# Patient Record
Sex: MALE | Race: WHITE | URBAN - METROPOLITAN AREA
[De-identification: names, ages, dates, MRNs, and addresses within clinical notes are randomized per-mention and may not be internally consistent; named-entity substitution may affect disease eponyms.]

---

## 2018-06-28 ENCOUNTER — OUTPATIENT (OUTPATIENT)
Dept: OUTPATIENT SERVICES | Facility: HOSPITAL | Age: 68
LOS: 1 days | Discharge: HOME | End: 2018-06-28

## 2018-06-28 DIAGNOSIS — M79.609 PAIN IN UNSPECIFIED LIMB: ICD-10-CM

## 2018-06-28 DIAGNOSIS — M79.672 PAIN IN LEFT FOOT: ICD-10-CM

## 2019-02-22 ENCOUNTER — INPATIENT (INPATIENT)
Facility: HOSPITAL | Age: 69
LOS: 2 days | Discharge: HOME | End: 2019-02-25
Attending: SURGERY | Admitting: SURGERY
Payer: MEDICARE

## 2019-02-22 VITALS
OXYGEN SATURATION: 96 % | HEIGHT: 67 IN | SYSTOLIC BLOOD PRESSURE: 171 MMHG | DIASTOLIC BLOOD PRESSURE: 102 MMHG | HEART RATE: 99 BPM | TEMPERATURE: 98 F | WEIGHT: 184.97 LBS | RESPIRATION RATE: 19 BRPM

## 2019-02-22 DIAGNOSIS — Z96.659 PRESENCE OF UNSPECIFIED ARTIFICIAL KNEE JOINT: Chronic | ICD-10-CM

## 2019-02-22 DIAGNOSIS — S69.92XA UNSPECIFIED INJURY OF LEFT WRIST, HAND AND FINGER(S), INITIAL ENCOUNTER: Chronic | ICD-10-CM

## 2019-02-22 LAB
ALBUMIN SERPL ELPH-MCNC: 4.6 G/DL — SIGNIFICANT CHANGE UP (ref 3.5–5.2)
ALP SERPL-CCNC: 77 U/L — SIGNIFICANT CHANGE UP (ref 30–115)
ALT FLD-CCNC: 52 U/L — HIGH (ref 0–41)
ANION GAP SERPL CALC-SCNC: 12 MMOL/L — SIGNIFICANT CHANGE UP (ref 7–14)
APTT BLD: 41.4 SEC — HIGH (ref 27–39.2)
AST SERPL-CCNC: 34 U/L — SIGNIFICANT CHANGE UP (ref 0–41)
BASOPHILS # BLD AUTO: 0.11 K/UL — SIGNIFICANT CHANGE UP (ref 0–0.2)
BASOPHILS NFR BLD AUTO: 1.6 % — HIGH (ref 0–1)
BILIRUB SERPL-MCNC: 0.6 MG/DL — SIGNIFICANT CHANGE UP (ref 0.2–1.2)
BUN SERPL-MCNC: 14 MG/DL — SIGNIFICANT CHANGE UP (ref 10–20)
CALCIUM SERPL-MCNC: 9.5 MG/DL — SIGNIFICANT CHANGE UP (ref 8.5–10.1)
CHLORIDE SERPL-SCNC: 99 MMOL/L — SIGNIFICANT CHANGE UP (ref 98–110)
CO2 SERPL-SCNC: 27 MMOL/L — SIGNIFICANT CHANGE UP (ref 17–32)
CREAT SERPL-MCNC: 1.2 MG/DL — SIGNIFICANT CHANGE UP (ref 0.7–1.5)
EOSINOPHIL # BLD AUTO: 0.17 K/UL — SIGNIFICANT CHANGE UP (ref 0–0.7)
EOSINOPHIL NFR BLD AUTO: 2.4 % — SIGNIFICANT CHANGE UP (ref 0–8)
GLUCOSE SERPL-MCNC: 173 MG/DL — HIGH (ref 70–99)
HCT VFR BLD CALC: 52.5 % — HIGH (ref 42–52)
HGB BLD-MCNC: 18 G/DL — SIGNIFICANT CHANGE UP (ref 14–18)
IMM GRANULOCYTES NFR BLD AUTO: 0.3 % — SIGNIFICANT CHANGE UP (ref 0.1–0.3)
INR BLD: 1.16 RATIO — SIGNIFICANT CHANGE UP (ref 0.65–1.3)
LYMPHOCYTES # BLD AUTO: 2.74 K/UL — SIGNIFICANT CHANGE UP (ref 1.2–3.4)
LYMPHOCYTES # BLD AUTO: 38.8 % — SIGNIFICANT CHANGE UP (ref 20.5–51.1)
MCHC RBC-ENTMCNC: 28.8 PG — SIGNIFICANT CHANGE UP (ref 27–31)
MCHC RBC-ENTMCNC: 34.3 G/DL — SIGNIFICANT CHANGE UP (ref 32–37)
MCV RBC AUTO: 84 FL — SIGNIFICANT CHANGE UP (ref 80–94)
MONOCYTES # BLD AUTO: 0.75 K/UL — HIGH (ref 0.1–0.6)
MONOCYTES NFR BLD AUTO: 10.6 % — HIGH (ref 1.7–9.3)
NEUTROPHILS # BLD AUTO: 3.28 K/UL — SIGNIFICANT CHANGE UP (ref 1.4–6.5)
NEUTROPHILS NFR BLD AUTO: 46.3 % — SIGNIFICANT CHANGE UP (ref 42.2–75.2)
NRBC # BLD: 0 /100 WBCS — SIGNIFICANT CHANGE UP (ref 0–0)
PLATELET # BLD AUTO: 204 K/UL — SIGNIFICANT CHANGE UP (ref 130–400)
POTASSIUM SERPL-MCNC: 4.3 MMOL/L — SIGNIFICANT CHANGE UP (ref 3.5–5)
POTASSIUM SERPL-SCNC: 4.3 MMOL/L — SIGNIFICANT CHANGE UP (ref 3.5–5)
PROT SERPL-MCNC: 7 G/DL — SIGNIFICANT CHANGE UP (ref 6–8)
PROTHROM AB SERPL-ACNC: 13.3 SEC — HIGH (ref 9.95–12.87)
RBC # BLD: 6.25 M/UL — HIGH (ref 4.7–6.1)
RBC # FLD: 14 % — SIGNIFICANT CHANGE UP (ref 11.5–14.5)
SODIUM SERPL-SCNC: 138 MMOL/L — SIGNIFICANT CHANGE UP (ref 135–146)
WBC # BLD: 7.07 K/UL — SIGNIFICANT CHANGE UP (ref 4.8–10.8)
WBC # FLD AUTO: 7.07 K/UL — SIGNIFICANT CHANGE UP (ref 4.8–10.8)

## 2019-02-22 PROCEDURE — 99223 1ST HOSP IP/OBS HIGH 75: CPT | Mod: 57

## 2019-02-22 RX ORDER — CEFAZOLIN SODIUM 1 G
1000 VIAL (EA) INJECTION EVERY 8 HOURS
Qty: 0 | Refills: 0 | Status: DISCONTINUED | OUTPATIENT
Start: 2019-02-23 | End: 2019-02-23

## 2019-02-22 RX ORDER — HYDROMORPHONE HYDROCHLORIDE 2 MG/ML
1 INJECTION INTRAMUSCULAR; INTRAVENOUS; SUBCUTANEOUS ONCE
Qty: 0 | Refills: 0 | Status: DISCONTINUED | OUTPATIENT
Start: 2019-02-22 | End: 2019-02-22

## 2019-02-22 RX ORDER — SODIUM CHLORIDE 9 MG/ML
1000 INJECTION INTRAMUSCULAR; INTRAVENOUS; SUBCUTANEOUS
Qty: 0 | Refills: 0 | Status: DISCONTINUED | OUTPATIENT
Start: 2019-02-22 | End: 2019-02-23

## 2019-02-22 RX ORDER — CEFAZOLIN SODIUM 1 G
VIAL (EA) INJECTION
Qty: 0 | Refills: 0 | Status: DISCONTINUED | OUTPATIENT
Start: 2019-02-22 | End: 2019-02-23

## 2019-02-22 RX ORDER — MORPHINE SULFATE 50 MG/1
8 CAPSULE, EXTENDED RELEASE ORAL ONCE
Qty: 0 | Refills: 0 | Status: DISCONTINUED | OUTPATIENT
Start: 2019-02-22 | End: 2019-02-22

## 2019-02-22 RX ORDER — VANCOMYCIN HCL 1 G
1000 VIAL (EA) INTRAVENOUS ONCE
Qty: 0 | Refills: 0 | Status: COMPLETED | OUTPATIENT
Start: 2019-02-22 | End: 2019-02-22

## 2019-02-22 RX ORDER — TETANUS TOXOID, REDUCED DIPHTHERIA TOXOID AND ACELLULAR PERTUSSIS VACCINE, ADSORBED 5; 2.5; 8; 8; 2.5 [IU]/.5ML; [IU]/.5ML; UG/.5ML; UG/.5ML; UG/.5ML
0.5 SUSPENSION INTRAMUSCULAR ONCE
Qty: 0 | Refills: 0 | Status: COMPLETED | OUTPATIENT
Start: 2019-02-22 | End: 2019-02-22

## 2019-02-22 RX ORDER — ACETAMINOPHEN 500 MG
650 TABLET ORAL EVERY 6 HOURS
Qty: 0 | Refills: 0 | Status: DISCONTINUED | OUTPATIENT
Start: 2019-02-22 | End: 2019-02-23

## 2019-02-22 RX ORDER — AMPICILLIN SODIUM AND SULBACTAM SODIUM 250; 125 MG/ML; MG/ML
INJECTION, POWDER, FOR SUSPENSION INTRAMUSCULAR; INTRAVENOUS
Qty: 0 | Refills: 0 | Status: DISCONTINUED | OUTPATIENT
Start: 2019-02-22 | End: 2019-02-22

## 2019-02-22 RX ORDER — CEFAZOLIN SODIUM 1 G
1000 VIAL (EA) INJECTION ONCE
Qty: 0 | Refills: 0 | Status: COMPLETED | OUTPATIENT
Start: 2019-02-22 | End: 2019-02-22

## 2019-02-22 RX ORDER — HYDROMORPHONE HYDROCHLORIDE 2 MG/ML
1 INJECTION INTRAMUSCULAR; INTRAVENOUS; SUBCUTANEOUS EVERY 4 HOURS
Qty: 0 | Refills: 0 | Status: DISCONTINUED | OUTPATIENT
Start: 2019-02-22 | End: 2019-02-23

## 2019-02-22 RX ORDER — CHLORHEXIDINE GLUCONATE 213 G/1000ML
1 SOLUTION TOPICAL
Qty: 0 | Refills: 0 | Status: DISCONTINUED | OUTPATIENT
Start: 2019-02-22 | End: 2019-02-23

## 2019-02-22 RX ORDER — HEPARIN SODIUM 5000 [USP'U]/ML
5000 INJECTION INTRAVENOUS; SUBCUTANEOUS EVERY 8 HOURS
Qty: 0 | Refills: 0 | Status: DISCONTINUED | OUTPATIENT
Start: 2019-02-22 | End: 2019-02-23

## 2019-02-22 RX ORDER — MORPHINE SULFATE 50 MG/1
4 CAPSULE, EXTENDED RELEASE ORAL ONCE
Qty: 0 | Refills: 0 | Status: DISCONTINUED | OUTPATIENT
Start: 2019-02-22 | End: 2019-02-22

## 2019-02-22 RX ORDER — SODIUM CHLORIDE 9 MG/ML
1000 INJECTION, SOLUTION INTRAVENOUS ONCE
Qty: 0 | Refills: 0 | Status: COMPLETED | OUTPATIENT
Start: 2019-02-22 | End: 2019-02-22

## 2019-02-22 RX ORDER — PANTOPRAZOLE SODIUM 20 MG/1
40 TABLET, DELAYED RELEASE ORAL DAILY
Qty: 0 | Refills: 0 | Status: DISCONTINUED | OUTPATIENT
Start: 2019-02-22 | End: 2019-02-23

## 2019-02-22 RX ADMIN — Medication 100 MILLIGRAM(S): at 10:46

## 2019-02-22 RX ADMIN — MORPHINE SULFATE 8 MILLIGRAM(S): 50 CAPSULE, EXTENDED RELEASE ORAL at 10:46

## 2019-02-22 RX ADMIN — HYDROMORPHONE HYDROCHLORIDE 1 MILLIGRAM(S): 2 INJECTION INTRAMUSCULAR; INTRAVENOUS; SUBCUTANEOUS at 22:04

## 2019-02-22 RX ADMIN — MORPHINE SULFATE 4 MILLIGRAM(S): 50 CAPSULE, EXTENDED RELEASE ORAL at 15:32

## 2019-02-22 RX ADMIN — Medication 250 MILLIGRAM(S): at 22:54

## 2019-02-22 RX ADMIN — Medication 100 MILLIGRAM(S): at 22:54

## 2019-02-22 RX ADMIN — HYDROMORPHONE HYDROCHLORIDE 1 MILLIGRAM(S): 2 INJECTION INTRAMUSCULAR; INTRAVENOUS; SUBCUTANEOUS at 22:20

## 2019-02-22 RX ADMIN — HYDROMORPHONE HYDROCHLORIDE 1 MILLIGRAM(S): 2 INJECTION INTRAMUSCULAR; INTRAVENOUS; SUBCUTANEOUS at 12:51

## 2019-02-22 RX ADMIN — SODIUM CHLORIDE 1000 MILLILITER(S): 9 INJECTION, SOLUTION INTRAVENOUS at 10:46

## 2019-02-22 RX ADMIN — TETANUS TOXOID, REDUCED DIPHTHERIA TOXOID AND ACELLULAR PERTUSSIS VACCINE, ADSORBED 0.5 MILLILITER(S): 5; 2.5; 8; 8; 2.5 SUSPENSION INTRAMUSCULAR at 10:45

## 2019-02-22 RX ADMIN — HEPARIN SODIUM 5000 UNIT(S): 5000 INJECTION INTRAVENOUS; SUBCUTANEOUS at 22:05
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
## 2019-02-22 NOTE — ED PROVIDER NOTE - SHIFT CHANGE DETAILS
Accepted transfer from [facility] for hand surgery eval.  Arrived in stable condition.  Dressing replaced.  Surgery called.  IV analgesics given.  Will require admission for operative repair in the AM.
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
## 2019-02-22 NOTE — H&P ADULT - NSHPPHYSICALEXAM_GEN_ALL_CORE
PHYSICAL EXAM:  General: NAD, AAOx3, calm and cooperative  Cardiac: RRR S1, S2, no Murmurs, rubs or gallops  Respiratory: CTAB, normal respiratory effort  MSK: Limited ROM of phalanges of left hand secondary to lacerations and pain. unable to extend DIP of left hand 3rd & 4th digit. extension intact in left 1st, 2nd, 5th.   Skin:  2 cm lacerations to dorsal region of PIP of 1st, 3th, 4th and 5th digit. + partial amputation to 3rd left digit due to deep laceration, approximately 2.5 cm in length. No active bleeding. No FB. Good cap refill < 2 sec 1st, 2nd, 4th, 5th, decreased cap refill in 3rd.   Incision/wound: dressings in place, clean, dry and intact
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
## 2019-02-22 NOTE — H&P ADULT - NSHPLABSRESULTS_GEN_ALL_CORE
LAB/STUDIES:                        18.0   7.07  )-----------( 204      ( 22 Feb 2019 10:53 )             52.5     02-22    138  |  99  |  14  ----------------------------<  173<H>  4.3   |  27  |  1.2    Ca    9.5      22 Feb 2019 10:53    TPro  7.0  /  Alb  4.6  /  TBili  0.6  /  DBili  x   /  AST  34  /  ALT  52<H>  /  AlkPhos  77  02-22    PT/INR - ( 22 Feb 2019 10:53 )   PT: 13.30 sec;   INR: 1.16 ratio         PTT - ( 22 Feb 2019 10:53 )  PTT:41.4 sec  LIVER FUNCTIONS - ( 22 Feb 2019 10:53 )  Alb: 4.6 g/dL / Pro: 7.0 g/dL / ALK PHOS: 77 U/L / ALT: 52 U/L / AST: 34 U/L / GGT: x    IMAGING:  Xray Hand 3 Views, Left (02.22.19 @ 11:03) >    impression: Soft tissue swelling and injury. Comminuted displaced   angulated fifth proximal phalangeal intra-articular fracture extending   into the metacarpal phalangeal joint.  Third distal phalangeal comminuted   fracture, persistent flexion.. Fourth proximal interphalangeal joint   subluxation.. Second distal phalangeal 6 mm x 0.3 mm soft tissue metallic   foreign body and second middle phalangeal 5 mm x 0.3 mm soft tissue   metallic foreign body.. Wrist, first metacarpal phalangeal joint and   radioulnar joint degenerative changes. Navicular distal pole cyst.
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
## 2019-02-22 NOTE — ED PROVIDER NOTE - NS ED ROS FT
Constitutional: (-) fever (-) malaise (-) diaphoresis (-) chills   Eyes: (-) visual changes (-) eye pain  ENMT: (-) nasal or chest congestion (-) runny nose (-) sore throat (-) neck pain (-) neck stiffness  Cardiac: (-) chest pain  (-) palpitations (-) syncope   Respiratory: (-) cough (-) SOB  GI: (-) nausea (-) vomiting (-) diarrhea   MS: (+) left hand pain (-) back pain  Neuro: (-) headache (-) dizziness (-) numbness/tingling to extremities B/L (-) weakness (-) LOC (-) head injury   Skin: (+) multiple lacerations to fingers of left hand (-) rash  Except as documented in the HPI, all other systems are negative.

## 2019-02-22 NOTE — ED PROVIDER NOTE - PHYSICAL EXAMINATION
GENERAL: Well-nourished, Well-developed. NAD.  HEAD: No visible or palpable bumps or hematomas. No ecchymosis behind ears B/L.  Eyes: PERRLA, EOMI.   Neck: Supple. No LAD. No cervical midline TTP. FROM  CVS:  Normal S1,S2. No murmurs appreciated on auscultation   RESP: No use of accessory muscles. Chest rise symmetrical with good expansion. Lungs clear to auscultation B/L. No wheezing, rales, or rhonchi auscultated.  MSK: Limited ROM of phalanges of left hand secondary to lacerations and pain. Good strength and sensation to all digits of left hand. + bony deformity to 3rd digit of left hand. FROM of right upper extremity. FROM of wrists B/L  Skin: + approximately three, 2 cm lacerations to dorsal region of PIP of 1st, 4th, and 5th digit. + deep laceration , approximately 2.5 cm, to 3rd digit that is flexed at DIP due to laceration. Good cap refill < 2 sec B/L.  EXT: Radial pulses present B/L.   Neuro: AA&O x 3. CNs II-XII grossly intact. Speaking in full sentences. No slurring of speech. No facial droop. No tremors. Sensation grossly intact. Strength 5/5 B/L. Gait within normal limits.   Psych:  Appropriate mood and affect. Cooperative. Calm GENERAL: Well-nourished, Well-developed. NAD.  HEAD: No visible or palpable bumps or hematomas. No ecchymosis behind ears B/L.  Eyes: PERRLA, EOMI.   Neck: Supple. No LAD. No cervical midline TTP. FROM  CVS:  Normal S1,S2. No murmurs appreciated on auscultation   RESP: No use of accessory muscles. Chest rise symmetrical with good expansion. Lungs clear to auscultation B/L. No wheezing, rales, or rhonchi auscultated.  MSK: Limited ROM of phalanges of left hand secondary to lacerations and pain. Good strength and sensation to all digits of left hand including 3rd digit. + bony deformity to 3rd digit of left hand. FROM of right upper extremity. FROM of wrists B/L  Skin: + approximately three, 2 cm lacerations to dorsal region of PIP of 1st, 4th, and 5th digit. + deep laceration , approximately 2.5 cm, to 3rd digit that is flexed at DIP due to laceration. Not actively bleeding. No FB. Good cap refill < 2 sec B/L.  EXT: Radial pulses present B/L.   Neuro: AA&O x 3. CNs II-XII grossly intact. Speaking in full sentences. No slurring of speech. No facial droop. No tremors. Sensation grossly intact. Strength 5/5 B/L. Gait within normal limits.   Psych:  Appropriate mood and affect. Cooperative. Calm GENERAL: Well-nourished, Well-developed. NAD.  HEAD: No visible or palpable bumps or hematomas. No ecchymosis behind ears B/L.  Eyes: PERRLA, EOMI.   Neck: Supple. No LAD. No cervical midline TTP. FROM  CVS:  Normal S1,S2. No murmurs appreciated on auscultation   RESP: No use of accessory muscles. Chest rise symmetrical with good expansion. Lungs clear to auscultation B/L. No wheezing, rales, or rhonchi auscultated.  MSK: Limited ROM of phalanges of left hand secondary to lacerations and pain. Good strength and sensation to all digits of left hand including 3rd digit. + bony deformity to 3rd digit of left hand. FROM of right upper extremity. FROM of wrists B/L  Skin: + approximately three, 2 cm lacerations to dorsal region of PIP of 1st, 4th, and 5th digit. + partial amputation to 3rd left digit due to deep laceration, approximately 2.5 cm in length, to 3rd digit flexed at DIP due to laceration. Not actively bleeding. No FB. Good cap refill < 2 sec B/L.  EXT: Radial pulses present B/L.   Neuro: AA&O x 3. CNs II-XII grossly intact. Speaking in full sentences. No slurring of speech. No facial droop. No tremors. Sensation grossly intact. Strength 5/5 B/L. Gait within normal limits.   Psych:  Appropriate mood and affect. Cooperative. Calm

## 2019-02-22 NOTE — ED ADULT NURSE REASSESSMENT NOTE - NS ED NURSE REASSESS COMMENT FT1
Pt transferred from [facility]. A&O x4. Appears comfortable. VSS. Will continue to monitor and assess.

## 2019-02-22 NOTE — ED PROVIDER NOTE - PROGRESS NOTE DETAILS
Spoke to Dr. Samson at [place]. Aware of patient's transfer. Spoke to General surgeon on call. Aware patient is being transferred to [place] and will see patient when he arrives in the ED. PT received to [facility]. Surgery called who agree to see the patient.
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
## 2019-02-23 LAB
ANION GAP SERPL CALC-SCNC: 17 MMOL/L — HIGH (ref 7–14)
APTT BLD: 34.9 SEC — SIGNIFICANT CHANGE UP (ref 27–39.2)
BASOPHILS # BLD AUTO: 0.06 K/UL — SIGNIFICANT CHANGE UP (ref 0–0.2)
BASOPHILS NFR BLD AUTO: 0.6 % — SIGNIFICANT CHANGE UP (ref 0–1)
BUN SERPL-MCNC: 15 MG/DL — SIGNIFICANT CHANGE UP (ref 10–20)
CALCIUM SERPL-MCNC: 9.2 MG/DL — SIGNIFICANT CHANGE UP (ref 8.5–10.1)
CHLORIDE SERPL-SCNC: 97 MMOL/L — LOW (ref 98–110)
CO2 SERPL-SCNC: 24 MMOL/L — SIGNIFICANT CHANGE UP (ref 17–32)
CREAT SERPL-MCNC: 1 MG/DL — SIGNIFICANT CHANGE UP (ref 0.7–1.5)
EOSINOPHIL # BLD AUTO: 0.02 K/UL — SIGNIFICANT CHANGE UP (ref 0–0.7)
EOSINOPHIL NFR BLD AUTO: 0.2 % — SIGNIFICANT CHANGE UP (ref 0–8)
GLUCOSE BLDC GLUCOMTR-MCNC: 132 MG/DL — HIGH (ref 70–99)
GLUCOSE SERPL-MCNC: 169 MG/DL — HIGH (ref 70–99)
HCT VFR BLD CALC: 46.5 % — SIGNIFICANT CHANGE UP (ref 42–52)
HGB BLD-MCNC: 16.1 G/DL — SIGNIFICANT CHANGE UP (ref 14–18)
IMM GRANULOCYTES NFR BLD AUTO: 0.2 % — SIGNIFICANT CHANGE UP (ref 0.1–0.3)
INR BLD: 1.27 RATIO — SIGNIFICANT CHANGE UP (ref 0.65–1.3)
LYMPHOCYTES # BLD AUTO: 1.96 K/UL — SIGNIFICANT CHANGE UP (ref 1.2–3.4)
LYMPHOCYTES # BLD AUTO: 18.2 % — LOW (ref 20.5–51.1)
MAGNESIUM SERPL-MCNC: 1.9 MG/DL — SIGNIFICANT CHANGE UP (ref 1.8–2.4)
MCHC RBC-ENTMCNC: 28.6 PG — SIGNIFICANT CHANGE UP (ref 27–31)
MCHC RBC-ENTMCNC: 34.6 G/DL — SIGNIFICANT CHANGE UP (ref 32–37)
MCV RBC AUTO: 82.6 FL — SIGNIFICANT CHANGE UP (ref 80–94)
MONOCYTES # BLD AUTO: 0.5 K/UL — SIGNIFICANT CHANGE UP (ref 0.1–0.6)
MONOCYTES NFR BLD AUTO: 4.6 % — SIGNIFICANT CHANGE UP (ref 1.7–9.3)
NEUTROPHILS # BLD AUTO: 8.21 K/UL — HIGH (ref 1.4–6.5)
NEUTROPHILS NFR BLD AUTO: 76.2 % — HIGH (ref 42.2–75.2)
NRBC # BLD: 0 /100 WBCS — SIGNIFICANT CHANGE UP (ref 0–0)
PHOSPHATE SERPL-MCNC: 3.5 MG/DL — SIGNIFICANT CHANGE UP (ref 2.1–4.9)
PLATELET # BLD AUTO: 168 K/UL — SIGNIFICANT CHANGE UP (ref 130–400)
POTASSIUM SERPL-MCNC: 4.1 MMOL/L — SIGNIFICANT CHANGE UP (ref 3.5–5)
POTASSIUM SERPL-SCNC: 4.1 MMOL/L — SIGNIFICANT CHANGE UP (ref 3.5–5)
PROTHROM AB SERPL-ACNC: 14.6 SEC — HIGH (ref 9.95–12.87)
RBC # BLD: 5.63 M/UL — SIGNIFICANT CHANGE UP (ref 4.7–6.1)
RBC # FLD: 13.9 % — SIGNIFICANT CHANGE UP (ref 11.5–14.5)
SODIUM SERPL-SCNC: 138 MMOL/L — SIGNIFICANT CHANGE UP (ref 135–146)
TYPE + AB SCN PNL BLD: SIGNIFICANT CHANGE UP
WBC # BLD: 10.77 K/UL — SIGNIFICANT CHANGE UP (ref 4.8–10.8)
WBC # FLD AUTO: 10.77 K/UL — SIGNIFICANT CHANGE UP (ref 4.8–10.8)

## 2019-02-23 PROCEDURE — 26725 TREAT FINGER FRACTURE EACH: CPT | Mod: 59,F4

## 2019-02-23 PROCEDURE — 26426 REPAIR FINGER/HAND TENDON: CPT | Mod: 59

## 2019-02-23 PROCEDURE — 26418 REPAIR FINGER TENDON: CPT | Mod: 59

## 2019-02-23 PROCEDURE — 26735 TREAT FINGER FRACTURE EACH: CPT | Mod: 59,F2

## 2019-02-23 PROCEDURE — 26746 TREAT FINGER FRACTURE EACH: CPT | Mod: F1

## 2019-02-23 RX ORDER — GABAPENTIN 400 MG/1
300 CAPSULE ORAL THREE TIMES A DAY
Qty: 0 | Refills: 0 | Status: DISCONTINUED | OUTPATIENT
Start: 2019-02-23 | End: 2019-02-25

## 2019-02-23 RX ORDER — OXYCODONE HYDROCHLORIDE 5 MG/1
5 TABLET ORAL
Qty: 0 | Refills: 0 | Status: DISCONTINUED | OUTPATIENT
Start: 2019-02-23 | End: 2019-02-25

## 2019-02-23 RX ORDER — AMPICILLIN SODIUM AND SULBACTAM SODIUM 250; 125 MG/ML; MG/ML
INJECTION, POWDER, FOR SUSPENSION INTRAMUSCULAR; INTRAVENOUS
Qty: 0 | Refills: 0 | Status: DISCONTINUED | OUTPATIENT
Start: 2019-02-23 | End: 2019-02-23

## 2019-02-23 RX ORDER — PANTOPRAZOLE SODIUM 20 MG/1
40 TABLET, DELAYED RELEASE ORAL DAILY
Qty: 0 | Refills: 0 | Status: DISCONTINUED | OUTPATIENT
Start: 2019-02-23 | End: 2019-02-23

## 2019-02-23 RX ORDER — SODIUM CHLORIDE 9 MG/ML
1000 INJECTION, SOLUTION INTRAVENOUS
Qty: 0 | Refills: 0 | Status: DISCONTINUED | OUTPATIENT
Start: 2019-02-23 | End: 2019-02-24

## 2019-02-23 RX ORDER — PANTOPRAZOLE SODIUM 20 MG/1
40 TABLET, DELAYED RELEASE ORAL
Qty: 0 | Refills: 0 | Status: DISCONTINUED | OUTPATIENT
Start: 2019-02-23 | End: 2019-02-25

## 2019-02-23 RX ORDER — VANCOMYCIN HCL 1 G
1000 VIAL (EA) INTRAVENOUS ONCE
Qty: 0 | Refills: 0 | Status: COMPLETED | OUTPATIENT
Start: 2019-02-23 | End: 2019-02-23

## 2019-02-23 RX ORDER — HYDROMORPHONE HYDROCHLORIDE 2 MG/ML
0.5 INJECTION INTRAMUSCULAR; INTRAVENOUS; SUBCUTANEOUS
Qty: 0 | Refills: 0 | Status: DISCONTINUED | OUTPATIENT
Start: 2019-02-23 | End: 2019-02-23

## 2019-02-23 RX ORDER — DEXAMETHASONE 0.5 MG/5ML
8 ELIXIR ORAL ONCE
Qty: 0 | Refills: 0 | Status: DISCONTINUED | OUTPATIENT
Start: 2019-02-23 | End: 2019-02-24

## 2019-02-23 RX ORDER — HEPARIN SODIUM 5000 [USP'U]/ML
5000 INJECTION INTRAVENOUS; SUBCUTANEOUS EVERY 8 HOURS
Qty: 0 | Refills: 0 | Status: DISCONTINUED | OUTPATIENT
Start: 2019-02-23 | End: 2019-02-25

## 2019-02-23 RX ORDER — SODIUM CHLORIDE 9 MG/ML
1000 INJECTION INTRAMUSCULAR; INTRAVENOUS; SUBCUTANEOUS
Qty: 0 | Refills: 0 | Status: DISCONTINUED | OUTPATIENT
Start: 2019-02-23 | End: 2019-02-25

## 2019-02-23 RX ORDER — MEPERIDINE HYDROCHLORIDE 50 MG/ML
12.5 INJECTION INTRAMUSCULAR; INTRAVENOUS; SUBCUTANEOUS
Qty: 0 | Refills: 0 | Status: DISCONTINUED | OUTPATIENT
Start: 2019-02-23 | End: 2019-02-24

## 2019-02-23 RX ORDER — CHLORHEXIDINE GLUCONATE 213 G/1000ML
1 SOLUTION TOPICAL
Qty: 0 | Refills: 0 | Status: DISCONTINUED | OUTPATIENT
Start: 2019-02-23 | End: 2019-02-25

## 2019-02-23 RX ORDER — PIPERACILLIN AND TAZOBACTAM 4; .5 G/20ML; G/20ML
3.38 INJECTION, POWDER, LYOPHILIZED, FOR SOLUTION INTRAVENOUS EVERY 8 HOURS
Qty: 0 | Refills: 0 | Status: DISCONTINUED | OUTPATIENT
Start: 2019-02-23 | End: 2019-02-25

## 2019-02-23 RX ORDER — HYDROMORPHONE HYDROCHLORIDE 2 MG/ML
1 INJECTION INTRAMUSCULAR; INTRAVENOUS; SUBCUTANEOUS
Qty: 0 | Refills: 0 | Status: DISCONTINUED | OUTPATIENT
Start: 2019-02-23 | End: 2019-02-24

## 2019-02-23 RX ORDER — ACETAMINOPHEN 500 MG
650 TABLET ORAL EVERY 6 HOURS
Qty: 0 | Refills: 0 | Status: DISCONTINUED | OUTPATIENT
Start: 2019-02-23 | End: 2019-02-25

## 2019-02-23 RX ORDER — ONDANSETRON 8 MG/1
4 TABLET, FILM COATED ORAL ONCE
Qty: 0 | Refills: 0 | Status: DISCONTINUED | OUTPATIENT
Start: 2019-02-23 | End: 2019-02-24

## 2019-02-23 RX ORDER — HYDROMORPHONE HYDROCHLORIDE 2 MG/ML
1 INJECTION INTRAMUSCULAR; INTRAVENOUS; SUBCUTANEOUS EVERY 4 HOURS
Qty: 0 | Refills: 0 | Status: DISCONTINUED | OUTPATIENT
Start: 2019-02-23 | End: 2019-02-23

## 2019-02-23 RX ADMIN — Medication 100 MILLIGRAM(S): at 05:14

## 2019-02-23 RX ADMIN — PANTOPRAZOLE SODIUM 40 MILLIGRAM(S): 20 TABLET, DELAYED RELEASE ORAL at 12:55

## 2019-02-23 RX ADMIN — SODIUM CHLORIDE 100 MILLILITER(S): 9 INJECTION INTRAMUSCULAR; INTRAVENOUS; SUBCUTANEOUS at 21:45

## 2019-02-23 RX ADMIN — HYDROMORPHONE HYDROCHLORIDE 1 MILLIGRAM(S): 2 INJECTION INTRAMUSCULAR; INTRAVENOUS; SUBCUTANEOUS at 21:16

## 2019-02-23 RX ADMIN — PIPERACILLIN AND TAZOBACTAM 25 GRAM(S): 4; .5 INJECTION, POWDER, LYOPHILIZED, FOR SOLUTION INTRAVENOUS at 22:56

## 2019-02-23 RX ADMIN — SODIUM CHLORIDE 100 MILLILITER(S): 9 INJECTION INTRAMUSCULAR; INTRAVENOUS; SUBCUTANEOUS at 00:00

## 2019-02-23 RX ADMIN — Medication 250 MILLIGRAM(S): at 21:45

## 2019-02-23 RX ADMIN — HYDROMORPHONE HYDROCHLORIDE 0.5 MILLIGRAM(S): 2 INJECTION INTRAMUSCULAR; INTRAVENOUS; SUBCUTANEOUS at 21:05

## 2019-02-23 RX ADMIN — HYDROMORPHONE HYDROCHLORIDE 0.5 MILLIGRAM(S): 2 INJECTION INTRAMUSCULAR; INTRAVENOUS; SUBCUTANEOUS at 21:58

## 2019-02-23 RX ADMIN — SODIUM CHLORIDE 100 MILLILITER(S): 9 INJECTION INTRAMUSCULAR; INTRAVENOUS; SUBCUTANEOUS at 10:56

## 2019-02-23 RX ADMIN — SODIUM CHLORIDE 125 MILLILITER(S): 9 INJECTION, SOLUTION INTRAVENOUS at 20:35

## 2019-02-23 RX ADMIN — HEPARIN SODIUM 5000 UNIT(S): 5000 INJECTION INTRAVENOUS; SUBCUTANEOUS at 21:55

## 2019-02-23 RX ADMIN — HYDROMORPHONE HYDROCHLORIDE 1 MILLIGRAM(S): 2 INJECTION INTRAMUSCULAR; INTRAVENOUS; SUBCUTANEOUS at 21:30

## 2019-02-23 RX ADMIN — GABAPENTIN 300 MILLIGRAM(S): 400 CAPSULE ORAL at 21:54

## 2019-02-23 RX ADMIN — HEPARIN SODIUM 5000 UNIT(S): 5000 INJECTION INTRAVENOUS; SUBCUTANEOUS at 05:13

## 2019-02-23 RX ADMIN — Medication 650 MILLIGRAM(S): at 05:13

## 2019-02-23 RX ADMIN — HYDROMORPHONE HYDROCHLORIDE 0.5 MILLIGRAM(S): 2 INJECTION INTRAMUSCULAR; INTRAVENOUS; SUBCUTANEOUS at 21:15

## 2019-02-23 RX ADMIN — Medication 650 MILLIGRAM(S): at 05:45
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
## 2019-02-23 NOTE — BRIEF OPERATIVE NOTE - PROCEDURE
<<-----Click on this checkbox to enter Procedure Tendon repair  02/23/2019  exploration, irrigation , debridement open fixation , k-wire fixation of 3rd , 5th digit fx ,  tendermodesis of 3 rd , 4th extensor tendons,repair of 5th extensor tendon  Active  [id number] Open reduction and internal fixation (ORIF) of finger with application of external fixator  02/23/2019  left SF P1 comminuted fracture  Active  [email or web address]  Open reduction and internal fixation (ORIF) of fracture of phalanx of finger of left hand  02/23/2019  k-wire fixation (2) of MF distal head P2 pylon fracture  k-wire fixation (2) of RF distal P1 impaction fracture  Active  [email or web address]  Tendon repair  02/23/2019  exploration, irrigation , debridement open fixation , k-wire fixation of 3rd , 5th digit fx ,  tendermodesis of 3 rd , 4th extensor tendons, repair of5th extensor tendon  Active  Tracee Heller

## 2019-02-23 NOTE — CHART NOTE - NSCHARTNOTEFT_GEN_A_CORE
PACU ANESTHESIA PACU ADMISSION NOTE      Procedure:  Post op diagnosis    ____ Intubated  TV:______       Rate: ______      FiO2: ______    __x__ Patent Airway    ___x_ Full return of protective reflexes    ____ Full recovery from anesthesia / sedation to baseline status    Viitals:  see anesthesia record          Mental Status:  ____ Awake   ___x__ Alert   _____ Drowsy   _____ Sedated    Nausea/Vomiting: ____ Yes, See Post - Op Orders      __x__ No    Pain Scale (0-10): _____    Treatment: ____ None    __x__ See Post - Op/PCA Orders    Post - Operative Fluids:   ____ Oral   _x___ See Post - Op Orders    Plan:         Discharge:   ____Home       __x___Floor         _____Critical Care    _____Other:_________________    Comments:  unbeventful perioperative course; VSS on admission to PACU; endorsed to PACU RN
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
## 2019-02-23 NOTE — PROGRESS NOTE ADULT - SUBJECTIVE AND OBJECTIVE BOX
GENERAL SURGERY PROGRESS NOTE     HARRIET RAI  [address] [place] [address] day :2d  Surgical Attending: Jayla Joyce  Overnight events: No acute issues overnight. Pain controlled with dilaudid, morphine.    T(F): 97.9 (02-22-19 @ 23:00), Max: 97.9 (02-22-19 @ 23:00)  HR: 82 (02-22-19 @ 23:00) (73 - 99)  BP: 119/60 (02-22-19 @ 23:00) (119/60 - 171/102)  ABP: --  ABP(mean): --  RR: 18 (02-22-19 @ 23:00) (16 - 19)  SpO2: 99% (02-22-19 @ 15:13) (96% - 99%)      02-22-19 @ 07:01  -  02-23-19 @ 07:00  --------------------------------------------------------  IN:    IV PiggyBack: 350 mL    sodium chloride 0.9%.: 800 mL  Total IN: 1150 mL    OUT:  Total OUT: 0 mL    Total NET: 1150 mL        DIET/FLUIDS: sodium chloride 0.9%. 1000 milliLiter(s) IV Continuous <Continuous>         GI proph:  pantoprazole  Injectable 40 milliGRAM(s) IV Push daily    AC/ proph: heparin  Injectable 5000 Unit(s) SubCutaneous every 8 hours    ABx: ceFAZolin   IVPB 1000 milliGRAM(s) IV Intermittent every 8 hours  ceFAZolin   IVPB          PHYSICAL EXAM:  GENERAL: NAD  CHEST/LUNG: Clear to auscultation bilaterally  HEART: Regular rate and rhythm  ABDOMEN: Soft, Nontender, Nondistended;   EXTREMITIES:  L hand with splint in place, sutures in place, ROM limited for 3rd, 4th digit for extension, extension intact for all other fingers, 3rd left digit with partial amputation      LABS  Labs:  CAPILLARY BLOOD GLUCOSE      POCT Blood Glucose.: 132 mg/dL (23 Feb 2019 00:16)  POCT Blood Glucose.: 123 mg/dL (22 Feb 2019 14:42)                          16.1   10.77 )-----------( 168      ( 23 Feb 2019 00:36 )             46.5       Auto Neutrophil %: 76.2 % (02-23-19 @ 00:36)  Auto Immature Granulocyte %: 0.2 % (02-23-19 @ 00:36)  Auto Neutrophil %: 46.3 % (02-22-19 @ 10:53)  Auto Immature Granulocyte %: 0.3 % (02-22-19 @ 10:53)    02-23    138  |  97<L>  |  15  ----------------------------<  169<H>  4.1   |  24  |  1.0      Calcium, Total Serum: 9.2 mg/dL (02-23-19 @ 00:36)      LFTs:             7.0  | 0.6  | 34       ------------------[77      ( 22 Feb 2019 10:53 )  4.6  | x    | 52          Lipase:x      Amylase:x             Coags:     14.60  ----< 1.27    ( 23 Feb 2019 00:36 )     34.9        RADIOLOGY & ADDITIONAL TESTS:  < from: Xray Hand 3 Views, Left (02.22.19 @ 11:03) >    Findings/  impression: Soft tissue swelling and injury. Comminuted displaced   angulated fifth proximal phalangeal intra-articular fracture extending   into the metacarpal phalangeal joint.  Third distal phalangeal comminuted   fracture, persistent flexion.. Fourth proximal interphalangeal joint   subluxation.. Second distal phalangeal 6 mm x 0.3 mm soft tissue metallic   foreign body and second middle phalangeal 5 mm x 0.3 mm soft tissue   metallic foreign body.. Wrist, first metacarpal phalangeal joint and   radioulnar joint degenerative changes. Navicular distal pole cyst.      < end of copied text > PLASTIC SURGERY PROGRESS NOTE     JOHNVICENTESANJIV HARRIET  [address] [place] [address] day :2d  Surgical Attending: Jayla Joyce  Overnight events: No acute issues overnight. Pain controlled with dilaudid, morphine.    T(F): 97.9 (02-22-19 @ 23:00), Max: 97.9 (02-22-19 @ 23:00)  HR: 82 (02-22-19 @ 23:00) (73 - 99)  BP: 119/60 (02-22-19 @ 23:00) (119/60 - 171/102)  ABP: --  ABP(mean): --  RR: 18 (02-22-19 @ 23:00) (16 - 19)  SpO2: 99% (02-22-19 @ 15:13) (96% - 99%)      02-22-19 @ 07:01  -  02-23-19 @ 07:00  --------------------------------------------------------  IN:    IV PiggyBack: 350 mL    sodium chloride 0.9%.: 800 mL  Total IN: 1150 mL    OUT:  Total OUT: 0 mL    Total NET: 1150 mL        DIET/FLUIDS: sodium chloride 0.9%. 1000 milliLiter(s) IV Continuous <Continuous>         GI proph:  pantoprazole  Injectable 40 milliGRAM(s) IV Push daily    AC/ proph: heparin  Injectable 5000 Unit(s) SubCutaneous every 8 hours    ABx: ceFAZolin   IVPB 1000 milliGRAM(s) IV Intermittent every 8 hours  ceFAZolin   IVPB          PHYSICAL EXAM:  GENERAL: NAD  CHEST/LUNG: Clear to auscultation bilaterally  HEART: Regular rate and rhythm  ABDOMEN: Soft, Nontender, Nondistended;   EXTREMITIES:  L hand with splint in place, sutures in place, ROM limited for 3rd, 4th digit for extension, extension intact for all other fingers, 3rd left digit with partial amputation      LABS  Labs:  CAPILLARY BLOOD GLUCOSE      POCT Blood Glucose.: 132 mg/dL (23 Feb 2019 00:16)  POCT Blood Glucose.: 123 mg/dL (22 Feb 2019 14:42)                          16.1   10.77 )-----------( 168      ( 23 Feb 2019 00:36 )             46.5       Auto Neutrophil %: 76.2 % (02-23-19 @ 00:36)  Auto Immature Granulocyte %: 0.2 % (02-23-19 @ 00:36)  Auto Neutrophil %: 46.3 % (02-22-19 @ 10:53)  Auto Immature Granulocyte %: 0.3 % (02-22-19 @ 10:53)    02-23    138  |  97<L>  |  15  ----------------------------<  169<H>  4.1   |  24  |  1.0      Calcium, Total Serum: 9.2 mg/dL (02-23-19 @ 00:36)      LFTs:             7.0  | 0.6  | 34       ------------------[77      ( 22 Feb 2019 10:53 )  4.6  | x    | 52          Lipase:x      Amylase:x             Coags:     14.60  ----< 1.27    ( 23 Feb 2019 00:36 )     34.9        RADIOLOGY & ADDITIONAL TESTS:  < from: Xray Hand 3 Views, Left (02.22.19 @ 11:03) >    Findings/  impression: Soft tissue swelling and injury. Comminuted displaced   angulated fifth proximal phalangeal intra-articular fracture extending   into the metacarpal phalangeal joint.  Third distal phalangeal comminuted   fracture, persistent flexion.. Fourth proximal interphalangeal joint   subluxation.. Second distal phalangeal 6 mm x 0.3 mm soft tissue metallic   foreign body and second middle phalangeal 5 mm x 0.3 mm soft tissue   metallic foreign body.. Wrist, first metacarpal phalangeal joint and   radioulnar joint degenerative changes. Navicular distal pole cyst.      < end of copied text >

## 2019-02-24 RX ADMIN — Medication 650 MILLIGRAM(S): at 12:18

## 2019-02-24 RX ADMIN — GABAPENTIN 300 MILLIGRAM(S): 400 CAPSULE ORAL at 14:38

## 2019-02-24 RX ADMIN — HEPARIN SODIUM 5000 UNIT(S): 5000 INJECTION INTRAVENOUS; SUBCUTANEOUS at 05:47

## 2019-02-24 RX ADMIN — Medication 650 MILLIGRAM(S): at 06:42

## 2019-02-24 RX ADMIN — Medication 650 MILLIGRAM(S): at 15:45

## 2019-02-24 RX ADMIN — PANTOPRAZOLE SODIUM 40 MILLIGRAM(S): 20 TABLET, DELAYED RELEASE ORAL at 07:58

## 2019-02-24 RX ADMIN — Medication 650 MILLIGRAM(S): at 05:47

## 2019-02-24 RX ADMIN — HEPARIN SODIUM 5000 UNIT(S): 5000 INJECTION INTRAVENOUS; SUBCUTANEOUS at 21:19

## 2019-02-24 RX ADMIN — GABAPENTIN 300 MILLIGRAM(S): 400 CAPSULE ORAL at 05:48

## 2019-02-24 RX ADMIN — CHLORHEXIDINE GLUCONATE 1 APPLICATION(S): 213 SOLUTION TOPICAL at 05:52

## 2019-02-24 RX ADMIN — SODIUM CHLORIDE 100 MILLILITER(S): 9 INJECTION INTRAMUSCULAR; INTRAVENOUS; SUBCUTANEOUS at 11:21

## 2019-02-24 RX ADMIN — PIPERACILLIN AND TAZOBACTAM 25 GRAM(S): 4; .5 INJECTION, POWDER, LYOPHILIZED, FOR SOLUTION INTRAVENOUS at 14:37

## 2019-02-24 RX ADMIN — PIPERACILLIN AND TAZOBACTAM 25 GRAM(S): 4; .5 INJECTION, POWDER, LYOPHILIZED, FOR SOLUTION INTRAVENOUS at 05:47

## 2019-02-24 RX ADMIN — Medication 650 MILLIGRAM(S): at 01:49

## 2019-02-24 RX ADMIN — PIPERACILLIN AND TAZOBACTAM 25 GRAM(S): 4; .5 INJECTION, POWDER, LYOPHILIZED, FOR SOLUTION INTRAVENOUS at 21:19

## 2019-02-24 RX ADMIN — Medication 650 MILLIGRAM(S): at 17:33

## 2019-02-24 RX ADMIN — GABAPENTIN 300 MILLIGRAM(S): 400 CAPSULE ORAL at 21:20

## 2019-02-24 RX ADMIN — Medication 650 MILLIGRAM(S): at 02:20

## 2019-02-24 RX ADMIN — HEPARIN SODIUM 5000 UNIT(S): 5000 INJECTION INTRAVENOUS; SUBCUTANEOUS at 14:37
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
## 2019-02-25 ENCOUNTER — TRANSCRIPTION ENCOUNTER (OUTPATIENT)
Age: 69
End: 2019-02-25

## 2019-02-25 ENCOUNTER — INBOUND DOCUMENT (OUTPATIENT)
Age: 69
End: 2019-02-25

## 2019-02-25 VITALS
TEMPERATURE: 96 F | DIASTOLIC BLOOD PRESSURE: 70 MMHG | SYSTOLIC BLOOD PRESSURE: 153 MMHG | HEART RATE: 65 BPM | RESPIRATION RATE: 18 BRPM

## 2019-02-25 PROBLEM — E78.00 PURE HYPERCHOLESTEROLEMIA, UNSPECIFIED: Chronic | Status: ACTIVE | Noted: 2019-02-22

## 2019-02-25 PROBLEM — Z00.00 ENCOUNTER FOR PREVENTIVE HEALTH EXAMINATION: Status: ACTIVE | Noted: 2019-02-25

## 2019-02-25 PROBLEM — K21.9 GASTRO-ESOPHAGEAL REFLUX DISEASE WITHOUT ESOPHAGITIS: Chronic | Status: ACTIVE | Noted: 2019-02-22

## 2019-02-25 RX ORDER — TRAMADOL HYDROCHLORIDE 50 MG/1
1 TABLET ORAL
Qty: 13 | Refills: 0
Start: 2019-02-25

## 2019-02-25 RX ORDER — TRAMADOL HYDROCHLORIDE 50 MG/1
1 TABLET ORAL
Qty: 15 | Refills: 0 | OUTPATIENT
Start: 2019-02-25

## 2019-02-25 RX ORDER — GABAPENTIN 400 MG/1
1 CAPSULE ORAL
Qty: 90 | Refills: 0
Start: 2019-02-25 | End: 2019-03-26

## 2019-02-25 RX ORDER — FAMOTIDINE 10 MG/ML
0 INJECTION INTRAVENOUS
Qty: 0 | Refills: 0 | COMMUNITY

## 2019-02-25 RX ORDER — GABAPENTIN 400 MG/1
1 CAPSULE ORAL
Qty: 90 | Refills: 0 | OUTPATIENT
Start: 2019-02-25 | End: 2019-03-26

## 2019-02-25 RX ADMIN — Medication 650 MILLIGRAM(S): at 00:11

## 2019-02-25 RX ADMIN — Medication 650 MILLIGRAM(S): at 12:25

## 2019-02-25 RX ADMIN — GABAPENTIN 300 MILLIGRAM(S): 400 CAPSULE ORAL at 05:22

## 2019-02-25 RX ADMIN — HEPARIN SODIUM 5000 UNIT(S): 5000 INJECTION INTRAVENOUS; SUBCUTANEOUS at 05:23

## 2019-02-25 RX ADMIN — Medication 650 MILLIGRAM(S): at 05:23

## 2019-02-25 RX ADMIN — SODIUM CHLORIDE 100 MILLILITER(S): 9 INJECTION INTRAMUSCULAR; INTRAVENOUS; SUBCUTANEOUS at 05:25

## 2019-02-25 RX ADMIN — PIPERACILLIN AND TAZOBACTAM 25 GRAM(S): 4; .5 INJECTION, POWDER, LYOPHILIZED, FOR SOLUTION INTRAVENOUS at 05:23

## 2019-02-25 RX ADMIN — PANTOPRAZOLE SODIUM 40 MILLIGRAM(S): 20 TABLET, DELAYED RELEASE ORAL at 08:23

## 2019-02-25 NOTE — PROGRESS NOTE ADULT - SUBJECTIVE AND OBJECTIVE BOX
GENERAL SURGERY PROGRESS NOTE     HARRIET RAI  68y  Male  Hospital day :3d  POD:  Procedure: Open reduction and internal fixation (ORIF) of fracture of phalanx of finger of left hand  Open reduction and internal fixation (ORIF) of finger with application of external fixator  Tendon repair    OVERNIGHT EVENTS:  doing well postoperatively, good sensation and motor function to hand, good capillary refill .  Pain controlled.      T(F): 98.3 (02-24-19 @ 23:00), Max: 98.6 (02-24-19 @ 07:38)  HR: 65 (02-24-19 @ 23:00) (65 - 84)  BP: 149/78 (02-24-19 @ 23:00) (128/70 - 150/73)  RR: 18 (02-24-19 @ 23:00) (18 - 20)    DIET/FLUIDS: sodium chloride 0.9%. 1000 milliLiter(s) IV Continuous <Continuous>     GI proph:  pantoprazole    Tablet 40 milliGRAM(s) Oral before breakfast    AC/ proph: heparin  Injectable 5000 Unit(s) SubCutaneous every 8 hours    ABx: piperacillin/tazobactam IVPB. 3.375 Gram(s) IV Intermittent every 8 hours      PHYSICAL EXAM:  GENERAL: NAD, well-appearing  CHEST/LUNG: Clear to auscultation bilaterally  HEART: Regular rate and rhythm  ABDOMEN: Soft, Nontender, Nondistended;   EXTREMITIES:  No clubbing, cyanosis, or edema; sensation and motor function + to hand, good capillary refill at fingertips.        LABS  Labs:  CAPILLARY BLOOD GLUCOSE        RADIOLOGY & ADDITIONAL TESTS:  < from: Xray Chest 1 View- PORTABLE-Urgent (02.22.19 @ 20:37) >  Impression:      No radiographic evidence of acute cardiopulmonary disease.    < end of copied text >      A/P

## 2019-02-25 NOTE — PROGRESS NOTE ADULT - SUBJECTIVE AND OBJECTIVE BOX
HARRIET RAI  68y, Male      OVERNIGHT EVENTS:  afebrile    ROS negative except as per above    VITALS:  T(F): 96.2, Max: 98.3 (02-24-19 @ 23:00)  HR: 65  BP: 153/70  RR: 18Vital Signs Last 24 Hrs  T(C): 35.7 (25 Feb 2019 07:16), Max: 36.8 (24 Feb 2019 23:00)  T(F): 96.2 (25 Feb 2019 07:16), Max: 98.3 (24 Feb 2019 23:00)  HR: 65 (25 Feb 2019 07:16) (65 - 81)  BP: 153/70 (25 Feb 2019 07:16) (128/70 - 153/70)  BP(mean): 84 (25 Feb 2019 07:16) (84 - 84)  RR: 18 (25 Feb 2019 07:16) (18 - 19)  SpO2: --    PHYSICAL EXAM:  GENERAL: Not in acute distress  CHEST/LUNG: Air entry bilaterally  HEART: s1 and s2 present  ABDOMEN: Nontender and Nondistended  EXTREMITIES:  Left hand bandage in placed  CNS: Awake, Alert and oriented      TESTS & MEASUREMENTS:                  RADIOLOGY & ADDITIONAL TESTS:    ANTIBIOTICS:    ceFAZolin   IVPB   100 mL/Hr IV Intermittent (02-22-19 @ 22:54)    ceFAZolin   IVPB   100 mL/Hr IV Intermittent (02-23-19 @ 05:14)    ceFAZolin   IVPB   100 mL/Hr IV Intermittent (02-22-19 @ 10:46)    piperacillin/tazobactam IVPB.   25 mL/Hr IV Intermittent (02-25-19 @ 05:23)   25 mL/Hr IV Intermittent (02-24-19 @ 21:19)   25 mL/Hr IV Intermittent (02-24-19 @ 14:37)   25 mL/Hr IV Intermittent (02-24-19 @ 05:47)   25 mL/Hr IV Intermittent (02-23-19 @ 22:56)    vancomycin  IVPB   250 mL/Hr IV Intermittent (02-22-19 @ 22:54)    vancomycin  IVPB   250 mL/Hr IV Intermittent (02-23-19 @ 21:45)        piperacillin/tazobactam IVPB. 3.375 Gram(s) IV Intermittent every 8 hours
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
## 2019-02-25 NOTE — PROGRESS NOTE ADULT - ASSESSMENT
68 year old male s/p open reduction and internal fixation (ORIF) of fracture of phalanx of finger of left hand  Open reduction and internal fixation (ORIF) of finger with application of external fixator, tendon repair.  -Diet   -ID following  -POSE block with continuous elevation  -DVT ppx  -GI ppx  -Ambulation  -pain control 68 year old male s/p I&D, ORIF with k-wire 3rd P2 and 4th P1 fracture and with ex-fix 5th P1 fracture, tenodermodesis of extensor tendons.    -Zosyn  -Diet   -ID following; no recs for osteo ppx.  -POSE block with continuous elevation  -DVT ppx  -GI ppx  -Ambulation  -pain control

## 2019-02-25 NOTE — DISCHARGE NOTE ADULT - MEDICATION SUMMARY - MEDICATIONS TO TAKE
I will START or STAY ON the medications listed below when I get home from the hospital:    Aspir 81 oral delayed release tablet  -- Indication: For Anticoagulation    traMADol 50 mg oral tablet  -- 1 tab(s) by mouth every 6 hours, As Needed -for moderate pain - for severe pain MDD:4  -- Caution federal law prohibits the transfer of this drug to any person other  than the person for whom it was prescribed.  May cause drowsiness.  Alcohol may intensify this effect.  Use care when operating dangerous machinery.  Obtain medical advice before taking any non-prescription drugs as some may affect the action of this medication.    -- Indication: For Pain control    gabapentin 300 mg oral capsule  -- 1 cap(s) by mouth 3 times a day MDD:3  -- Indication: For Pain control    Augmentin 875 mg-125 mg oral tablet  -- 1 milligram(s) by mouth 2 times a day   -- Finish all this medication unless otherwise directed by prescriber.  Take with food or milk.    -- Indication: For Hand laceration I will START or STAY ON the medications listed below when I get home from the hospital:    Aspir 81 oral delayed release tablet  -- Indication: For Anticoagulation    traMADol 50 mg oral tablet  -- 1 tab(s) by mouth every 6 hours, As Needed -for moderate pain - for severe pain MDD:4  -- Caution federal law prohibits the transfer of this drug to any person other  than the person for whom it was prescribed.  May cause drowsiness.  Alcohol may intensify this effect.  Use care when operating dangerous machinery.  Obtain medical advice before taking any non-prescription drugs as some may affect the action of this medication.    -- Indication: For Pain control    gabapentin 300 mg oral capsule  -- 1 cap(s) by mouth 3 times a day MDD:3  -- Indication: For Pain control    Augmentin 875 mg-125 mg oral tablet  -- 1 tab(s) by mouth 2 times a day MDD:2  -- Finish all this medication unless otherwise directed by prescriber.  Take with food or milk.    -- Indication: For HAND LACERATION

## 2019-02-25 NOTE — DISCHARGE NOTE ADULT - ADDITIONAL INSTRUCTIONS
You are being discharged from [facility]. Please follow up in clinic with Dr. Joyce in 1 week at her clinic on [address] [address]. Please keep your left hand elevated while at rest. You may take showers, but keep your left hand dry and your splint clean and dry at all times. A prescription for gabapentin and tramadol have been sent to your pharmacy for pain control. A prescription for augmentin has been sent to your pharmacy. Please take augmentin twice a day for the next 7 days. If you have any further questions about your care, please do not hesitate to contact Dr. Joyce's clinic.

## 2019-02-25 NOTE — DISCHARGE NOTE ADULT - PATIENT PORTAL LINK FT
You can access the [facility] Patient Portal, offered by [facility], by registering with the following website: [email or web address]
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
## 2019-02-25 NOTE — DISCHARGE NOTE ADULT - CARE PROVIDER_API CALL
Jayla Joyce)  [facility]  [address] [place] [address], [address]  [place] [address]  Phone: [phone]  Fax: [phone]  Follow Up Time:
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
## 2019-02-28 DIAGNOSIS — K21.9 GASTRO-ESOPHAGEAL REFLUX DISEASE WITHOUT ESOPHAGITIS: ICD-10-CM

## 2019-02-28 DIAGNOSIS — S62.623B DISPLACED FRACTURE OF MIDDLE PHALANX OF LEFT MIDDLE FINGER, INITIAL ENCOUNTER FOR OPEN FRACTURE: ICD-10-CM

## 2019-02-28 DIAGNOSIS — S56.406A: ICD-10-CM

## 2019-02-28 DIAGNOSIS — S62.617B DISPLACED FRACTURE OF PROXIMAL PHALANX OF LEFT LITTLE FINGER, INITIAL ENCOUNTER FOR OPEN FRACTURE: ICD-10-CM

## 2019-02-28 DIAGNOSIS — S56.128A: ICD-10-CM

## 2019-02-28 DIAGNOSIS — S56.404A: ICD-10-CM
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
## 2019-02-28 DIAGNOSIS — S56.408A: ICD-10-CM
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
## 2019-02-28 DIAGNOSIS — S62.625B DISPLACED FRACTURE OF MIDDLE PHALANX OF LEFT RING FINGER, INITIAL ENCOUNTER FOR OPEN FRACTURE: ICD-10-CM
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
## 2019-02-28 DIAGNOSIS — E78.5 HYPERLIPIDEMIA, UNSPECIFIED: ICD-10-CM

## 2019-02-28 DIAGNOSIS — Z96.652 PRESENCE OF LEFT ARTIFICIAL KNEE JOINT: ICD-10-CM

## 2019-03-04 ENCOUNTER — APPOINTMENT (OUTPATIENT)
Dept: PLASTIC SURGERY | Facility: CLINIC | Age: 69
End: 2019-03-04
Payer: MEDICARE

## 2019-03-04 VITALS — BODY MASS INDEX: 29.82 KG/M2 | WEIGHT: 190 LBS | HEIGHT: 67 IN

## 2019-03-04 DIAGNOSIS — Z78.9 OTHER SPECIFIED HEALTH STATUS: ICD-10-CM

## 2019-03-04 DIAGNOSIS — Z87.19 PERSONAL HISTORY OF OTHER DISEASES OF THE DIGESTIVE SYSTEM: ICD-10-CM

## 2019-03-04 DIAGNOSIS — Z86.39 PERSONAL HISTORY OF OTHER ENDOCRINE, NUTRITIONAL AND METABOLIC DISEASE: ICD-10-CM

## 2019-03-04 PROCEDURE — 99024 POSTOP FOLLOW-UP VISIT: CPT

## 2019-03-04 NOTE — REASON FOR VISIT
[Post Hospitalization] : a post hospitalization visit [FreeTextEntry1] : [facility]; PMD: Ana

## 2019-03-04 NOTE — PHYSICAL EXAM
[de-identified] : Left dorsal hand with multiple lacerations- 1st ulnar P3, 3rd DIP (extends to volar aspect), 4th PIP, and 5th proximal phalanx; pins C/D/I to 3rd P2, 4th P2, and 5th P1 with external fixation; sensation grossly intact, diminished to dorsal 3rd digit DIP distal to laceration, capillary refill <2 sec all digits; normal tenodesis, mild diffuse swelling as expected, no significant erythema, no active drainage or purulence; palmaris longus tendon present
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
## 2019-03-04 NOTE — DATA REVIEWED
[FreeTextEntry1] : EXAM: XR HAND MIN 3 VIEWS LT \par \par \par PROCEDURE DATE: 02/22/2019 \par \par \par \par \par INTERPRETATION: Clinical History / Reason for exam: Trauma. \par \par 4 views with a soft tissue dressing.. \par \par Findings/ \par impression: Soft tissue swelling and injury. Comminuted displaced angulated \par fifth proximal phalangeal intra-articular fracture extending into the \par metacarpal phalangeal joint. Third distal phalangeal comminuted fracture, \par persistent flexion.. Fourth proximal interphalangeal joint subluxation.. \par Second distal phalangeal 6 mm x 0.3 mm soft tissue metallic foreign body and \par second middle phalangeal 5 mm x 0.3 mm soft tissue metallic foreign body.. \par Wrist, first metacarpal phalangeal joint and radioulnar joint degenerative \par changes. Navicular distal pole cyst. \par \par \par \par \par \par \par \par \par HILL REZA M.D., ATTENDING RADIOLOGIST \par This document has been electronically signed. Feb 22 2019 11:24AM \par \par \par ---\par \par \par EXAM: XR HAND 2 VIEWS LT \par \par \par PROCEDURE DATE: 02/22/2019 \par \par \par \par \par INTERPRETATION: Clinical History / Reason for exam: Trauma. Post reduction. \par Preoperative evaluation. \par \par Technique: 3 views, left hand \par \par Comparison: Hand radiographs of 2/22/2019 10:57 AM \par \par \par FINDINGS/ \par IMPRESSION: \par \par Interval placement of fiberglass splint which obscures some detail. \par \par Fifth digit: proximal phalanx acute comminuted fracture involving MCP \par articulation. Improved osseous alignment with approximately 30 degrees of \par dorsal angulation of the distal fragment, best seen on lateral view. \par \par Fourth digit: acute displaced impacted fracture of the proximal phalanx, \par involving distal head/neck junction \par \par Third digit: Middle phalanx comminuted fracture post reduction and in \par improved anatomic alignment on the frontal view and obscured on the lateral \par view. \par \par Second digit: Linear 7 mm radiopaque foreign object within the volar distal \par second digit soft tissues. \par \par Wrist, first metacarpal phalangeal joint and radioulnar joint degenerative \par changes. Navicular distal pole cyst with degenerative changes/narrowing of \par the STT articulation. \par \par \par \par \par \par \par RACIEL ALLEN M.D., RESIDENT RADIOLOGIST \par This document has been electronically signed. \par JACK LARA M.D., ATTENDING RADIOLOGIST \par This document has been electronically signed. Feb 23 2019 7:11AM \par
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
## 2019-03-05 ENCOUNTER — OUTPATIENT (OUTPATIENT)
Dept: OUTPATIENT SERVICES | Facility: HOSPITAL | Age: 69
LOS: 1 days | Discharge: HOME | End: 2019-03-05

## 2019-03-05 DIAGNOSIS — Z96.659 PRESENCE OF UNSPECIFIED ARTIFICIAL KNEE JOINT: Chronic | ICD-10-CM

## 2019-03-05 DIAGNOSIS — S69.92XA UNSPECIFIED INJURY OF LEFT WRIST, HAND AND FINGER(S), INITIAL ENCOUNTER: Chronic | ICD-10-CM

## 2019-03-05 DIAGNOSIS — S62.92XD UNSPECIFIED FRACTURE OF LEFT WRIST AND HAND, SUBSEQUENT ENCOUNTER FOR FRACTURE WITH ROUTINE HEALING: ICD-10-CM

## 2019-03-07 DIAGNOSIS — Z02.9 ENCOUNTER FOR ADMINISTRATIVE EXAMINATIONS, UNSPECIFIED: ICD-10-CM

## 2019-03-11 ENCOUNTER — FORM ENCOUNTER (OUTPATIENT)
Age: 69
End: 2019-03-11

## 2019-03-11 ENCOUNTER — APPOINTMENT (OUTPATIENT)
Dept: PLASTIC SURGERY | Facility: CLINIC | Age: 69
End: 2019-03-11
Payer: MEDICARE

## 2019-03-11 PROCEDURE — 99024 POSTOP FOLLOW-UP VISIT: CPT
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
## 2019-03-11 NOTE — DATA REVIEWED
[FreeTextEntry1] : EXAM: XR HAND MIN 3 VIEWS LT \par \par \par PROCEDURE DATE: 03/05/2019 \par \par \par \par \par INTERPRETATION: Clinical History / Reason for exam: Fracture follow-up. \par \par Technique: 3 views of the left hand. \par \par Comparison: Left hand radiograph 2/22/2019. \par \par Findings/ \par IMPRESSION: \par \par Fiberglas splint which obscures fine bony detail. \par \par Status post three K wires seen across the fifth proximal phalanx with \par redemonstration of comminuted fractures extending to the MCP joint with \par slightly improved alignment. \par \par Two K wires are noted across the within the fourth proximal phalangeal head \par and two K wires across the third middle phalangeal head with redemonstration \par of acute fractures with improved near anatomic alignment. \par \par Radiopaque foreign bodies measuring larger one up to 7 mm within the volar \par aspect of the second digit are again noted. \par \par Degenerative changes of the distal radioulnar, triscaphe, basal and first \par MCP joints. Lucency at the rostral aspect of the lunate and triquetrum, \par indicative of ulnocarpal impaction syndrome. \par \par \par \par \par PEGGY MILLER M.D., RESIDENT RADIOLOGIST \par This document has been electronically signed. \par IRON PAYNE M.D., ATTENDING RADIOLOGIST \par This document has been electronically signed. Mar 5 2019 2:51PM \par
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
## 2019-03-11 NOTE — PHYSICAL EXAM
[de-identified] : Left dorsal hand with multiple lacerations- 1st ulnar P3, 3rd DIP (extends to volar aspect), 4th PIP, and 5th proximal phalanx; pins C/D/I to 3rd P2, 4th P2, and 5th P1 with external fixation; sensation grossly intact, diminished to dorsal 3rd digit DIP distal to laceration, capillary refill <2 sec all digits; normal tenodesis, diffuse swelling improving, no significant erythema, no active drainage or purulence; palmaris longus tendon present

## 2019-03-12 ENCOUNTER — OUTPATIENT (OUTPATIENT)
Dept: OUTPATIENT SERVICES | Facility: HOSPITAL | Age: 69
LOS: 1 days | Discharge: HOME | End: 2019-03-12

## 2019-03-12 DIAGNOSIS — S62.92XD UNSPECIFIED FRACTURE OF LEFT WRIST AND HAND, SUBSEQUENT ENCOUNTER FOR FRACTURE WITH ROUTINE HEALING: ICD-10-CM

## 2019-03-12 DIAGNOSIS — Z96.659 PRESENCE OF UNSPECIFIED ARTIFICIAL KNEE JOINT: Chronic | ICD-10-CM

## 2019-03-12 DIAGNOSIS — S69.92XA UNSPECIFIED INJURY OF LEFT WRIST, HAND AND FINGER(S), INITIAL ENCOUNTER: Chronic | ICD-10-CM

## 2019-03-14 DIAGNOSIS — Z02.9 ENCOUNTER FOR ADMINISTRATIVE EXAMINATIONS, UNSPECIFIED: ICD-10-CM

## 2019-03-18 ENCOUNTER — APPOINTMENT (OUTPATIENT)
Dept: PLASTIC SURGERY | Facility: CLINIC | Age: 69
End: 2019-03-18
Payer: MEDICARE

## 2019-03-18 PROCEDURE — 99024 POSTOP FOLLOW-UP VISIT: CPT

## 2019-03-18 NOTE — DATA REVIEWED
[FreeTextEntry1] : \par EXAM: XR HAND MIN 3 VIEWS LT \par \par \par PROCEDURE DATE: 03/12/2019 \par \par \par \par \par INTERPRETATION: Clinical History / Reason for exam: Pain \par \par Technique: 4 views left hand \par \par Overlying splint limits assessment.Evaluation is limited given finger \par flexion. \par \par Findings/ \par impression: \par Patient is status post multiple 3 K wire placement through comminuted fifth \par proximal phalangeal fracture with no bridging callus and unchanged alignment \par with apical volar angulation at fracture site. \par \par Two K wires are noted across the within the fourth proximal phalangeal head \par and two K wires across the third middle phalangeal head with limited \par assessment given finger flexion and no significant interval change. \par Radiopaque foreign bodies measuring larger one up to 7 mm within the volar \par aspect of the second digit are again noted. \par \par Degenerative changes of the distal radioulnar, triscaphe, basal and first \par MCP joints. Lucency at the rostral aspect of the lunate and triquetrum, \par indicative of ulnocarpal impaction syndrome. \par \par \par \par \par \par \par \par \par \par \par \par \par \par POLINA CHOWDHURY M.D., ATTENDING RADIOLOGIST \par This document has been electronically signed. Mar 12 2019 2:27PM \par

## 2019-03-18 NOTE — PHYSICAL EXAM
[de-identified] : well-appearing, NAD [de-identified] : Left dorsal hand with multiple lacerations- 1st ulnar P3, 3rd DIP (extends to volar aspect), 4th PIP, and 5th proximal phalanx; pins C/D/I to 3rd P2, 4th P2, and 5th P1 with external fixation; sensation grossly intact, diminished to dorsal 3rd digit DIP distal to laceration, capillary refill <2 sec all digits; normal tenodesis, diffuse swelling improved, no significant erythema, no active drainage or purulence
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
## 2019-03-26 ENCOUNTER — FORM ENCOUNTER (OUTPATIENT)
Age: 69
End: 2019-03-26

## 2019-03-27 ENCOUNTER — OUTPATIENT (OUTPATIENT)
Dept: OUTPATIENT SERVICES | Facility: HOSPITAL | Age: 69
LOS: 1 days | Discharge: HOME | End: 2019-03-27

## 2019-03-27 DIAGNOSIS — S69.92XA UNSPECIFIED INJURY OF LEFT WRIST, HAND AND FINGER(S), INITIAL ENCOUNTER: Chronic | ICD-10-CM

## 2019-03-27 DIAGNOSIS — S62.92XD UNSPECIFIED FRACTURE OF LEFT WRIST AND HAND, SUBSEQUENT ENCOUNTER FOR FRACTURE WITH ROUTINE HEALING: ICD-10-CM

## 2019-03-27 DIAGNOSIS — Z96.659 PRESENCE OF UNSPECIFIED ARTIFICIAL KNEE JOINT: Chronic | ICD-10-CM

## 2019-03-28 ENCOUNTER — APPOINTMENT (OUTPATIENT)
Dept: PLASTIC SURGERY | Facility: CLINIC | Age: 69
End: 2019-03-28
Payer: MEDICARE

## 2019-03-28 PROCEDURE — 99024 POSTOP FOLLOW-UP VISIT: CPT
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
## 2019-03-28 NOTE — PHYSICAL EXAM
[de-identified] : well-appearing, NAD [de-identified] : Left dorsal hand with multiple lacerations- 1st ulnar P3, 3rd DIP (extends to volar aspect), 4th PIP, and 5th proximal phalanx; pins C/D/I to 3rd P2, 4th P2, and 5th P1 with external fixation; sensation grossly intact, diminished to dorsal 3rd digit DIP distal to laceration, capillary refill <2 sec all digits; normal tenodesis, no significant erythema, no active drainage or purulence\par Nontender 3rd and 4th digit fracture sites. Minimal tenderness to 5th digit P1 fracture site; no change in anatomic alignment since last visit
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
## 2019-03-29 DIAGNOSIS — Z02.9 ENCOUNTER FOR ADMINISTRATIVE EXAMINATIONS, UNSPECIFIED: ICD-10-CM

## 2019-04-12 ENCOUNTER — FORM ENCOUNTER (OUTPATIENT)
Age: 69
End: 2019-04-12

## 2019-04-13 ENCOUNTER — OUTPATIENT (OUTPATIENT)
Dept: OUTPATIENT SERVICES | Facility: HOSPITAL | Age: 69
LOS: 1 days | Discharge: HOME | End: 2019-04-13
Payer: MEDICARE

## 2019-04-13 DIAGNOSIS — S62.92XD UNSPECIFIED FRACTURE OF LEFT WRIST AND HAND, SUBSEQUENT ENCOUNTER FOR FRACTURE WITH ROUTINE HEALING: ICD-10-CM

## 2019-04-13 DIAGNOSIS — S69.92XA UNSPECIFIED INJURY OF LEFT WRIST, HAND AND FINGER(S), INITIAL ENCOUNTER: Chronic | ICD-10-CM

## 2019-04-13 DIAGNOSIS — Z96.659 PRESENCE OF UNSPECIFIED ARTIFICIAL KNEE JOINT: Chronic | ICD-10-CM

## 2019-04-13 PROCEDURE — 73130 X-RAY EXAM OF HAND: CPT | Mod: 26,LT

## 2019-04-15 ENCOUNTER — APPOINTMENT (OUTPATIENT)
Dept: PLASTIC SURGERY | Facility: CLINIC | Age: 69
End: 2019-04-15
Payer: OTHER MISCELLANEOUS

## 2019-04-15 PROCEDURE — 99024 POSTOP FOLLOW-UP VISIT: CPT

## 2019-04-15 NOTE — DATA REVIEWED
[FreeTextEntry1] : EXAM: XR HAND MIN 3 VIEWS LT \par \par \par PROCEDURE DATE: 04/13/2019 \par \par \par \par \par INTERPRETATION: Clinical History / Reason for exam: Fracture \par \par 4 views of the left hand. \par \par Comparison: March 27, 2019 \par \par Findings/ \par impression: \par \par Again seen is patient post 3 K wire fixation through comminuted fifth \par proximal phalangeal fracture with slightly more mature nonbridging callus. \par \par Third and fourth digit wires are removed with near complete bony union of \par the fourth proximal phalangeal fracture. Apparent delayed union of the third \par mid phalanx is noted. \par \par Radiopaque foreign bodies measure up to 7 mm within the volar aspect of the \par second digit again noted. \par \par Degenerative changes throughout the wrist and first MCP joints, unchanged \par from the prior exam. \par \par \par \par \par \par \par \par \par \par \par \par JACK LARA M.D., ATTENDING RADIOLOGIST \par This document has been electronically signed. Apr 14 2019 8:59AM
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
## 2019-04-15 NOTE — PHYSICAL EXAM
[de-identified] : Left dorsal hand with multiple lacerations- 1st ulnar P3, 3rd DIP (extends to volar aspect), 4th PIP, and 5th proximal phalanx; pins C/D/I to 3rd P2, 4th P2, and 5th P1 with external fixation; sensation grossly intact, diminished to dorsal 3rd digit DIP distal to laceration, capillary refill <2 sec all digits; normal tenodesis, no significant erythema, no active drainage or purulence\par Nontender 3rd and 4th digit fracture sites. Minimal tenderness to 5th digit P1 fracture site; no change in anatomic alignment since last visit [de-identified] : well-appearing, NAD

## 2019-05-01 ENCOUNTER — FORM ENCOUNTER (OUTPATIENT)
Age: 69
End: 2019-05-01

## 2019-05-02 ENCOUNTER — OUTPATIENT (OUTPATIENT)
Dept: OUTPATIENT SERVICES | Facility: HOSPITAL | Age: 69
LOS: 1 days | Discharge: HOME | End: 2019-05-02
Payer: SELF-PAY

## 2019-05-02 DIAGNOSIS — S69.92XA UNSPECIFIED INJURY OF LEFT WRIST, HAND AND FINGER(S), INITIAL ENCOUNTER: Chronic | ICD-10-CM

## 2019-05-02 DIAGNOSIS — Z96.659 PRESENCE OF UNSPECIFIED ARTIFICIAL KNEE JOINT: Chronic | ICD-10-CM

## 2019-05-02 DIAGNOSIS — S62.92XD UNSPECIFIED FRACTURE OF LEFT WRIST AND HAND, SUBSEQUENT ENCOUNTER FOR FRACTURE WITH ROUTINE HEALING: ICD-10-CM

## 2019-05-02 PROCEDURE — 73130 X-RAY EXAM OF HAND: CPT | Mod: 26,LT

## 2019-05-03 ENCOUNTER — APPOINTMENT (OUTPATIENT)
Dept: PLASTIC SURGERY | Facility: CLINIC | Age: 69
End: 2019-05-03
Payer: MEDICARE

## 2019-05-03 PROCEDURE — 99024 POSTOP FOLLOW-UP VISIT: CPT
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
## 2019-05-03 NOTE — PHYSICAL EXAM
[de-identified] : well-appearing, NAD [de-identified] : Left dorsal hand with multiple well-healed lacerations; sensation grossly intact, capillary refill <2 sec all digits; normal tenodesis, no significant erythema, no active drainage or purulence\par Tender 3rd and 4th digit fracture sites. Minimal tenderness to 5th digit P1 fracture site; no change in anatomic alignment since last visit\par Normal left  resting finger cascade. Improving ROM 3-5th MCP AROM/PROM
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
## 2019-05-06 DIAGNOSIS — Z02.9 ENCOUNTER FOR ADMINISTRATIVE EXAMINATIONS, UNSPECIFIED: ICD-10-CM

## 2019-05-22 ENCOUNTER — FORM ENCOUNTER (OUTPATIENT)
Age: 69
End: 2019-05-22

## 2019-05-23 ENCOUNTER — OUTPATIENT (OUTPATIENT)
Dept: OUTPATIENT SERVICES | Facility: HOSPITAL | Age: 69
LOS: 1 days | Discharge: HOME | End: 2019-05-23
Payer: COMMERCIAL

## 2019-05-23 DIAGNOSIS — S62.92XD UNSPECIFIED FRACTURE OF LEFT WRIST AND HAND, SUBSEQUENT ENCOUNTER FOR FRACTURE WITH ROUTINE HEALING: ICD-10-CM

## 2019-05-23 DIAGNOSIS — S69.92XA UNSPECIFIED INJURY OF LEFT WRIST, HAND AND FINGER(S), INITIAL ENCOUNTER: Chronic | ICD-10-CM

## 2019-05-23 DIAGNOSIS — Z96.659 PRESENCE OF UNSPECIFIED ARTIFICIAL KNEE JOINT: Chronic | ICD-10-CM

## 2019-05-23 PROCEDURE — 73130 X-RAY EXAM OF HAND: CPT | Mod: 26,LT

## 2019-05-24 ENCOUNTER — APPOINTMENT (OUTPATIENT)
Dept: PLASTIC SURGERY | Facility: CLINIC | Age: 69
End: 2019-05-24
Payer: MEDICARE

## 2019-05-24 PROCEDURE — 99024 POSTOP FOLLOW-UP VISIT: CPT
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
## 2019-05-24 NOTE — PHYSICAL EXAM
[de-identified] : well-appearing, NAD [de-identified] : Left dorsal hand with multiple well-healed lacerations; sensation grossly intact, capillary refill <2 sec all digits; normal tenodesis and finger cascade, no significant erythema\par Nontender 3rd, 4th and 5th digit fracture sites. stable 3rd digit fracture site; no change in anatomic alignment of 5th digit\par Improving ROM 3-5th MCP and PIPJ AROM/PROM.  Improving left wrist ROM
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
## 2019-05-24 NOTE — DATA REVIEWED
[FreeTextEntry1] : Hand x-ray reviewed\par \par -There is interval bridging bone callus of 3rd digit fracture

## 2019-05-29 DIAGNOSIS — Z02.9 ENCOUNTER FOR ADMINISTRATIVE EXAMINATIONS, UNSPECIFIED: ICD-10-CM

## 2019-07-08 ENCOUNTER — FORM ENCOUNTER (OUTPATIENT)
Age: 69
End: 2019-07-08

## 2019-07-09 ENCOUNTER — OUTPATIENT (OUTPATIENT)
Dept: OUTPATIENT SERVICES | Facility: HOSPITAL | Age: 69
LOS: 1 days | Discharge: HOME | End: 2019-07-09
Payer: MEDICARE

## 2019-07-09 DIAGNOSIS — S62.92XD UNSPECIFIED FRACTURE OF LEFT WRIST AND HAND, SUBSEQUENT ENCOUNTER FOR FRACTURE WITH ROUTINE HEALING: ICD-10-CM

## 2019-07-09 DIAGNOSIS — S69.92XA UNSPECIFIED INJURY OF LEFT WRIST, HAND AND FINGER(S), INITIAL ENCOUNTER: Chronic | ICD-10-CM

## 2019-07-09 DIAGNOSIS — Z96.659 PRESENCE OF UNSPECIFIED ARTIFICIAL KNEE JOINT: Chronic | ICD-10-CM

## 2019-07-09 PROCEDURE — 73130 X-RAY EXAM OF HAND: CPT | Mod: 26,LT

## 2019-07-10 ENCOUNTER — APPOINTMENT (OUTPATIENT)
Dept: PLASTIC SURGERY | Facility: CLINIC | Age: 69
End: 2019-07-10
Payer: MEDICARE

## 2019-07-10 PROCEDURE — 99213 OFFICE O/P EST LOW 20 MIN: CPT

## 2019-07-10 NOTE — PHYSICAL EXAM
[de-identified] : well-appearing, NAD [de-identified] : Left dorsal hand with multiple well-healed lacerations; sensation grossly intact, capillary refill <2 sec all digits; normal tenodesis and finger cascade, no significant erythema\par Nontender 3rd, 4th and 5th digit fracture sites. stable 3rd digit fracture site; no change in anatomic alignment of 5th digit\par Improving ROM 3-5th MCP and PIPJ AROM/PROM.  3rd DIPJ with no AROM/PROM.  Improving left wrist ROM
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
## 2019-08-23 ENCOUNTER — APPOINTMENT (OUTPATIENT)
Dept: PLASTIC SURGERY | Facility: CLINIC | Age: 69
End: 2019-08-23
Payer: MEDICARE

## 2019-08-23 PROCEDURE — 99024 POSTOP FOLLOW-UP VISIT: CPT

## 2019-08-23 NOTE — PHYSICAL EXAM
[de-identified] : well-appearing, NAD [de-identified] : Left dorsal hand with multiple well-healed lacerations; sensation grossly intact, capillary refill <2 sec all digits; normal tenodesis and finger cascade, no significant erythema\par Nontender 3rd, 4th and 5th digit fracture sites. stable 3rd digit fracture site; no change in anatomic alignment of 5th digit\par Improving ROM 3-5th MCP and PIPJ AROM/PROM.  3rd DIPJ with no AROM/PROM.  Improving left wrist ROM
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
## 2019-09-27 ENCOUNTER — APPOINTMENT (OUTPATIENT)
Dept: PLASTIC SURGERY | Facility: CLINIC | Age: 69
End: 2019-09-27
Payer: MEDICARE

## 2019-09-27 PROCEDURE — 99213 OFFICE O/P EST LOW 20 MIN: CPT

## 2019-09-27 NOTE — PHYSICAL EXAM
[de-identified] : Left dorsal hand with multiple well-healed lacerations; normal tenodesis and finger cascade except 3rd distal phalanx, no FDP tendon intact\par Nontender 3rd, 4th and 5th digit fracture sites; no change in anatomic alignment of 5th digit\par Significant ROM 3-5th MCP and PIPJ AROM/PROM.  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 [de-identified] : well-appearing, NAD
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
## 2019-10-03 ENCOUNTER — OUTPATIENT (OUTPATIENT)
Dept: OUTPATIENT SERVICES | Facility: HOSPITAL | Age: 69
LOS: 1 days | Discharge: HOME | End: 2019-10-03

## 2019-10-03 DIAGNOSIS — S62.92XD UNSPECIFIED FRACTURE OF LEFT WRIST AND HAND, SUBSEQUENT ENCOUNTER FOR FRACTURE WITH ROUTINE HEALING: ICD-10-CM

## 2019-10-03 DIAGNOSIS — Z96.659 PRESENCE OF UNSPECIFIED ARTIFICIAL KNEE JOINT: Chronic | ICD-10-CM

## 2019-10-03 DIAGNOSIS — S62.623B DISPLACED FRACTURE OF MIDDLE PHALANX OF LEFT MIDDLE FINGER, INITIAL ENCOUNTER FOR OPEN FRACTURE: ICD-10-CM

## 2019-10-03 DIAGNOSIS — S69.92XA UNSPECIFIED INJURY OF LEFT WRIST, HAND AND FINGER(S), INITIAL ENCOUNTER: Chronic | ICD-10-CM

## 2019-11-08 ENCOUNTER — APPOINTMENT (OUTPATIENT)
Dept: PLASTIC SURGERY | Facility: CLINIC | Age: 69
End: 2019-11-08
Payer: MEDICARE

## 2019-11-08 DIAGNOSIS — S66.902A UNSPECIFIED INJURY OF UNSPECIFIED MUSCLE, FASCIA AND TENDON AT WRIST AND HAND LEVEL, LEFT HAND, INITIAL ENCOUNTER: ICD-10-CM

## 2019-11-08 DIAGNOSIS — S66.822A LACERATION OF OTHER SPECIFIED MUSCLES, FASCIA AND TENDONS AT WRIST AND HAND LEVEL, LEFT HAND, INITIAL ENCOUNTER: ICD-10-CM

## 2019-11-08 DIAGNOSIS — S62.92XD UNSPECIFIED FRACTURE OF LEFT WRIST AND HAND, SUBSEQUENT ENCOUNTER FOR FRACTURE WITH ROUTINE HEALING: ICD-10-CM

## 2019-11-08 DIAGNOSIS — M25.642 STIFFNESS OF LEFT HAND, NOT ELSEWHERE CLASSIFIED: ICD-10-CM

## 2019-11-08 PROCEDURE — 99213 OFFICE O/P EST LOW 20 MIN: CPT

## 2019-11-08 NOTE — PHYSICAL EXAM
[de-identified] : well-appearing, NAD [de-identified] : Left dorsal hand with multiple well-healed lacerations; normal tenodesis and finger cascade except 3rd distal phalanx, no FDP tendon intact\par Nontender 3rd, 4th and 5th digit fracture sites; no change in anatomic alignment of 5th digit\par Significant ROM 3-5th MCP and PIPJ AROM/PROM.  4th PIPJ joint contracture, able to flex to 90 deg, 3rd DIPJ with no AROM/PROM.
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
## 2020-02-07 ENCOUNTER — APPOINTMENT (OUTPATIENT)
Dept: PLASTIC SURGERY | Facility: CLINIC | Age: 70
End: 2020-02-07

## 2020-10-27 ENCOUNTER — RESULT REVIEW (OUTPATIENT)
Age: 70
End: 2020-10-27

## 2020-10-27 ENCOUNTER — OUTPATIENT (OUTPATIENT)
Dept: OUTPATIENT SERVICES | Facility: HOSPITAL | Age: 70
LOS: 1 days | Discharge: HOME | End: 2020-10-27
Payer: MEDICARE

## 2020-10-27 DIAGNOSIS — M20.62 ACQUIRED DEFORMITIES OF TOE(S), UNSPECIFIED, LEFT FOOT: ICD-10-CM

## 2020-10-27 DIAGNOSIS — S69.92XA UNSPECIFIED INJURY OF LEFT WRIST, HAND AND FINGER(S), INITIAL ENCOUNTER: Chronic | ICD-10-CM

## 2020-10-27 DIAGNOSIS — Z96.659 PRESENCE OF UNSPECIFIED ARTIFICIAL KNEE JOINT: Chronic | ICD-10-CM

## 2020-10-27 DIAGNOSIS — M79.672 PAIN IN LEFT FOOT: ICD-10-CM

## 2020-10-27 PROCEDURE — 76882 US LMTD JT/FCL EVL NVASC XTR: CPT | Mod: 26,LT

## 2021-01-25 ENCOUNTER — APPOINTMENT (OUTPATIENT)
Dept: PLASTIC SURGERY | Facility: CLINIC | Age: 71
End: 2021-01-25
Payer: MEDICARE

## 2021-01-25 PROCEDURE — 99212 OFFICE O/P EST SF 10 MIN: CPT

## 2021-01-25 NOTE — PHYSICAL EXAM
[de-identified] : well-appearing, NAD [de-identified] : Left dorsal hand with multiple well-healed lacerations; normal tenodesis and finger cascade except 3rd distal phalanx, no FDP tendon intact\par Nontender 3rd, 4th and 5th digit fracture sites; no change in anatomic alignment of 5th digit\par Significant ROM 3-5th MCP and PIPJ AROM/PROM.  4th PIPJ joint contracture, able to flex to 90 deg, 3rd DIPJ with no AROM/PROM.
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
## 2021-06-03 NOTE — H&P ADULT - NSHPATTENDINGPLANDISCUSS_GEN_ALL_CORE
RHEUMATOLOGY FOLLOW UP VISIT    6/3/2021  Patient Name: Anayeli Huertas  : 1945  Medical Record: [id number]    CHIEF COMPLAINT:   No chief complaint on file.      HISTORY OF PRESENT ILLNESS  Anayeli Huertas is a 75 year old female who is being seen for follow up evaluation of osteoporosis    Previous fractures: L3 compression fracture Oct 2016 - no pain since kyphoplasty.  No new fractures since last visit  Falls: No   Loss of height: 1/2 inch  Risk Factors: steroids no   Menopause: yes Age: 50  Exercise: yes  Smoking: NO   Alcohol: NO         Calcium intake - yes  Vit D intake - 2000 U daily  Treatment used in the past: boniva oral from 6355-0466.  Prolia started in .  Last dose 2020      Past Medical History:   Diagnosis Date   • Diabetes mellitus (CMS/[facility])    • Essential (primary) hypertension    • Osteoarthritis      Past Surgical History:   Procedure Laterality Date   • Kyphoplasty       Social History     Socioeconomic History   • Marital status: /Civil Union     Spouse name: Not on file   • Number of children: Not on file   • Years of education: Not on file   • Highest education level: Not on file   Occupational History   • Not on file   Tobacco Use   • Smoking status: Never Smoker   • Smokeless tobacco: Never Used   Substance and Sexual Activity   • Alcohol use: Yes     Comment: social drinker   • Drug use: Never   • Sexual activity: Not on file   Other Topics Concern   • Not on file   Social History Narrative   • Not on file     Social Determinants of Health     Financial Resource Strain:    • Social Determinants: Financial Resource Strain:    Food Insecurity:    • Social Determinants: Food Insecurity:    Transportation Needs:    • Lack of Transportation (Medical):    • Lack of Transportation (Non-Medical):    Physical Activity:    • Days of Exercise per Week:    • Minutes of Exercise per Session:    Stress:    • Social Determinants: Stress:    Social Connections:    • Social  Determinants: Social Connections:    Intimate Partner Violence:    • Social Determinants: Intimate Partner Violence Past Fear:    • Social Determinants: Intimate Partner Violence Current Fear:      No family history on file.        MEDICATIONS  Current Outpatient Medications   Medication Sig Dispense Refill   • ferrous sulfate 325 (65 FE) MG tablet Take 325 mg by mouth.     • Januvia 100 MG tablet TK 1 T PO  QD     • Januvia 50 MG tablet TK 1 T PO QD     • Vitamin D, Ergocalciferol, 1.25 mg (50,000 units) capsule Take 1 capsule by mouth 1 day a week. 12 capsule 0   • Blood Glucose Monitoring Suppl (ACCU-CHEK JOE PLUS) w/Device Kit      • SOFTCLIX LANCETS Mis      • ACCU-CHEK JOE PLUS test strip      • aspirin (ECOTRIN) 81 MG EC tablet Take 81 mg by mouth daily.     • glipiZIDE (GLUCOTROL) 5 MG tablet Take by mouth daily.     • latanoprost (XALATAN) 0.005 % ophthalmic solution Place 1 drop into both eyes daily.      • Magnesium 400 MG Tab Take 400 mg by mouth daily.      • Multiple Vitamins-Minerals (MULTIVITAMIN WITH MINERALS) tablet Take 1 tablet by mouth daily.     • rosuvastatin (CRESTOR) 5 MG tablet Take 5 mg by mouth daily.      • Denosumab (PROLIA SC) Inject 60 mg into the skin.      • Vitamin D, Cholecalciferol, 50 mcg (2,000 units) capsule Take 50 mcg by mouth daily.      • amLODIPine (NORVASC) 5 MG tablet TAKE 1 TABLET BY MOUTH DAILY     • lisinopril (ZESTRIL) 10 MG tablet TAKE 1 TABLET BY MOUTH TWICE DAILY       Current Facility-Administered Medications   Medication Dose Route Frequency Provider Last Rate Last Admin   • DENOSumab (PROLIA) SubQ injection 60 mg  60 mg Subcutaneous Q6 Months Devora Lee MD   60 mg at 11/30/20 1115       ALLERGIES  ALLERGIES:   Allergen Reactions   • Naproxen PRURITUS and Other (See Comments)     unknown   • Aleve Other (See Comments)     unknown         Review of Systems   Constitutional: Negative for chills, fatigue and fever.   Cardiovascular: Negative for  chest pain and leg swelling.   Skin: Negative for pallor and rash.          PHYSICAL EXAM   There were no vitals filed for this visit.      General appearance: Pt appears well developed, well nourished with attention to grooming  Skin: No rash. No induration  Eyes: Conjunctiva and lids moist and without inflammation, no icterus noted  ENT: no ulcers, no erythema  Musculoskeletal:  No synovitis noted in the joints of upper or lower extremity     Neuro: A&Ox4, strength is 5/5 in bilateral upper and lower extremity. no gross motor or sensory defects  Psychiatric: Mood and affect are appropriate        LABS:  Component      Latest Ref Rng & Units 6/2/2021   Fasting Status      Hours 12   Sodium      135 - 145 mmol/L 141   Potassium      3.4 - 5.1 mmol/L 5.0   Chloride      98 - 107 mmol/L 107   CO2      21 - 32 mmol/L 24   ANION GAP      10 - 20 mmol/L 15   CALCIUM      8.4 - 10.2 mg/dL 9.4   PHOSPHORUS      2.4 - 4.7 mg/dL 5.6 (H)   Albumin      3.6 - 5.1 g/dL 3.9   Glucose      65 - 99 mg/dL 71   BUN      6 - 20 mg/dL 24 (H)   Creatinine      0.51 - 0.95 mg/dL 1.33 (H)   Glomerular Filtration Rate      >90 mL/min/1.73m2 39 (L)   BUN/CREATININE RATIO      7 - 25 18         IMAGING:         Aug 26, 2020 11:26 AM   Associated Diagnoses    Age-related osteoporosis with current pathological fracture with routine healing       IR Timeline    IR Event Log   PACS Images     Show images for BD DEXA SCAN AXIAL SKELETON   Result Information    Date and Time: Exam End: 8/26/2020 11:26 Status: Final result Provider Status: Reviewed   Priority: Routine          Study Result    EXAM: BD DEXA AXIAL SKELETON     CLINICAL INDICATION : Osteoporosis     Scan Unit: [employer] (Discovery unit)     COMPARISON: 08/15/2018     FINDINGS:           LUMBAR SPINE:  T-SCORE: -2.3.  7% interval increase, artifactual            LEFT HIP:        Total analyzed area: T-SCORE:    -2.8.     Femoral Neck:  T-SCORE:   -3.8.  No  change        IMPRESSION:  Osteoporosis.  No change.            Assessment and plan  #Osteoporosis  #Long-term Prolia use    -Continue Ca 1200 mg a day   -Continue Vit D 2000 units daily   -educated on diet and lifestyle modifications for Osteoporosis  -last DEXA scan was in August 2020 was reviewed and discussed with the patient today.  Due for next in August 2022  -continue prolia 60 mg sc every 6 months      RTC - 6 months      The patient indicates understanding of these issues and agrees with the plan.  All of patients questions were answered    Devora Lee MD, [facility]   patient, Surgery consult resident

## 2021-12-14 ENCOUNTER — RESULT REVIEW (OUTPATIENT)
Age: 71
End: 2021-12-14

## 2021-12-14 ENCOUNTER — OUTPATIENT (OUTPATIENT)
Dept: OUTPATIENT SERVICES | Facility: HOSPITAL | Age: 71
LOS: 1 days | Discharge: HOME | End: 2021-12-14
Payer: MEDICARE

## 2021-12-14 DIAGNOSIS — S69.92XA UNSPECIFIED INJURY OF LEFT WRIST, HAND AND FINGER(S), INITIAL ENCOUNTER: Chronic | ICD-10-CM

## 2021-12-14 DIAGNOSIS — Z96.659 PRESENCE OF UNSPECIFIED ARTIFICIAL KNEE JOINT: Chronic | ICD-10-CM

## 2021-12-14 DIAGNOSIS — M79.672 PAIN IN LEFT FOOT: ICD-10-CM

## 2021-12-14 DIAGNOSIS — M79.673 PAIN IN UNSPECIFIED FOOT: ICD-10-CM

## 2021-12-14 PROCEDURE — 76882 US LMTD JT/FCL EVL NVASC XTR: CPT | Mod: 26,LT

## 2022-02-02 ENCOUNTER — APPOINTMENT (OUTPATIENT)
Dept: NEUROLOGY | Facility: CLINIC | Age: 72
End: 2022-02-02

## 2022-05-24 ENCOUNTER — APPOINTMENT (OUTPATIENT)
Dept: NEUROLOGY | Facility: CLINIC | Age: 72
End: 2022-05-24
Payer: MEDICARE

## 2022-05-24 VITALS
BODY MASS INDEX: 27 KG/M2 | SYSTOLIC BLOOD PRESSURE: 132 MMHG | WEIGHT: 172 LBS | DIASTOLIC BLOOD PRESSURE: 77 MMHG | HEART RATE: 60 BPM | HEIGHT: 67 IN | OXYGEN SATURATION: 100 % | TEMPERATURE: 97.8 F

## 2022-05-24 PROCEDURE — 99205 OFFICE O/P NEW HI 60 MIN: CPT

## 2022-05-24 RX ORDER — TAMSULOSIN HYDROCHLORIDE 0.4 MG/1
0.4 CAPSULE ORAL
Refills: 0 | Status: ACTIVE | COMMUNITY

## 2022-05-24 RX ORDER — ESOMEPRAZOLE MAGNESIUM 20 MG/1
TABLET ORAL
Refills: 0 | Status: ACTIVE | COMMUNITY

## 2022-05-24 NOTE — DISCUSSION/SUMMARY
[FreeTextEntry1] : Pt is a 72 yo M with PMHx of BPH, angina (single episode), who presents with c/o bilateral foot drop, gait imbalance, and right hand tremor. \par \par # Bilateral foot drop: given hyperreflexia r/o central process. Labwork including paraneoplastic, HIV, lyme, ESR/CRP, CK. in addition to b/l foot drop, pt also has sensory neuropathy, more prominent in BUE than BLE. \par - MRI brain without contrast\par - MRI cervical spine with/without contrast\par - Labs\par - EMG/NCS\par - Referral to Neuromuscular specialist\par \par # Muscle cramps: workup as above\par - baclofen 5mg BID prn. Driving and operating heavy machinery precautions discussed.\par - PT/OT depending on above findings\par \par \par RTC in 6-8 weeks

## 2022-05-24 NOTE — REASON FOR VISIT
[Initial Evaluation] : an initial evaluation [Spouse] : spouse [FreeTextEntry1] : b/l foot drop, gait difficulty

## 2022-05-24 NOTE — PHYSICAL EXAM
[General Appearance - Alert] : alert [General Appearance - In No Acute Distress] : in no acute distress [General Appearance - Well Nourished] : well nourished [General Appearance - Well Developed] : well developed [Oriented To Time, Place, And Person] : oriented to person, place, and time [Affect] : the affect was normal [Person] : oriented to person [Place] : oriented to place [Time] : oriented to time [Naming Objects] : no difficulty naming common objects [Fluency] : fluency intact [Comprehension] : comprehension intact [Cranial Nerves Optic (II)] : visual acuity intact bilaterally,  visual fields full to confrontation, pupils equal round and reactive to light [Cranial Nerves Oculomotor (III)] : extraocular motion intact [Cranial Nerves Trigeminal (V)] : facial sensation intact symmetrically [Cranial Nerves Facial (VII)] : face symmetrical [Cranial Nerves Vestibulocochlear (VIII)] : hearing was intact bilaterally [Cranial Nerves Glossopharyngeal (IX)] : tongue and palate midline [Cranial Nerves Accessory (XI - Cranial And Spinal)] : head turning and shoulder shrug symmetric [Cranial Nerves Hypoglossal (XII)] : there was no tongue deviation with protrusion [Motor Handedness Left-Handed] : the patient is left hand dominant [0] : ankle eversion 0/5 [5] : knee extension 5/5 [1] : ankle dorsiflexion 1/5 [4] : ankle plantar flexion 4/5 [2] : toe flexion 2/5 [Sensation Tactile Decrease] : light touch was intact [Proprioception] : proprioception was intact [Tremor] : a tremor present [3+] : Patella left 3+ [2+] : Ankle jerk left 2+ [Sclera] : the sclera and conjunctiva were normal [PERRL With Normal Accommodation] : pupils were equal in size, round, reactive to light, with normal accommodation [Outer Ear] : the ears and nose were normal in appearance [Hearing Threshold Finger Rub Not [place]] : hearing was normal [Neck Appearance] : the appearance of the neck was normal [] : no respiratory distress [Respiration, Rhythm And Depth] : normal respiratory rhythm and effort [Heart Rate And Rhythm] : heart rate was normal and rhythm regular [Heart Sounds] : normal S1 and S2 [Arterial Pulses Carotid] : carotid pulses were normal with no bruits [Edema] : there was no peripheral edema [Skin Turgor] : normal skin turgor [Paresis Pronator Drift Right-Sided] : no pronator drift on the right [Paresis Pronator Drift Left-Sided] : no pronator drift on the left [Motor Strength Upper Extremities Right] : strength was normal in the right upper extremity [Motor Strength Upper Extremities Left] : strength was normal in the left upper extremity [Coordination - Dysmetria Impaired Finger-to-Nose Bilateral] : not present [Plantar Reflex Right Only] : normal on the right [Plantar Reflex Left Only] : normal on the left [___] : absent on the right [___] : absent on the left [FreeTextEntry6] : Fasciculations noted in bilateral pectoralis and biceps muscles [FreeTextEntry7] : decreased pin prick up to bilateral elbows and bilateral ankles. Decreased vibration at right great toe otherwise intact [FreeTextEntry8] : bilateral steppage gait
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
## 2022-05-26 ENCOUNTER — TRANSCRIPTION ENCOUNTER (OUTPATIENT)
Age: 72
End: 2022-05-26

## 2022-06-07 ENCOUNTER — TRANSCRIPTION ENCOUNTER (OUTPATIENT)
Age: 72
End: 2022-06-07

## 2022-07-28 ENCOUNTER — APPOINTMENT (OUTPATIENT)
Dept: NEUROLOGY | Facility: CLINIC | Age: 72
End: 2022-07-28

## 2022-07-28 VITALS
HEART RATE: 78 BPM | DIASTOLIC BLOOD PRESSURE: 80 MMHG | BODY MASS INDEX: 26.06 KG/M2 | WEIGHT: 166 LBS | HEIGHT: 67 IN | OXYGEN SATURATION: 99 % | TEMPERATURE: 97.5 F | SYSTOLIC BLOOD PRESSURE: 154 MMHG

## 2022-07-28 DIAGNOSIS — R76.8 OTHER SPECIFIED ABNORMAL IMMUNOLOGICAL FINDINGS IN SERUM: ICD-10-CM

## 2022-07-28 PROCEDURE — 99214 OFFICE O/P EST MOD 30 MIN: CPT

## 2022-07-28 RX ORDER — BACLOFEN 5 MG/1
5 TABLET ORAL
Qty: 60 | Refills: 2 | Status: DISCONTINUED | COMMUNITY
Start: 2022-05-24 | End: 2022-07-28

## 2022-07-28 NOTE — PHYSICAL EXAM
[General Appearance - Alert] : alert [General Appearance - In No Acute Distress] : in no acute distress [General Appearance - Well Nourished] : well nourished [General Appearance - Well Developed] : well developed [Oriented To Time, Place, And Person] : oriented to person, place, and time [Impaired Insight] : insight and judgment were intact [Affect] : the affect was normal [Person] : oriented to person [Place] : oriented to place [Time] : oriented to time [Fluency] : fluency intact [Comprehension] : comprehension intact [Cranial Nerves Optic (II)] : visual acuity intact bilaterally,  visual fields full to confrontation, pupils equal round and reactive to light [Cranial Nerves Oculomotor (III)] : extraocular motion intact [Cranial Nerves Trigeminal (V)] : facial sensation intact symmetrically [Cranial Nerves Facial (VII)] : face symmetrical [Cranial Nerves Vestibulocochlear (VIII)] : hearing was intact bilaterally [Cranial Nerves Glossopharyngeal (IX)] : tongue and palate midline [Cranial Nerves Accessory (XI - Cranial And Spinal)] : head turning and shoulder shrug symmetric [Cranial Nerves Hypoglossal (XII)] : there was no tongue deviation with protrusion [Paresis Pronator Drift Right-Sided] : no pronator drift on the right [Paresis Pronator Drift Left-Sided] : no pronator drift on the left [Motor Strength Upper Extremities Bilaterally] : strength was normal in both upper extremities [Motor Strength Hips Right Weakness] : normal hip strength [Motor Strength Knee Right Weakness] : normal knee strength [Motor Strength Hips Left Weakness] : normal hip strength [Motor Strength Knee Left Weakness] : normal knee strength [0] : ankle dorsiflexion 0/5 [2] : ankle inversion 2/5 [Sensation Tactile Decrease] : light touch was intact [Coordination - Dysmetria Impaired Finger-to-Nose Bilateral] : not present [3+] : Patella left 3+ [FreeTextEntry6] : diffuse BUE./BLE fasciculations [FreeTextEntry8] : setppage gait [Sclera] : the sclera and conjunctiva were normal [PERRL With Normal Accommodation] : pupils were equal in size, round, reactive to light, with normal accommodation [Outer Ear] : the ears and nose were normal in appearance [Hearing Threshold Finger Rub Not [place]] : hearing was normal [Neck Appearance] : the appearance of the neck was normal [] : no respiratory distress [Respiration, Rhythm And Depth] : normal respiratory rhythm and effort [Heart Rate And Rhythm] : heart rate was normal and rhythm regular [Heart Sounds] : normal S1 and S2 [Arterial Pulses Carotid] : carotid pulses were normal with no bruits [Skin Turgor] : normal skin turgor

## 2022-07-28 NOTE — DISCUSSION/SUMMARY
ACUTE OT Evaluation     Therapy Evaluation: PT evaluation needed due to a recent decline in one or more of the following: safety, ambulation, balance or strength.                 Pt seen on 12T nursing unit.                                                          Frequency Comments: T Th      Admitting complaint:: UTI (urinary tract infection) [N39.0]  Yeast infection of the skin [B37.2]  Urinary tract infection without hematuria, site unspecified [N39.0]  Altered mental status, unspecified altered mental status type [R41.82]                                                                                         Precautions  Other Precautions: Fall risk, hx of dementia (09/30/19 0900)    Co-morbidities:   Patient Active Problem List   Diagnosis   • Essential hypertension   • Mixed hyperlipidemia   • OA (osteoarthritis)   • Macular degeneration   • B12 deficiency   • Vitamin D deficiency   • Type II or unspecified type diabetes mellitus without mention of complication, not stated as uncontrolled   • Hallucinations   • Family history of brain aneurysm   • Pruritic rash   • Thyroid activity decreased   • Dementia associated with other underlying disease with behavioral disturbance   • Recurrent UTI   • Late onset Alzheimer's disease with behavioral disturbance   • Bladder infection   • Urinary tract infection without hematuria   • Generalized weakness   • Pleural effusion   • Generalized anxiety disorder   • Acquired hypothyroidism   • History of anemia   • Altered mental status   • Candidal vulvovaginitis   • Acute vaginitis   • Traumatic ulcer, limited to breakdown of skin (CMS/[facility])       ASSESSMENT: The patient is an 88 year old female admitted from home with son for lethargy and weakness. Pt has hx of dementia, HTN, DM, Recurrent UTI. At baseline, pt goes to adult day care, pivots with assist of 2 (son and caregiver), can assist at times with UB self cares such as Keli for grooming however per chart/son levels of  [FreeTextEntry1] : Pt is a 72 yo M with PMHx of BPH, angina (single episode), who presents with c/o bilateral foot drop, gait imbalance, and right hand tremor. \par \par # Bilateral foot drop, hyperreflexia and fasciculation: findings of mixed UMN/LMN, no significant CNS process appreciated on MRI brain, c/t/l spine. \par - Labs- largely unrevealing except for mild transaminitis and mildly elevated anti-gliadin IgA antibody\par - EMG/NCS pending\par - Referral to Neuromuscular specialist\par - PT referral 3 x /wk\par \par GI referral for glainding IgA positive \par \par \par RTC in 4 mo. assist vary based on cognition. Currently, patient presents with generalized weakness, unsteadiness, increased confusion affecting her abilities to safely complete self cares at baseline. Patient will continue to benefit from skilled OT to maximize functional independence and activity tolerance. Occupational Therapy (OT) orders received due to impairments in ADL and instrumental-ADLS related to weakness, fatigue, cognitive deficits, balance and medical status. The patient is currently functioning at total assist. The patient needs to be at a Keli for grooming level for safe return to prior living situation.     Task Modification: clinical decision making of moderate complexity, minimal to moderate task modification         The patient will benefit from continued skilled OT to address these deficits. The prognosis for goal achievement is fair.    See Flowsheet row data below for session detail and goals.     EDUCATION:  The patient and family member was educated on the role of OT in the Acute care setting. The plan of care and goals were discussed and  Needs reinforcement      RECOMMENDATIONS FOR DISCHARGE:  Recommendations for Discharge: OT: 24 Hour assist;Sub-acute nursing home;Home therapy(pending progress) (09/30/19 0900)    OT Identified Barriers to Discharge: weakness, cognition        PT/OT ADL Equipment for Discharge: continue to assess (09/30/19 0900)    Assistance needed when returning home:   Not discussed at this time due to discharge plan/needs not established.  Will continue to address as hospital stay progresses.     ICU Mobility Assesment (PERME):         PLAN: Continue skilled OT, including the following Treatment Interventions: ADL retraining;Functional transfer training;UE strengthening/ROM;Endurance training;Cognitive reorientation;Patient/Family training;Equipment eval/education;Neuro muscular reeducation;Compensatory technique education (09/30/19 0900)     Treatment Plan for Next Session: bring  aide, dangle, one step grooming tasks, transfers as able                                                           SUBJECTIVE: Patient's Personal Goal: unable to state (09/30/19 0900)   Subjective: Patient in bed, smiling, \"I'm doing fine\"  (09/30/19 0900)  Subjective/Objective Comments: ISA Bass approves/aware of therapy session. Split session this date [id number], [id number] (09/30/19 0900)    OBJECTIVE:Basic Lines: IV;Other (comment)(external female catheter) (09/30/19 0900)  Safety Measures: Alarms (09/30/19 0900)    RN reported Little Fall Scale Score: 50       Last 24 hours of Functional Data     ADLs  Self Cares/ADL's  Grooming Assistance: Moderate Assist (Mod) (09/30/19 0900)  Grooming/Oral Hygiene Deficit: Wash/dry face;Wash/dry hands (09/30/19 0900)  Upper Body Dressing Assistance: Maximal Assist (Max) (09/30/19 0900)  Upper Body Dressing Deficit: Fasteners (09/30/19 0900)  Lower Body Clothing Assistance: Maximal Assist (Max) (09/30/19 0900)  Footwear Assistance: Total Assist - Dependent (09/30/19 0900)  Lower Body Dressing Deficit: Thread RLE into underwear;Thread LLE into underwear;Pull up over hips;Don/doff R shoe;Don/doff L shoe (09/30/19 0900)  Self Cares/ADL's Comments #1: Patient requires max to total assist for dressing and modA for grooming in bed due to weakness and cognition.  (09/30/19 0900)    Household mobility  Household Mobility  Supine to Sit: Total Assist - Dependent(maxA x 2) (09/30/19 0900)  Bed to Chair: Total Assist - Dependent(maxA x 2) (09/30/19 0900)  Sit to Stand: Total Assist - Dependent(maxA x 2 ) (09/30/19 0900)  Stand to Sit: Total Assist - Dependent(maxA x 2) (09/30/19 0900)  Transfer Equipment: gait belt  (09/30/19 0900)  Sitting - Static: Minimal Assist (Min)(initially modA posterior lean progressing to supervision) (09/30/19 0900)  Sitting - Dynamic: Minimal Assist (Min) (09/30/19 0900)  Standing - Static: Total Assist - Dependent (09/30/19 0900)  Standing - Dynamic: Total  Assist - Dependent (09/30/19 0900)  Household Mobility Comments #1: Patient completed bed mobility, sit to stand transfers and bed to chair with maxA x 2 for balance and safety, manual assist for weight shifting and max cueing for sequencing to pivot to bedside chair due to weakness, cognition and posterior lean  (09/30/19 0900)    Other Interventions  Other Interventions 1: Patient and son educated on OT goals and POC,  needs reinforcement  (09/30/19 0900)    Home Management       Tolerance  OT Activity Tolerance  Activity Tolerance: 1:2 Activity to rest (09/30/19 0900)  Vital Signs: Stable (09/30/19 0900)  Activity Tolerance Comments: Poor, limited by cognition (09/30/19 0900)    Cognition  Communication/Cognition  Communication: Delayed responses(few verbalizations) (09/30/19 0900)  Overall Cognitive Status: Impaired (09/30/19 0900)  Arousal/Alertness: Generalized responses (09/30/19 0900)  Attention: Difficulty attending to directions (09/30/19 0900)  Executive Functions: Impaired self monitoring/self awareness;Impaired organization;Impaired planning;Impaired initiation (09/30/19 0900)  Memory: Decreased recall of biographical information;Decreased recall of precautions;Decreased recall of recent events (09/30/19 0900)  Cognition Comments: patient follows ~50% of one step commands with verbal and tactile cueing  (09/30/19 0900)    Patient's Personal Goal: unable to state (09/30/19 0900)    Therapy Goals:    Goals  Short Term Goals to Be Reviewed On: 10/07/19 (09/30/19 0900)  Short Term Goals Are The Same as Discharge Goals: Yes (09/30/19 0900)  Goal Agreement: Patient unable to agree with goals and treatment plan;Will attempt to contact family/significant other/caregiver (09/30/19 0900)  Feeding Discharge Goal 1: self feeding with set up  (09/30/19 0900)  Grooming Discharge Goal 1: one step grooming task with set up  (09/30/19 0900)  Home Setting Transfer Discharge Goal 1: toilet transfers with moderate assist  (09/30/19 0900)    Total Treatment Time:  OT Time Spent: 55 minutes (09/30/19 0900)      See OT flowsheet for full details regarding the OT therapy provided.
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
## 2022-10-12 ENCOUNTER — NON-APPOINTMENT (OUTPATIENT)
Age: 72
End: 2022-10-12

## 2022-10-13 ENCOUNTER — APPOINTMENT (OUTPATIENT)
Dept: NEUROLOGY | Facility: CLINIC | Age: 72
End: 2022-10-13

## 2022-10-13 PROCEDURE — 95886 MUSC TEST DONE W/N TEST COMP: CPT

## 2022-10-13 PROCEDURE — 95913 NRV CNDJ TEST 13/> STUDIES: CPT

## 2022-11-04 ENCOUNTER — APPOINTMENT (OUTPATIENT)
Dept: NEUROLOGY | Facility: CLINIC | Age: 72
End: 2022-11-04

## 2022-11-04 VITALS
WEIGHT: 168 LBS | DIASTOLIC BLOOD PRESSURE: 74 MMHG | BODY MASS INDEX: 26.37 KG/M2 | TEMPERATURE: 97.8 F | OXYGEN SATURATION: 98 % | SYSTOLIC BLOOD PRESSURE: 150 MMHG | HEIGHT: 67 IN | HEART RATE: 75 BPM

## 2022-11-04 PROCEDURE — 99214 OFFICE O/P EST MOD 30 MIN: CPT

## 2022-11-04 RX ORDER — EDARAVONE 105 MG/5ML
105 KIT ORAL
Qty: 1 | Refills: 1 | Status: ACTIVE | COMMUNITY
Start: 2022-11-04 | End: 1900-01-01

## 2022-11-04 RX ORDER — ESOMEPRAZOLE MAGNESIUM 40 MG/1
40 CAPSULE, DELAYED RELEASE ORAL
Qty: 30 | Refills: 0 | Status: ACTIVE | COMMUNITY
Start: 2022-10-20

## 2022-11-07 ENCOUNTER — LABORATORY RESULT (OUTPATIENT)
Age: 72
End: 2022-11-07

## 2022-11-07 NOTE — PHYSICAL EXAM
[FreeTextEntry1] : ALS-FRS 44/48 \par \par Focal neurological exam: \par \par MS: Awake, alert, oriented to person, place, situation and time. Normal affect. Follows commands.  \par \par Language: Speech is clear, fluent with good repetition & comprehension. No dysarthria.  \par \par CNs 2 - 12 intact. EOMI no nystagmus, no diplopia. VFF. No facial asymmetry b/l, full eye closure strength b/l. Hearing grossly normal. Head turning & shoulder shrug intact b/l. Tongue midline, normal movements, no atrophy. \par \par Motor:  \par Neck flexion and extension 5/5\par Head turn 5/5\par Shoulder shrug 5/5\par  \par UE: Prox and Distal  - fasciculations present BL\par Right: Shoulder 5/5 flex/ext 5/5, Elbow flex/ext 5/5,  5/5, IO 5/5 \par Left: Shoulder flex/ext 5/5, Elbow flex/ext 5/5,  5/5, IO weakness 3/5, atrophy in left ABP, clawed 4th finger\par \par LE: Prox and Distal\par Right: Hip flex/ext 5/5, Knee flex/ext 5/5, Ankle ext 1/5  dorsi/plantar flexion 1/5\par Left: Hip flex/ext 5/5, Knee flex/ex 5/5, Ankle flex/ext 1/5   dorsi/plantar flexion 2/5\par \par Stand on tip toes: \par \par Reflexes: \par DTR of Biceps 3+, Triceps 3+, Brachioradialis 3+/5, Patella 3+, Achilles 3+/5\par Pectoralis reflex 2+\par Plantar response--Mute BL\par Jaw jerk present\par Kumari + on Left\par \par \par Sensation: Intact to LT, temperature, pinprick, b/l throughout. \par \par Cortical: No extinction \par \par Coordination: No dysmetria to FTN and HTS\par \par Gait: No postural instability. Normal stance and tandem gait.\par
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
## 2022-11-07 NOTE — END OF VISIT
[] : Resident [FreeTextEntry3] : I visited the patient with resident. Agree with history, physical exam and plan as above. Patient with bilateral drop foot and left hand ulnar weakness and generalized fasciculations and hyperreflexia. No bulbar sign. Family history of ALS. Needs genetic testing. Start Rilutek and insurance approval for Radicava. Check ganglioside antibodies. Labs ordered last visit are still pending. Follow up in 3 months \par

## 2022-11-08 LAB
ALBUMIN SERPL ELPH-MCNC: 5 G/DL
ALP BLD-CCNC: 76 U/L
ALT SERPL-CCNC: 29 U/L
ANION GAP SERPL CALC-SCNC: 12 MMOL/L
AST SERPL-CCNC: 37 U/L
BILIRUB SERPL-MCNC: 0.4 MG/DL
BUN SERPL-MCNC: 18 MG/DL
CALCIUM SERPL-MCNC: 9.6 MG/DL
CHLORIDE SERPL-SCNC: 100 MMOL/L
CO2 SERPL-SCNC: 27 MMOL/L
CREAT SERPL-MCNC: 1.1 MG/DL
EGFR: 72 ML/MIN/1.73M2
FOLATE SERPL-MCNC: 15.3 NG/ML
GLUCOSE SERPL-MCNC: 116 MG/DL
POTASSIUM SERPL-SCNC: 4.4 MMOL/L
PROT SERPL-MCNC: 7.8 G/DL
SMOOTH MUSCLE AB SER QL IF: NORMAL
SODIUM SERPL-SCNC: 139 MMOL/L
TSH SERPL-ACNC: 1.25 UIU/ML
VIT B12 SERPL-MCNC: 655 PG/ML

## 2022-11-09 LAB
ENA SCL70 IGG SER IA-ACNC: <0.2 AL
ENA SS-A AB SER IA-ACNC: <0.2 AL
ENA SS-B AB SER IA-ACNC: <0.2 AL

## 2022-11-15 LAB
ANTI IGA ANTIBODIES: <99 U/ML
ASIALO-GM1 ANTIBODIES, IGG/IGM: 17 IV
DSDNA AB SER-ACNC: 12 IU/ML
GAD65 AB SER-MCNC: 0 NMOL/L
GD1A ANTIBODIES, IGG/IGM: 27 IV
GD1B ANTIBODIES, IGG/IGM: 27 IV
GM1 ANTIBODIES, IGG/IGM: 25 IV
GM2 ANTIBODIES, IGG/IGM: 15 IV
GQ1B ANTIBODIES, IGG/IGM: 11 IV
HTLV I+II AB SER QL: NORMAL
STRIA MUS AB SER QL: NORMAL

## 2022-11-16 LAB — MUSK ANTIBODY TEST: NORMAL

## 2023-01-01 ENCOUNTER — OUTPATIENT (OUTPATIENT)
Dept: OUTPATIENT SERVICES | Facility: HOSPITAL | Age: 73
LOS: 1 days | End: 2023-01-01

## 2023-01-01 ENCOUNTER — OUTPATIENT (OUTPATIENT)
Dept: OUTPATIENT SERVICES | Facility: HOSPITAL | Age: 73
LOS: 1 days | End: 2023-01-01
Payer: MEDICARE

## 2023-01-01 ENCOUNTER — APPOINTMENT (OUTPATIENT)
Dept: ORTHOPEDIC SURGERY | Facility: CLINIC | Age: 73
End: 2023-01-01
Payer: MEDICARE

## 2023-01-01 ENCOUNTER — OUTPATIENT (OUTPATIENT)
Dept: INPATIENT UNIT | Facility: HOSPITAL | Age: 73
LOS: 1 days | Discharge: HOME | End: 2023-01-01
Payer: MEDICARE

## 2023-01-01 ENCOUNTER — APPOINTMENT (OUTPATIENT)
Dept: NEUROPSYCHOLOGY | Facility: CLINIC | Age: 73
End: 2023-01-01

## 2023-01-01 ENCOUNTER — APPOINTMENT (OUTPATIENT)
Dept: PULMONOLOGY | Facility: HOSPITAL | Age: 73
End: 2023-01-01
Payer: MEDICARE

## 2023-01-01 ENCOUNTER — RESULT REVIEW (OUTPATIENT)
Age: 73
End: 2023-01-01

## 2023-01-01 ENCOUNTER — APPOINTMENT (OUTPATIENT)
Dept: NEUROLOGY | Facility: CLINIC | Age: 73
End: 2023-01-01
Payer: MEDICARE

## 2023-01-01 ENCOUNTER — APPOINTMENT (OUTPATIENT)
Dept: OTOLARYNGOLOGY | Facility: CLINIC | Age: 73
End: 2023-01-01

## 2023-01-01 ENCOUNTER — APPOINTMENT (OUTPATIENT)
Dept: OTOLARYNGOLOGY | Facility: CLINIC | Age: 73
End: 2023-01-01
Payer: MEDICARE

## 2023-01-01 ENCOUNTER — APPOINTMENT (OUTPATIENT)
Dept: OTOLARYNGOLOGY | Facility: CLINIC | Age: 73
End: 2023-01-01
Payer: SELF-PAY

## 2023-01-01 ENCOUNTER — OUTPATIENT (OUTPATIENT)
Dept: OUTPATIENT SERVICES | Facility: HOSPITAL | Age: 73
LOS: 1 days | Discharge: HOME | End: 2023-01-01
Payer: MEDICARE

## 2023-01-01 ENCOUNTER — APPOINTMENT (OUTPATIENT)
Dept: PULMONOLOGY | Facility: CLINIC | Age: 73
End: 2023-01-01
Payer: MEDICARE

## 2023-01-01 ENCOUNTER — APPOINTMENT (OUTPATIENT)
Dept: PHARMACY | Facility: CLINIC | Age: 73
End: 2023-01-01

## 2023-01-01 ENCOUNTER — APPOINTMENT (OUTPATIENT)
Dept: SLEEP CENTER | Facility: HOSPITAL | Age: 73
End: 2023-01-01
Payer: MEDICARE

## 2023-01-01 ENCOUNTER — TRANSCRIPTION ENCOUNTER (OUTPATIENT)
Age: 73
End: 2023-01-01

## 2023-01-01 ENCOUNTER — APPOINTMENT (OUTPATIENT)
Dept: CARDIOTHORACIC SURGERY | Facility: CLINIC | Age: 73
End: 2023-01-01
Payer: MEDICARE

## 2023-01-01 ENCOUNTER — APPOINTMENT (OUTPATIENT)
Dept: ORTHOPEDIC SURGERY | Facility: AMBULATORY SURGERY CENTER | Age: 73
End: 2023-01-01

## 2023-01-01 ENCOUNTER — LABORATORY RESULT (OUTPATIENT)
Age: 73
End: 2023-01-01

## 2023-01-01 ENCOUNTER — APPOINTMENT (OUTPATIENT)
Dept: NEUROLOGY | Facility: CLINIC | Age: 73
End: 2023-01-01

## 2023-01-01 ENCOUNTER — NON-APPOINTMENT (OUTPATIENT)
Age: 73
End: 2023-01-01

## 2023-01-01 VITALS
SYSTOLIC BLOOD PRESSURE: 148 MMHG | HEART RATE: 95 BPM | OXYGEN SATURATION: 96 % | TEMPERATURE: 98.4 F | WEIGHT: 145 LBS | HEIGHT: 67 IN | DIASTOLIC BLOOD PRESSURE: 80 MMHG | BODY MASS INDEX: 22.76 KG/M2

## 2023-01-01 VITALS
RESPIRATION RATE: 20 BRPM | HEART RATE: 64 BPM | DIASTOLIC BLOOD PRESSURE: 76 MMHG | TEMPERATURE: 98 F | WEIGHT: 166.01 LBS | HEIGHT: 67 IN | OXYGEN SATURATION: 98 % | SYSTOLIC BLOOD PRESSURE: 163 MMHG

## 2023-01-01 VITALS
DIASTOLIC BLOOD PRESSURE: 70 MMHG | OXYGEN SATURATION: 96 % | RESPIRATION RATE: 24 BRPM | TEMPERATURE: 97 F | HEART RATE: 65 BPM | SYSTOLIC BLOOD PRESSURE: 114 MMHG

## 2023-01-01 VITALS
OXYGEN SATURATION: 96 % | HEART RATE: 69 BPM | HEIGHT: 67 IN | BODY MASS INDEX: 25.74 KG/M2 | TEMPERATURE: 98.4 F | WEIGHT: 164 LBS | DIASTOLIC BLOOD PRESSURE: 72 MMHG | SYSTOLIC BLOOD PRESSURE: 133 MMHG

## 2023-01-01 VITALS
WEIGHT: 166 LBS | BODY MASS INDEX: 26.06 KG/M2 | RESPIRATION RATE: 14 BRPM | OXYGEN SATURATION: 96 % | DIASTOLIC BLOOD PRESSURE: 74 MMHG | HEIGHT: 67 IN | HEART RATE: 80 BPM | SYSTOLIC BLOOD PRESSURE: 142 MMHG

## 2023-01-01 VITALS
WEIGHT: 166.01 LBS | HEART RATE: 92 BPM | OXYGEN SATURATION: 96 % | HEIGHT: 67 IN | RESPIRATION RATE: 20 BRPM | TEMPERATURE: 98 F | DIASTOLIC BLOOD PRESSURE: 80 MMHG | SYSTOLIC BLOOD PRESSURE: 150 MMHG

## 2023-01-01 VITALS — BODY MASS INDEX: 26.06 KG/M2 | WEIGHT: 166 LBS | HEIGHT: 67 IN

## 2023-01-01 VITALS
BODY MASS INDEX: 25.74 KG/M2 | HEART RATE: 93 BPM | OXYGEN SATURATION: 100 % | TEMPERATURE: 97.9 F | HEIGHT: 67 IN | DIASTOLIC BLOOD PRESSURE: 67 MMHG | SYSTOLIC BLOOD PRESSURE: 131 MMHG | WEIGHT: 164 LBS

## 2023-01-01 VITALS — WEIGHT: 166 LBS | BODY MASS INDEX: 26.06 KG/M2 | HEIGHT: 67 IN

## 2023-01-01 VITALS
RESPIRATION RATE: 14 BRPM | HEIGHT: 67 IN | BODY MASS INDEX: 25.74 KG/M2 | SYSTOLIC BLOOD PRESSURE: 124 MMHG | OXYGEN SATURATION: 96 % | WEIGHT: 164 LBS | DIASTOLIC BLOOD PRESSURE: 70 MMHG | HEART RATE: 77 BPM

## 2023-01-01 VITALS — BODY MASS INDEX: 26.06 KG/M2 | HEIGHT: 67 IN | WEIGHT: 166 LBS

## 2023-01-01 DIAGNOSIS — Z96.659 PRESENCE OF UNSPECIFIED ARTIFICIAL KNEE JOINT: Chronic | ICD-10-CM

## 2023-01-01 DIAGNOSIS — G12.21 AMYOTROPHIC LATERAL SCLEROSIS: ICD-10-CM

## 2023-01-01 DIAGNOSIS — Z98.890 OTHER SPECIFIED POSTPROCEDURAL STATES: Chronic | ICD-10-CM

## 2023-01-01 DIAGNOSIS — Z00.00 ENCOUNTER FOR GENERAL ADULT MEDICAL EXAMINATION WITHOUT ABNORMAL FINDINGS: ICD-10-CM

## 2023-01-01 DIAGNOSIS — S69.92XA UNSPECIFIED INJURY OF LEFT WRIST, HAND AND FINGER(S), INITIAL ENCOUNTER: Chronic | ICD-10-CM

## 2023-01-01 DIAGNOSIS — H61.22 IMPACTED CERUMEN, LEFT EAR: ICD-10-CM

## 2023-01-01 DIAGNOSIS — R63.0 ANOREXIA: ICD-10-CM

## 2023-01-01 DIAGNOSIS — N20.0 CALCULUS OF KIDNEY: ICD-10-CM

## 2023-01-01 DIAGNOSIS — G47.33 OBSTRUCTIVE SLEEP APNEA (ADULT) (PEDIATRIC): ICD-10-CM

## 2023-01-01 DIAGNOSIS — G47.31 PRIMARY CENTRAL SLEEP APNEA: ICD-10-CM

## 2023-01-01 DIAGNOSIS — F17.201 NICOTINE DEPENDENCE, UNSPECIFIED, IN REMISSION: ICD-10-CM

## 2023-01-01 DIAGNOSIS — M21.372 FOOT DROP, RIGHT FOOT: ICD-10-CM

## 2023-01-01 DIAGNOSIS — G57.61 LESION OF PLANTAR NERVE, RIGHT LOWER LIMB: ICD-10-CM

## 2023-01-01 DIAGNOSIS — Z87.891 PERSONAL HISTORY OF NICOTINE DEPENDENCE: ICD-10-CM

## 2023-01-01 DIAGNOSIS — Z01.818 ENCOUNTER FOR OTHER PREPROCEDURAL EXAMINATION: ICD-10-CM

## 2023-01-01 DIAGNOSIS — Z00.8 ENCOUNTER FOR OTHER GENERAL EXAMINATION: ICD-10-CM

## 2023-01-01 DIAGNOSIS — R13.10 DYSPHAGIA, UNSPECIFIED: ICD-10-CM

## 2023-01-01 DIAGNOSIS — G12.20 MOTOR NEURON DISEASE, UNSPECIFIED: ICD-10-CM

## 2023-01-01 DIAGNOSIS — M21.371 FOOT DROP, RIGHT FOOT: ICD-10-CM

## 2023-01-01 DIAGNOSIS — M25.519 PAIN IN UNSPECIFIED SHOULDER: ICD-10-CM

## 2023-01-01 DIAGNOSIS — H93.8X3 OTHER SPECIFIED DISORDERS OF EAR, BILATERAL: ICD-10-CM

## 2023-01-01 DIAGNOSIS — E78.00 PURE HYPERCHOLESTEROLEMIA, UNSPECIFIED: ICD-10-CM

## 2023-01-01 DIAGNOSIS — R25.2 CRAMP AND SPASM: ICD-10-CM

## 2023-01-01 DIAGNOSIS — K21.9 GASTRO-ESOPHAGEAL REFLUX DISEASE WITHOUT ESOPHAGITIS: ICD-10-CM

## 2023-01-01 DIAGNOSIS — Z82.0 FAMILY HISTORY OF EPILEPSY AND OTHER DISEASES OF THE NERVOUS SYSTEM: ICD-10-CM

## 2023-01-01 LAB
ALBUMIN SERPL ELPH-MCNC: 4.6 G/DL — SIGNIFICANT CHANGE UP (ref 3.5–5.2)
ALBUMIN SERPL ELPH-MCNC: 4.8 G/DL
ALP BLD-CCNC: 73 U/L
ALP SERPL-CCNC: 81 U/L — SIGNIFICANT CHANGE UP (ref 30–115)
ALT FLD-CCNC: 32 U/L — SIGNIFICANT CHANGE UP (ref 0–41)
ALT SERPL-CCNC: 39 U/L
ANION GAP SERPL CALC-SCNC: 13 MMOL/L
ANION GAP SERPL CALC-SCNC: 14 MMOL/L — SIGNIFICANT CHANGE UP (ref 7–14)
APTT BLD: 40.9 SEC — HIGH (ref 27–39.2)
AST SERPL-CCNC: 41 U/L — SIGNIFICANT CHANGE UP (ref 0–41)
AST SERPL-CCNC: 51 U/L
BASOPHILS # BLD AUTO: 0.14 K/UL — SIGNIFICANT CHANGE UP (ref 0–0.2)
BASOPHILS NFR BLD AUTO: 2.6 % — HIGH (ref 0–1)
BILIRUB SERPL-MCNC: 0.5 MG/DL
BILIRUB SERPL-MCNC: 0.5 MG/DL — SIGNIFICANT CHANGE UP (ref 0.2–1.2)
BUN SERPL-MCNC: 14 MG/DL — SIGNIFICANT CHANGE UP (ref 10–20)
BUN SERPL-MCNC: 16 MG/DL
CALCIUM SERPL-MCNC: 9.3 MG/DL
CALCIUM SERPL-MCNC: 9.7 MG/DL — SIGNIFICANT CHANGE UP (ref 8.4–10.5)
CHLORIDE SERPL-SCNC: 101 MMOL/L
CHLORIDE SERPL-SCNC: 101 MMOL/L — SIGNIFICANT CHANGE UP (ref 98–110)
CO2 SERPL-SCNC: 25 MMOL/L — SIGNIFICANT CHANGE UP (ref 17–32)
CO2 SERPL-SCNC: 26 MMOL/L
CREAT SERPL-MCNC: 0.9 MG/DL
CREAT SERPL-MCNC: 0.9 MG/DL — SIGNIFICANT CHANGE UP (ref 0.7–1.5)
EGFR: 91 ML/MIN/1.73M2
EGFR: 91 ML/MIN/1.73M2 — SIGNIFICANT CHANGE UP
EOSINOPHIL # BLD AUTO: 0.16 K/UL — SIGNIFICANT CHANGE UP (ref 0–0.7)
EOSINOPHIL NFR BLD AUTO: 2.9 % — SIGNIFICANT CHANGE UP (ref 0–8)
GLUCOSE SERPL-MCNC: 89 MG/DL
GLUCOSE SERPL-MCNC: 89 MG/DL — SIGNIFICANT CHANGE UP (ref 70–99)
HCT VFR BLD CALC: 44.2 % — SIGNIFICANT CHANGE UP (ref 42–52)
HGB BLD-MCNC: 15.2 G/DL — SIGNIFICANT CHANGE UP (ref 14–18)
IMM GRANULOCYTES NFR BLD AUTO: 0.2 % — SIGNIFICANT CHANGE UP (ref 0.1–0.3)
INR BLD: 1.08 RATIO — SIGNIFICANT CHANGE UP (ref 0.65–1.3)
LYMPHOCYTES # BLD AUTO: 1.47 K/UL — SIGNIFICANT CHANGE UP (ref 1.2–3.4)
LYMPHOCYTES # BLD AUTO: 27 % — SIGNIFICANT CHANGE UP (ref 20.5–51.1)
MCHC RBC-ENTMCNC: 28 PG — SIGNIFICANT CHANGE UP (ref 27–31)
MCHC RBC-ENTMCNC: 34.4 G/DL — SIGNIFICANT CHANGE UP (ref 32–37)
MCV RBC AUTO: 81.4 FL — SIGNIFICANT CHANGE UP (ref 80–94)
MONOCYTES # BLD AUTO: 0.81 K/UL — HIGH (ref 0.1–0.6)
MONOCYTES NFR BLD AUTO: 14.9 % — HIGH (ref 1.7–9.3)
NEUTROPHILS # BLD AUTO: 2.86 K/UL — SIGNIFICANT CHANGE UP (ref 1.4–6.5)
NEUTROPHILS NFR BLD AUTO: 52.4 % — SIGNIFICANT CHANGE UP (ref 42.2–75.2)
NRBC # BLD: 0 /100 WBCS — SIGNIFICANT CHANGE UP (ref 0–0)
PLATELET # BLD AUTO: 210 K/UL — SIGNIFICANT CHANGE UP (ref 130–400)
PMV BLD: 12.2 FL — HIGH (ref 7.4–10.4)
POTASSIUM SERPL-MCNC: 4.3 MMOL/L — SIGNIFICANT CHANGE UP (ref 3.5–5)
POTASSIUM SERPL-SCNC: 4.3 MMOL/L
POTASSIUM SERPL-SCNC: 4.3 MMOL/L — SIGNIFICANT CHANGE UP (ref 3.5–5)
PROT SERPL-MCNC: 7 G/DL — SIGNIFICANT CHANGE UP (ref 6–8)
PROT SERPL-MCNC: 7.2 G/DL
PROTHROM AB SERPL-ACNC: 12.3 SEC — SIGNIFICANT CHANGE UP (ref 9.95–12.87)
RBC # BLD: 5.43 M/UL — SIGNIFICANT CHANGE UP (ref 4.7–6.1)
RBC # FLD: 14.9 % — HIGH (ref 11.5–14.5)
SODIUM SERPL-SCNC: 140 MMOL/L
SODIUM SERPL-SCNC: 140 MMOL/L — SIGNIFICANT CHANGE UP (ref 135–146)
WBC # BLD: 5.45 K/UL — SIGNIFICANT CHANGE UP (ref 4.8–10.8)
WBC # FLD AUTO: 5.45 K/UL — SIGNIFICANT CHANGE UP (ref 4.8–10.8)

## 2023-01-01 PROCEDURE — 97110 THERAPEUTIC EXERCISES: CPT | Mod: GP

## 2023-01-01 PROCEDURE — 94726 PLETHYSMOGRAPHY LUNG VOLUMES: CPT

## 2023-01-01 PROCEDURE — 95992 CANALITH REPOSITIONING PROC: CPT

## 2023-01-01 PROCEDURE — 92550 TYMPANOMETRY & REFLEX THRESH: CPT

## 2023-01-01 PROCEDURE — 97116 GAIT TRAINING THERAPY: CPT | Mod: GP

## 2023-01-01 PROCEDURE — 94726 PLETHYSMOGRAPHY LUNG VOLUMES: CPT | Mod: 26

## 2023-01-01 PROCEDURE — 99024 POSTOP FOLLOW-UP VISIT: CPT

## 2023-01-01 PROCEDURE — 99214 OFFICE O/P EST MOD 30 MIN: CPT

## 2023-01-01 PROCEDURE — V5299A: CUSTOM | Mod: NC

## 2023-01-01 PROCEDURE — 73718 MRI LOWER EXTREMITY W/O DYE: CPT | Mod: RT

## 2023-01-01 PROCEDURE — 92557 COMPREHENSIVE HEARING TEST: CPT

## 2023-01-01 PROCEDURE — 92522 EVALUATE SPEECH PRODUCTION: CPT | Mod: GN

## 2023-01-01 PROCEDURE — 93010 ELECTROCARDIOGRAM REPORT: CPT

## 2023-01-01 PROCEDURE — 99215 OFFICE O/P EST HI 40 MIN: CPT

## 2023-01-01 PROCEDURE — 76775 US EXAM ABDO BACK WALL LIM: CPT

## 2023-01-01 PROCEDURE — 97110 THERAPEUTIC EXERCISES: CPT | Mod: KX,GP

## 2023-01-01 PROCEDURE — 85730 THROMBOPLASTIN TIME PARTIAL: CPT

## 2023-01-01 PROCEDURE — 36415 COLL VENOUS BLD VENIPUNCTURE: CPT

## 2023-01-01 PROCEDURE — 95800 SLP STDY UNATTENDED: CPT | Mod: 26

## 2023-01-01 PROCEDURE — G0296 VISIT TO DETERM LDCT ELIG: CPT

## 2023-01-01 PROCEDURE — 28080 REMOVAL OF FOOT LESION: CPT | Mod: T7

## 2023-01-01 PROCEDURE — 31231 NASAL ENDOSCOPY DX: CPT

## 2023-01-01 PROCEDURE — 94729 DIFFUSING CAPACITY: CPT

## 2023-01-01 PROCEDURE — 73030 X-RAY EXAM OF SHOULDER: CPT | Mod: 26,50

## 2023-01-01 PROCEDURE — 99214 OFFICE O/P EST MOD 30 MIN: CPT | Mod: 25

## 2023-01-01 PROCEDURE — 99204 OFFICE O/P NEW MOD 45 MIN: CPT

## 2023-01-01 PROCEDURE — 92611 MOTION FLUOROSCOPY/SWALLOW: CPT | Mod: GN

## 2023-01-01 PROCEDURE — 74230 X-RAY XM SWLNG FUNCJ C+: CPT

## 2023-01-01 PROCEDURE — 80053 COMPREHEN METABOLIC PANEL: CPT

## 2023-01-01 PROCEDURE — 94060 EVALUATION OF WHEEZING: CPT | Mod: 26

## 2023-01-01 PROCEDURE — 93005 ELECTROCARDIOGRAM TRACING: CPT

## 2023-01-01 PROCEDURE — V5299A: CUSTOM

## 2023-01-01 PROCEDURE — 88304 TISSUE EXAM BY PATHOLOGIST: CPT

## 2023-01-01 PROCEDURE — 95806 SLEEP STUDY UNATT&RESP EFFT: CPT

## 2023-01-01 PROCEDURE — 99213 OFFICE O/P EST LOW 20 MIN: CPT

## 2023-01-01 PROCEDURE — 85025 COMPLETE CBC W/AUTO DIFF WBC: CPT

## 2023-01-01 PROCEDURE — 71271 CT THORAX LUNG CANCER SCR C-: CPT

## 2023-01-01 PROCEDURE — 88304 TISSUE EXAM BY PATHOLOGIST: CPT | Mod: 26

## 2023-01-01 PROCEDURE — 99204 OFFICE O/P NEW MOD 45 MIN: CPT | Mod: 25,GC

## 2023-01-01 PROCEDURE — 71271 CT THORAX LUNG CANCER SCR C-: CPT | Mod: 26

## 2023-01-01 PROCEDURE — 94729 DIFFUSING CAPACITY: CPT | Mod: 26

## 2023-01-01 PROCEDURE — 73030 X-RAY EXAM OF SHOULDER: CPT | Mod: 50

## 2023-01-01 PROCEDURE — 74230 X-RAY XM SWLNG FUNCJ C+: CPT | Mod: 26

## 2023-01-01 PROCEDURE — 69210 REMOVE IMPACTED EAR WAX UNI: CPT

## 2023-01-01 PROCEDURE — 94070 EVALUATION OF WHEEZING: CPT

## 2023-01-01 PROCEDURE — 73718 MRI LOWER EXTREMITY W/O DYE: CPT | Mod: 26,RT,MH

## 2023-01-01 PROCEDURE — V5261F: CUSTOM | Mod: GY

## 2023-01-01 PROCEDURE — 76775 US EXAM ABDO BACK WALL LIM: CPT | Mod: 26

## 2023-01-01 PROCEDURE — 85610 PROTHROMBIN TIME: CPT

## 2023-01-01 RX ORDER — RILUZOLE 50 MG/1
1 TABLET ORAL
Refills: 0 | DISCHARGE

## 2023-01-01 RX ORDER — CEPHALEXIN 500 MG/1
500 CAPSULE ORAL 4 TIMES DAILY
Qty: 20 | Refills: 0 | Status: DISCONTINUED | COMMUNITY
Start: 2023-01-01 | End: 2023-01-01

## 2023-01-01 RX ORDER — ASPIRIN/CALCIUM CARB/MAGNESIUM 324 MG
0 TABLET ORAL
Qty: 0 | Refills: 0 | DISCHARGE

## 2023-01-01 RX ORDER — EDARAVONE 105 MG/5ML
5 KIT ORAL
Refills: 0 | DISCHARGE

## 2023-01-01 RX ORDER — BACLOFEN 5 MG/1
5 TABLET ORAL DAILY
Qty: 30 | Refills: 2 | Status: DISCONTINUED | COMMUNITY
Start: 2023-03-03 | End: 2023-01-01

## 2023-01-01 RX ORDER — SODIUM CHLORIDE 9 MG/ML
1000 INJECTION, SOLUTION INTRAVENOUS
Refills: 0 | Status: DISCONTINUED | OUTPATIENT
Start: 2023-01-01 | End: 2023-01-01

## 2023-01-01 RX ORDER — ASPIRIN ENTERIC COATED TABLETS 81 MG 81 MG/1
81 TABLET, DELAYED RELEASE ORAL DAILY
Refills: 0 | Status: DISCONTINUED | COMMUNITY
End: 2023-01-01

## 2023-01-01 RX ORDER — GABAPENTIN 300 MG/1
300 CAPSULE ORAL 3 TIMES DAILY
Qty: 180 | Refills: 1 | Status: DISCONTINUED | COMMUNITY
Start: 2023-03-03 | End: 2023-01-01

## 2023-01-01 RX ORDER — FOLIC ACID 20 MG
CAPSULE ORAL
Refills: 0 | Status: DISCONTINUED | COMMUNITY
End: 2023-01-01

## 2023-01-01 RX ORDER — RILUZOLE 50 MG/1
50 TABLET, FILM COATED ORAL
Qty: 120 | Refills: 5 | Status: ACTIVE | COMMUNITY
Start: 2022-11-04 | End: 1900-01-01

## 2023-01-01 RX ORDER — ACETAMINOPHEN 500 MG
1000 TABLET ORAL ONCE
Refills: 0 | Status: DISCONTINUED | OUTPATIENT
Start: 2023-01-01 | End: 2023-01-01

## 2023-01-01 RX ORDER — ONDANSETRON 8 MG/1
4 TABLET, FILM COATED ORAL ONCE
Refills: 0 | Status: DISCONTINUED | OUTPATIENT
Start: 2023-01-01 | End: 2023-01-01

## 2023-01-01 RX ORDER — TADALAFIL 5 MG/1
TABLET, FILM COATED ORAL
Refills: 0 | Status: ACTIVE | COMMUNITY

## 2023-01-01 RX ORDER — CHROMIUM 200 MCG
TABLET ORAL
Refills: 0 | Status: DISCONTINUED | COMMUNITY
End: 2023-01-01

## 2023-01-01 RX ORDER — MIRABEGRON 25 MG/1
25 TABLET, FILM COATED, EXTENDED RELEASE ORAL
Refills: 0 | Status: ACTIVE | COMMUNITY

## 2023-01-01 RX ORDER — SODIUM PHENYLBUTYRATE/TAURURSODIOL 3; 1 G/1; G/1
3-1 POWDER, FOR SUSPENSION ORAL
Qty: 56 | Refills: 11 | Status: ACTIVE | COMMUNITY
Start: 2023-01-01 | End: 1900-01-01

## 2023-01-25 ENCOUNTER — OUTPATIENT (OUTPATIENT)
Dept: OUTPATIENT SERVICES | Facility: HOSPITAL | Age: 73
LOS: 1 days | End: 2023-01-25

## 2023-01-25 DIAGNOSIS — Z96.659 PRESENCE OF UNSPECIFIED ARTIFICIAL KNEE JOINT: Chronic | ICD-10-CM

## 2023-01-25 DIAGNOSIS — G62.9 POLYNEUROPATHY, UNSPECIFIED: ICD-10-CM

## 2023-01-25 DIAGNOSIS — S69.92XA UNSPECIFIED INJURY OF LEFT WRIST, HAND AND FINGER(S), INITIAL ENCOUNTER: Chronic | ICD-10-CM

## 2023-01-25 DIAGNOSIS — R26.9 UNSPECIFIED ABNORMALITIES OF GAIT AND MOBILITY: ICD-10-CM

## 2023-02-01 ENCOUNTER — OUTPATIENT (OUTPATIENT)
Dept: OUTPATIENT SERVICES | Facility: HOSPITAL | Age: 73
LOS: 1 days | Discharge: HOME | End: 2023-02-01

## 2023-02-01 DIAGNOSIS — G62.9 POLYNEUROPATHY, UNSPECIFIED: ICD-10-CM

## 2023-02-01 DIAGNOSIS — Z96.659 PRESENCE OF UNSPECIFIED ARTIFICIAL KNEE JOINT: Chronic | ICD-10-CM

## 2023-02-01 DIAGNOSIS — S69.92XA UNSPECIFIED INJURY OF LEFT WRIST, HAND AND FINGER(S), INITIAL ENCOUNTER: Chronic | ICD-10-CM

## 2023-02-01 DIAGNOSIS — R26.9 UNSPECIFIED ABNORMALITIES OF GAIT AND MOBILITY: ICD-10-CM

## 2023-02-08 ENCOUNTER — OUTPATIENT (OUTPATIENT)
Dept: OUTPATIENT SERVICES | Facility: HOSPITAL | Age: 73
LOS: 1 days | End: 2023-02-08
Payer: MEDICARE

## 2023-02-08 DIAGNOSIS — Z96.659 PRESENCE OF UNSPECIFIED ARTIFICIAL KNEE JOINT: Chronic | ICD-10-CM

## 2023-02-08 DIAGNOSIS — Z00.00 ENCOUNTER FOR GENERAL ADULT MEDICAL EXAMINATION WITHOUT ABNORMAL FINDINGS: ICD-10-CM

## 2023-02-08 DIAGNOSIS — S69.92XA UNSPECIFIED INJURY OF LEFT WRIST, HAND AND FINGER(S), INITIAL ENCOUNTER: Chronic | ICD-10-CM

## 2023-02-08 PROCEDURE — 97110 THERAPEUTIC EXERCISES: CPT | Mod: GP

## 2023-02-09 DIAGNOSIS — Z00.00 ENCOUNTER FOR GENERAL ADULT MEDICAL EXAMINATION WITHOUT ABNORMAL FINDINGS: ICD-10-CM

## 2023-02-10 ENCOUNTER — OUTPATIENT (OUTPATIENT)
Dept: OUTPATIENT SERVICES | Facility: HOSPITAL | Age: 73
LOS: 1 days | End: 2023-02-10
Payer: MEDICARE

## 2023-02-10 ENCOUNTER — RESULT REVIEW (OUTPATIENT)
Age: 73
End: 2023-02-10

## 2023-02-10 DIAGNOSIS — M79.671 PAIN IN RIGHT FOOT: ICD-10-CM

## 2023-02-10 DIAGNOSIS — S69.92XA UNSPECIFIED INJURY OF LEFT WRIST, HAND AND FINGER(S), INITIAL ENCOUNTER: Chronic | ICD-10-CM

## 2023-02-10 DIAGNOSIS — Z96.659 PRESENCE OF UNSPECIFIED ARTIFICIAL KNEE JOINT: Chronic | ICD-10-CM

## 2023-02-10 PROCEDURE — 73630 X-RAY EXAM OF FOOT: CPT | Mod: 26,RT

## 2023-02-10 PROCEDURE — 73630 X-RAY EXAM OF FOOT: CPT | Mod: RT

## 2023-02-11 DIAGNOSIS — M79.671 PAIN IN RIGHT FOOT: ICD-10-CM

## 2023-02-15 ENCOUNTER — OUTPATIENT (OUTPATIENT)
Dept: OUTPATIENT SERVICES | Facility: HOSPITAL | Age: 73
LOS: 1 days | End: 2023-02-15

## 2023-02-15 DIAGNOSIS — Z00.00 ENCOUNTER FOR GENERAL ADULT MEDICAL EXAMINATION WITHOUT ABNORMAL FINDINGS: ICD-10-CM

## 2023-02-15 DIAGNOSIS — S69.92XA UNSPECIFIED INJURY OF LEFT WRIST, HAND AND FINGER(S), INITIAL ENCOUNTER: Chronic | ICD-10-CM

## 2023-02-15 DIAGNOSIS — Z96.659 PRESENCE OF UNSPECIFIED ARTIFICIAL KNEE JOINT: Chronic | ICD-10-CM

## 2023-02-27 ENCOUNTER — OUTPATIENT (OUTPATIENT)
Dept: OUTPATIENT SERVICES | Facility: HOSPITAL | Age: 73
LOS: 1 days | End: 2023-02-27

## 2023-02-27 DIAGNOSIS — Z00.00 ENCOUNTER FOR GENERAL ADULT MEDICAL EXAMINATION WITHOUT ABNORMAL FINDINGS: ICD-10-CM

## 2023-02-27 DIAGNOSIS — S69.92XA UNSPECIFIED INJURY OF LEFT WRIST, HAND AND FINGER(S), INITIAL ENCOUNTER: Chronic | ICD-10-CM

## 2023-02-27 DIAGNOSIS — Z96.659 PRESENCE OF UNSPECIFIED ARTIFICIAL KNEE JOINT: Chronic | ICD-10-CM

## 2023-03-01 ENCOUNTER — OUTPATIENT (OUTPATIENT)
Dept: OUTPATIENT SERVICES | Facility: HOSPITAL | Age: 73
LOS: 1 days | End: 2023-03-01
Payer: MEDICARE

## 2023-03-01 DIAGNOSIS — Z00.00 ENCOUNTER FOR GENERAL ADULT MEDICAL EXAMINATION WITHOUT ABNORMAL FINDINGS: ICD-10-CM

## 2023-03-01 DIAGNOSIS — Z96.659 PRESENCE OF UNSPECIFIED ARTIFICIAL KNEE JOINT: Chronic | ICD-10-CM

## 2023-03-01 DIAGNOSIS — S69.92XA UNSPECIFIED INJURY OF LEFT WRIST, HAND AND FINGER(S), INITIAL ENCOUNTER: Chronic | ICD-10-CM

## 2023-03-01 PROCEDURE — 97110 THERAPEUTIC EXERCISES: CPT | Mod: GP

## 2023-03-03 ENCOUNTER — APPOINTMENT (OUTPATIENT)
Dept: NEUROLOGY | Facility: CLINIC | Age: 73
End: 2023-03-03
Payer: MEDICARE

## 2023-03-03 VITALS
HEART RATE: 64 BPM | TEMPERATURE: 98.4 F | WEIGHT: 166 LBS | BODY MASS INDEX: 26.06 KG/M2 | OXYGEN SATURATION: 97 % | HEIGHT: 67 IN | DIASTOLIC BLOOD PRESSURE: 77 MMHG | SYSTOLIC BLOOD PRESSURE: 163 MMHG

## 2023-03-03 PROCEDURE — 99215 OFFICE O/P EST HI 40 MIN: CPT

## 2023-03-03 NOTE — HISTORY OF PRESENT ILLNESS
[FreeTextEntry1] : Patient is 70 yo LH man with positive genetic w/u for [id number] (heterozygous), FMHx of ALS (uncle and cousin, currently none in 1st degree relative, has son in early 30s), and PMHx of esophageal spasm w/ LOC spells, accident in 2021 w/ fx of all LH fingers, who was initially seen by Dr. Cates for c/o of b/l foot drop, gait imbalance, and RH tremor in 5/2022. 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 He's w/ prominent fasciculations throughout but also conduction blocks suggestive of MND. Other BW to look for mnd mimics were neg. He was started on Radicava and Riluzole.  \par --------------------------------------- \par -Wife's name Antonieta \par -SHx: Was  (, retired in 2020)/did [employer] work \par -Had paralysis as a child at age 6, RLE palsy post tonsil surgery (cut nerve on R c-spine), had to be left handed \par -Per wife, NMD sx ?started about 1.5 years age (2021?)\par --------------------------------------- \par -Primary Concerns:  \par 1) Legs are heavy \par 2) More expressive and receptive aphasia as the day progresses. \par -Speech: Expressive/receptive aphasia at the end of day. Is well in the AM, but by 2pm he's very tired. Sleeps at 6pm, Denies tongue movement issues  \par -Swallowing/Nutrition/Weight: Denies choking on liquids or food, no more episodes of LOC; Appetite is good, but eating slower. Weight decreased from 185 to 166 despite adequate food intake; denies swallowing issues\par -Breathing: Denies SOB with exertion/ADLs\par -Sialorrhea: NO \par -Pseudobulbar affect/Mood: More flat compared to before per wife; sadness/anger at times\par -Pain: Cramping improved, suraj while on Radicava \par -Mobility: Progressive balance issues, needs to update AFO braces b/l, more trouble climbing stairs; hands cramp after exertion\par -ADLs: Nml, can tie own shoes, dress, shower \par -Sleep: Sleeps well  \par -Medication Tolerance: No side effects on Radicava and Riluzole. While getting meds having less cramps; Couldn't tolerate Baclofen 5 BID PRN bc made legs weaker; CBD very helpful for muscle pain\par -Equipment Needs: Has 2 story house. Can get shower chair, but Shower Bench cannot be approved bc luxury item; uses cane at night  \par -Coping/Social Support: Sufficient  \par -Palliative Care: n/a \par

## 2023-03-03 NOTE — ASSESSMENT
[FreeTextEntry1] : Patient is 72 yo LH man with positive genetic w/u for [id number] (heterozygous), FMHx of ALS (uncle and cousin, currently none in 1st degree relative, has son in early 30s) who likely has familial type of ALS given clinical/EMG findings and negative BW for mimics. Generally, he is stable, ALS-FRS from 44>41, but he has weight loss despite adequate food intake and c/o increase LE weakness. His LH is weaker than RH. NIF is -60, but he c/o fatigue mid-day, MOCA 17/30. 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

## 2023-03-03 NOTE — PHYSICAL EXAM
[FreeTextEntry1] : ALS-FRS 44/48 > 41/48\par \par MS: Awake, alert, oriented to person, place, situation and time. Normal affect. Follows commands.  \par \par Language: Speech is clear, fluent with good repetition & comprehension. No dysarthria.  \par \par CNs 2 - 12 intact. EOMI no nystagmus, no diplopia. VFF. R facial/R cheek weakness; full eye closure strength b/l. Hearing grossly normal. Head turning & shoulder shrug intact b/l. Tongue midline, normal movements, no atrophy.  \par \par Motor: \par Neck flexion and extension 5/5 \par Head turn 5/5 \par Shoulder shrug 5/5 \par \par UE: Prox and Distal - fasciculations present BL \par Right: Shoulder 5/5 flex/ext 5/5, Elbow flex/ext 5/5,  IO 4/5  \par Left: Shoulder flex/ext 5/5, Elbow flex/ext 5/5,  5/5, IO weakness 3/5, atrophy in left ABP, clawed 4th finger, finger/wrist flex 5/5, finger ext 4/5, wrist ext 5/5, deltoid/triceps flex 5/5\par \par LE: Prox and Distal \par Right: Hip flex/ext 5/5, Knee flex/ext 5/5, dorsi/plantar flexion 0/4, eversion 4 \par Left: Hip flex/ext 5/5, Knee flex/ex 5/5, Dorsi/plantar flexion 1/4 \par \par Stand on tip toes:  \par Reflexes:  \par DTR of Biceps 3+, Triceps 3+, Brachioradialis 3+/5, Patella 3+, Achilles 3+/5 \par Pectoralis reflex 2+ \par Plantar response---Clonus\par Jaw jerk present \par Kumari + on Left \par \par Sensation: Intact to LT, temperature, pinprick, b/l throughout.  \par Cortical: No extinction  \par Coordination: No dysmetria to FTN and HTS \par Gait: No postural instability. Normal stance and tandem gait. \par \par NIF -60\par MOCA: 17/30 (deficit in abstraction/recall)\par

## 2023-03-03 NOTE — END OF VISIT
[FreeTextEntry3] : I visited the patient with PA. 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 MOCA score: 17. Most likely ALS/FDT familial. On exam has right facial weakness, mild tongue atrophy but intact tongue motor, L>R IO atrophy and IO and ABP weakness with finger extension weakness, bilateral ankle dorsiflexion and eversion weakness, fasciculation in the arms and symmetric hyperreflexia consistent with diagnosis of ALS. Familial and limb onset. Some improvement of muscle spasm on Radicava. Still present and mostly at night plus worsening cognition and speech at night. Could try low dose Baclofen and gabapentin for spasm. Will start paper work up for [facility]. PT for three times a week. [facility] consult. RTC in 3 months

## 2023-03-06 ENCOUNTER — RX RENEWAL (OUTPATIENT)
Age: 73
End: 2023-03-06

## 2023-03-06 ENCOUNTER — OUTPATIENT (OUTPATIENT)
Dept: OUTPATIENT SERVICES | Facility: HOSPITAL | Age: 73
LOS: 1 days | End: 2023-03-06

## 2023-03-06 DIAGNOSIS — Z96.659 PRESENCE OF UNSPECIFIED ARTIFICIAL KNEE JOINT: Chronic | ICD-10-CM

## 2023-03-06 DIAGNOSIS — Z00.00 ENCOUNTER FOR GENERAL ADULT MEDICAL EXAMINATION WITHOUT ABNORMAL FINDINGS: ICD-10-CM

## 2023-03-06 DIAGNOSIS — S69.92XA UNSPECIFIED INJURY OF LEFT WRIST, HAND AND FINGER(S), INITIAL ENCOUNTER: Chronic | ICD-10-CM

## 2023-03-07 ENCOUNTER — RESULT REVIEW (OUTPATIENT)
Age: 73
End: 2023-03-07

## 2023-03-07 ENCOUNTER — OUTPATIENT (OUTPATIENT)
Dept: OUTPATIENT SERVICES | Facility: HOSPITAL | Age: 73
LOS: 1 days | End: 2023-03-07
Payer: MEDICARE

## 2023-03-07 DIAGNOSIS — M79.671 PAIN IN RIGHT FOOT: ICD-10-CM

## 2023-03-07 DIAGNOSIS — S69.92XA UNSPECIFIED INJURY OF LEFT WRIST, HAND AND FINGER(S), INITIAL ENCOUNTER: Chronic | ICD-10-CM

## 2023-03-07 DIAGNOSIS — Z96.659 PRESENCE OF UNSPECIFIED ARTIFICIAL KNEE JOINT: Chronic | ICD-10-CM

## 2023-03-07 DIAGNOSIS — Z00.8 ENCOUNTER FOR OTHER GENERAL EXAMINATION: ICD-10-CM

## 2023-03-07 PROCEDURE — 76882 US LMTD JT/FCL EVL NVASC XTR: CPT | Mod: RT

## 2023-03-07 PROCEDURE — 76882 US LMTD JT/FCL EVL NVASC XTR: CPT | Mod: 26,RT

## 2023-03-08 ENCOUNTER — OUTPATIENT (OUTPATIENT)
Dept: OUTPATIENT SERVICES | Facility: HOSPITAL | Age: 73
LOS: 1 days | End: 2023-03-08

## 2023-03-08 DIAGNOSIS — Z00.00 ENCOUNTER FOR GENERAL ADULT MEDICAL EXAMINATION WITHOUT ABNORMAL FINDINGS: ICD-10-CM

## 2023-03-08 DIAGNOSIS — M79.671 PAIN IN RIGHT FOOT: ICD-10-CM

## 2023-03-08 DIAGNOSIS — S69.92XA UNSPECIFIED INJURY OF LEFT WRIST, HAND AND FINGER(S), INITIAL ENCOUNTER: Chronic | ICD-10-CM

## 2023-03-08 DIAGNOSIS — Z96.659 PRESENCE OF UNSPECIFIED ARTIFICIAL KNEE JOINT: Chronic | ICD-10-CM

## 2023-03-13 ENCOUNTER — OUTPATIENT (OUTPATIENT)
Dept: OUTPATIENT SERVICES | Facility: HOSPITAL | Age: 73
LOS: 1 days | End: 2023-03-13

## 2023-03-13 DIAGNOSIS — Z96.659 PRESENCE OF UNSPECIFIED ARTIFICIAL KNEE JOINT: Chronic | ICD-10-CM

## 2023-03-13 DIAGNOSIS — S69.92XA UNSPECIFIED INJURY OF LEFT WRIST, HAND AND FINGER(S), INITIAL ENCOUNTER: Chronic | ICD-10-CM

## 2023-03-13 DIAGNOSIS — Z00.00 ENCOUNTER FOR GENERAL ADULT MEDICAL EXAMINATION WITHOUT ABNORMAL FINDINGS: ICD-10-CM

## 2023-03-14 DIAGNOSIS — G12.21 AMYOTROPHIC LATERAL SCLEROSIS: ICD-10-CM

## 2023-03-15 ENCOUNTER — OUTPATIENT (OUTPATIENT)
Dept: OUTPATIENT SERVICES | Facility: HOSPITAL | Age: 73
LOS: 1 days | End: 2023-03-15

## 2023-03-15 DIAGNOSIS — Z00.00 ENCOUNTER FOR GENERAL ADULT MEDICAL EXAMINATION WITHOUT ABNORMAL FINDINGS: ICD-10-CM

## 2023-03-15 DIAGNOSIS — S69.92XA UNSPECIFIED INJURY OF LEFT WRIST, HAND AND FINGER(S), INITIAL ENCOUNTER: Chronic | ICD-10-CM

## 2023-03-15 DIAGNOSIS — G12.21 AMYOTROPHIC LATERAL SCLEROSIS: ICD-10-CM

## 2023-03-15 DIAGNOSIS — Z96.659 PRESENCE OF UNSPECIFIED ARTIFICIAL KNEE JOINT: Chronic | ICD-10-CM

## 2023-03-20 ENCOUNTER — OUTPATIENT (OUTPATIENT)
Dept: OUTPATIENT SERVICES | Facility: HOSPITAL | Age: 73
LOS: 1 days | End: 2023-03-20

## 2023-03-20 DIAGNOSIS — S69.92XA UNSPECIFIED INJURY OF LEFT WRIST, HAND AND FINGER(S), INITIAL ENCOUNTER: Chronic | ICD-10-CM

## 2023-03-20 DIAGNOSIS — Z00.00 ENCOUNTER FOR GENERAL ADULT MEDICAL EXAMINATION WITHOUT ABNORMAL FINDINGS: ICD-10-CM

## 2023-03-20 DIAGNOSIS — G12.21 AMYOTROPHIC LATERAL SCLEROSIS: ICD-10-CM

## 2023-03-20 DIAGNOSIS — Z96.659 PRESENCE OF UNSPECIFIED ARTIFICIAL KNEE JOINT: Chronic | ICD-10-CM

## 2023-03-21 DIAGNOSIS — G12.21 AMYOTROPHIC LATERAL SCLEROSIS: ICD-10-CM

## 2023-03-22 ENCOUNTER — OUTPATIENT (OUTPATIENT)
Dept: OUTPATIENT SERVICES | Facility: HOSPITAL | Age: 73
LOS: 1 days | End: 2023-03-22

## 2023-03-22 DIAGNOSIS — G12.21 AMYOTROPHIC LATERAL SCLEROSIS: ICD-10-CM

## 2023-03-22 DIAGNOSIS — Z00.00 ENCOUNTER FOR GENERAL ADULT MEDICAL EXAMINATION WITHOUT ABNORMAL FINDINGS: ICD-10-CM

## 2023-03-22 DIAGNOSIS — Z96.659 PRESENCE OF UNSPECIFIED ARTIFICIAL KNEE JOINT: Chronic | ICD-10-CM

## 2023-03-22 DIAGNOSIS — S69.92XA UNSPECIFIED INJURY OF LEFT WRIST, HAND AND FINGER(S), INITIAL ENCOUNTER: Chronic | ICD-10-CM

## 2023-03-23 DIAGNOSIS — G12.21 AMYOTROPHIC LATERAL SCLEROSIS: ICD-10-CM

## 2023-03-27 ENCOUNTER — OUTPATIENT (OUTPATIENT)
Dept: OUTPATIENT SERVICES | Facility: HOSPITAL | Age: 73
LOS: 1 days | End: 2023-03-27

## 2023-03-27 DIAGNOSIS — Z00.00 ENCOUNTER FOR GENERAL ADULT MEDICAL EXAMINATION WITHOUT ABNORMAL FINDINGS: ICD-10-CM

## 2023-03-27 DIAGNOSIS — G12.21 AMYOTROPHIC LATERAL SCLEROSIS: ICD-10-CM

## 2023-03-27 DIAGNOSIS — Z96.659 PRESENCE OF UNSPECIFIED ARTIFICIAL KNEE JOINT: Chronic | ICD-10-CM

## 2023-03-27 DIAGNOSIS — S69.92XA UNSPECIFIED INJURY OF LEFT WRIST, HAND AND FINGER(S), INITIAL ENCOUNTER: Chronic | ICD-10-CM

## 2023-03-29 ENCOUNTER — OUTPATIENT (OUTPATIENT)
Dept: OUTPATIENT SERVICES | Facility: HOSPITAL | Age: 73
LOS: 1 days | End: 2023-03-29

## 2023-03-29 ENCOUNTER — APPOINTMENT (OUTPATIENT)
Dept: NEUROLOGY | Facility: CLINIC | Age: 73
End: 2023-03-29

## 2023-03-29 DIAGNOSIS — Z00.00 ENCOUNTER FOR GENERAL ADULT MEDICAL EXAMINATION WITHOUT ABNORMAL FINDINGS: ICD-10-CM

## 2023-03-29 DIAGNOSIS — S69.92XA UNSPECIFIED INJURY OF LEFT WRIST, HAND AND FINGER(S), INITIAL ENCOUNTER: Chronic | ICD-10-CM

## 2023-03-29 DIAGNOSIS — G12.21 AMYOTROPHIC LATERAL SCLEROSIS: ICD-10-CM

## 2023-03-29 DIAGNOSIS — Z96.659 PRESENCE OF UNSPECIFIED ARTIFICIAL KNEE JOINT: Chronic | ICD-10-CM

## 2023-04-03 ENCOUNTER — OUTPATIENT (OUTPATIENT)
Dept: OUTPATIENT SERVICES | Facility: HOSPITAL | Age: 73
LOS: 1 days | End: 2023-04-03
Payer: MEDICARE

## 2023-04-03 DIAGNOSIS — G12.21 AMYOTROPHIC LATERAL SCLEROSIS: ICD-10-CM

## 2023-04-03 DIAGNOSIS — Z96.659 PRESENCE OF UNSPECIFIED ARTIFICIAL KNEE JOINT: Chronic | ICD-10-CM

## 2023-04-03 DIAGNOSIS — Z00.00 ENCOUNTER FOR GENERAL ADULT MEDICAL EXAMINATION WITHOUT ABNORMAL FINDINGS: ICD-10-CM

## 2023-04-03 DIAGNOSIS — S69.92XA UNSPECIFIED INJURY OF LEFT WRIST, HAND AND FINGER(S), INITIAL ENCOUNTER: Chronic | ICD-10-CM

## 2023-04-03 PROCEDURE — 97110 THERAPEUTIC EXERCISES: CPT | Mod: GP

## 2023-04-05 ENCOUNTER — OUTPATIENT (OUTPATIENT)
Dept: OUTPATIENT SERVICES | Facility: HOSPITAL | Age: 73
LOS: 1 days | End: 2023-04-05

## 2023-04-05 DIAGNOSIS — Z00.00 ENCOUNTER FOR GENERAL ADULT MEDICAL EXAMINATION WITHOUT ABNORMAL FINDINGS: ICD-10-CM

## 2023-04-05 DIAGNOSIS — G12.21 AMYOTROPHIC LATERAL SCLEROSIS: ICD-10-CM

## 2023-04-05 DIAGNOSIS — Z96.659 PRESENCE OF UNSPECIFIED ARTIFICIAL KNEE JOINT: Chronic | ICD-10-CM

## 2023-04-05 DIAGNOSIS — S69.92XA UNSPECIFIED INJURY OF LEFT WRIST, HAND AND FINGER(S), INITIAL ENCOUNTER: Chronic | ICD-10-CM

## 2023-04-06 ENCOUNTER — APPOINTMENT (OUTPATIENT)
Dept: OTOLARYNGOLOGY | Facility: CLINIC | Age: 73
End: 2023-04-06
Payer: MEDICARE

## 2023-04-06 VITALS — HEIGHT: 67 IN | BODY MASS INDEX: 26.06 KG/M2 | WEIGHT: 166 LBS

## 2023-04-06 DIAGNOSIS — G12.21 AMYOTROPHIC LATERAL SCLEROSIS: ICD-10-CM

## 2023-04-06 DIAGNOSIS — H90.3 SENSORINEURAL HEARING LOSS, BILATERAL: ICD-10-CM

## 2023-04-06 PROCEDURE — 99204 OFFICE O/P NEW MOD 45 MIN: CPT | Mod: 25

## 2023-04-06 PROCEDURE — 31231 NASAL ENDOSCOPY DX: CPT

## 2023-04-06 PROCEDURE — 92550 TYMPANOMETRY & REFLEX THRESH: CPT

## 2023-04-06 PROCEDURE — 92557 COMPREHENSIVE HEARING TEST: CPT

## 2023-04-06 NOTE — ASSESSMENT
[FreeTextEntry1] : Continue AStelin for allergic component of rhinorrhea\par VMR in setting of ALS - add IB\par Clear for HAE AU

## 2023-04-06 NOTE — HISTORY OF PRESENT ILLNESS
[de-identified] : Patient presents today c/o clogged ears , decreased hearing in left ear . sinusitis .  Patient both ears feel clogged and his left ear has some muffled hearing .  He also c/o a constant  runny nose and sinus pressure.

## 2023-04-06 NOTE — REASON FOR VISIT
[Initial Evaluation] : an initial evaluation for [FreeTextEntry2] : clogged ears, hearing loss ,  sinusitis

## 2023-04-07 ENCOUNTER — OUTPATIENT (OUTPATIENT)
Dept: OUTPATIENT SERVICES | Facility: HOSPITAL | Age: 73
LOS: 1 days | End: 2023-04-07

## 2023-04-07 DIAGNOSIS — G12.21 AMYOTROPHIC LATERAL SCLEROSIS: ICD-10-CM

## 2023-04-07 DIAGNOSIS — S69.92XA UNSPECIFIED INJURY OF LEFT WRIST, HAND AND FINGER(S), INITIAL ENCOUNTER: Chronic | ICD-10-CM

## 2023-04-07 DIAGNOSIS — Z96.659 PRESENCE OF UNSPECIFIED ARTIFICIAL KNEE JOINT: Chronic | ICD-10-CM

## 2023-04-12 ENCOUNTER — OUTPATIENT (OUTPATIENT)
Dept: OUTPATIENT SERVICES | Facility: HOSPITAL | Age: 73
LOS: 1 days | End: 2023-04-12

## 2023-04-12 DIAGNOSIS — S69.92XA UNSPECIFIED INJURY OF LEFT WRIST, HAND AND FINGER(S), INITIAL ENCOUNTER: Chronic | ICD-10-CM

## 2023-04-12 DIAGNOSIS — Z00.00 ENCOUNTER FOR GENERAL ADULT MEDICAL EXAMINATION WITHOUT ABNORMAL FINDINGS: ICD-10-CM

## 2023-04-12 DIAGNOSIS — G12.21 AMYOTROPHIC LATERAL SCLEROSIS: ICD-10-CM

## 2023-04-12 DIAGNOSIS — Z96.659 PRESENCE OF UNSPECIFIED ARTIFICIAL KNEE JOINT: Chronic | ICD-10-CM

## 2023-04-20 PROBLEM — G47.31 SLEEP APNEA, CENTRAL: Status: ACTIVE | Noted: 2023-01-01

## 2023-04-20 NOTE — PHYSICAL EXAM
[No Acute Distress] : no acute distress [Normal Oropharynx] : normal oropharynx [Normal Appearance] : normal appearance [No Neck Mass] : no neck mass [Normal Rate/Rhythm] : normal rate/rhythm [Normal S1, S2] : normal s1, s2 [No Murmurs] : no murmurs [No Resp Distress] : no resp distress [Clear to Auscultation Bilaterally] : clear to auscultation bilaterally [No Abnormalities] : no abnormalities [Benign] : benign [No Clubbing] : no clubbing [No Cyanosis] : no cyanosis [No Edema] : no edema [FROM] : FROM [Normal Color/ Pigmentation] : normal color/ pigmentation [TextBox_99] : stiff gait [TextBox_140] : flat affect

## 2023-04-20 NOTE — HISTORY OF PRESENT ILLNESS
[Former] : former [>= 20 pack years] : >= 20 pack years [TextBox_4] : Mr. 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 presenting today to the clinic for evaluation of his pulmonary function.\par \par breathing not a major concern\par other areas more troublesome\par activity not limited by breathing, sometimes breathes heavy, especially during PT 3x/wk\par Very very tired when coming home\par \par NIF -60 in March 2023\par \par # Apnea Screening\par 	(  ) Snoring while asleep?\par 	( x ) Tiredness during the day?\par 	(  ) Observed overnight apnea?\par 	(  ) Pressure elevated?\par 	(  ) BMI > 35?\par 	( x ) Age > 50?\par 	(  ) Neck circumference >16in?\par 	( x ) Gender = male?\par \par \par 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

## 2023-04-25 NOTE — DATA REVIEWED
[FreeTextEntry1] : I reviewed the patient's x-rays.  They are negative for any fracture or dislocation.  He has arthritis of the first MTP joint.  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

## 2023-04-25 NOTE — HISTORY OF PRESENT ILLNESS
[de-identified] : This a very pleasant 72-year-old patient who comes in with right foot pain.  This particular pain began back in December.  Denies any history of trauma.  The pain is pretty much stayed the same.  He localized the pain to the forefoot especially over the middle metatarsal heads.  He does have a history of ALS.  He has a dropfoot on the same side.  He is currently wearing a AFO brace for that foot.

## 2023-04-25 NOTE — IMAGING
[de-identified] : Is alert oriented x3.  He is pleasant cooperative that exam.  I examined his right lower extremity.  On the right side he does have a dropfoot with 0 out of 5 strength for ankle dorsiflexion and eversion.  He has an equinus contracture and is unable to dorsiflex actively or passively past 20 degrees.  He is tender to palpation along the central metatarsals.  He is tender at the second and third webspace.

## 2023-04-25 NOTE — DISCUSSION/SUMMARY
[de-identified] : I discussed the patient's findings with him.  I would like to get an MRI without contrast in order to better assess the neuroma as well as any possible surrounding pathology.I will see him back after the MRI is done.

## 2023-05-08 PROBLEM — Z87.891 FORMER SMOKER: Status: ACTIVE | Noted: 2019-03-04

## 2023-05-08 NOTE — PLAN
[FreeTextEntry1] : Plan:\par -Low Dose CT chest for lung cancer screening\par -Follow up with patient and his referring provider after his LDCT results have been reviewed by the multi-disciplinary clinical team\par -Encouraged continued smoking abstinence\par \par Should I Screen? tool utilized. 6 year risk of lung cancer is 4.4%. Patient wishes to proceed with screening.\par \par Engaged in shared decision making with . HARRIET RAI . Answered all questions. He verbalized understanding and agreement. He knows to call back with any questions or concerns\par \par \par

## 2023-05-08 NOTE — HISTORY OF PRESENT ILLNESS
[Former] : Former [TextBox_13] : Referred by Dr. Smiley\par \par Mr. HARRIET RAI  is a 72 year old man with a history of ALS.\par \par He  was seen in the office by Dr. Smiley for review of eligibility for, as well as, discussion of Low-Dose CT lung cancer screening program. Over the telephone today we reviewed and confirmed that the patient meets screening eligibility criteria:\par -Age: 72 year \par Smoking status:\par -Former smoker\par -Number of pack(s) per day: 1-2\par -Number of years smoked: 44\par -Number of pack years smokin+\par -Number of years since quitting smokin \par -Quit year: \par \par Mr. RAI denies any signs or symptoms of lung cancer including new cough, change in cough, hemoptysis and unintentional weight loss. \par \par Mr. RAI denies any personal history of lung cancer. No lung cancer in a 1st degree relative. Denies any history of lung disease. Denies any history of occupational exposures. [YearQuit] : 2022 [PacksperDay] : 1 [N_Years] : 44 [PacksperYear] : 50+

## 2023-05-24 NOTE — DATA REVIEWED
[FreeTextEntry1] : Reviewed the patient's MRI with him.  They reveal a third webspace neuroma measuring 1 cm in its largest dimension.  No stress fracture is present.

## 2023-05-24 NOTE — HISTORY OF PRESENT ILLNESS
[de-identified] : 72-year-old patient who comes in today for follow-up of his right foot MRI.  He still having significant pain centered over the third webspace affecting his quality of life.  Pain is worsened with walking and alleviated with rest.  Denies any pain elsewhere in the foot.

## 2023-05-24 NOTE — DISCUSSION/SUMMARY
[de-identified] : I had a lengthy discussion with the patient regarding both nonsurgical and surgical treatment.  After reviewing his options the patient wished to proceed ahead with surgical treatment.  We discussed excision of the neuroma along with the expected postoperative recovery protocol.  I also do think the patient would benefit from a tendo Achilles lengthening in order to reduce the pressure at the forefoot.  I will discuss this in detail more with the patient at the time of surgery should he wish to proceed ahead with surgery. I had a lengthy discussion with the patient regarding the risks, benefits, and alternative of surgery. The risks of surgery discussed included but were not limited to infection, wound issues/breakdown, nonunion, malunion, suboptimal outcome, neurovascular compromise, failure of surgery, need for revision surgery, deep vein thrombosis, pulmonary embolism, loss of limb, and loss of life. We also discussed the expected postoperative recovery protocol including periods of immobilization and weight bearing restriction. The patient wished to proceed with surgery. \par \par \par

## 2023-05-24 NOTE — PHYSICAL EXAM
[de-identified] : Remains tender primarily over the third webspace.  There is a fixed equinus contracture and he is unable to dorsiflex passively past 30 degrees.  He has no active dorsiflexion on account of his ALS.  He is nontender and without evidence of callosity at the metatarsal heads.

## 2023-06-01 NOTE — H&P PST ADULT - NSICDXPASTMEDICALHX_GEN_ALL_CORE_FT
PAST MEDICAL HISTORY:  Acid reflux     High cholesterol      PAST MEDICAL HISTORY:  Acid reflux     ALS (amyotrophic lateral sclerosis)     High cholesterol

## 2023-06-01 NOTE — H&P PST ADULT - HISTORY OF PRESENT ILLNESS
71 Y/O MALE PT TO PAST WITH HX              PT NOW FOR SCHEDULED PROCEDURE. PT DENIES ANY CP SOB PALP COUGH DYSURIA FEVER URI. PT ABLE TO DEBBIE 1-2 FOS W/O SOB  Anesthesia Alert  NO--Difficult Airway  NO--History of neck surgery or radiation  NO--Limited ROM of neck  NO--History of Malignant hyperthermia  NO--Personal or family history of Pseudocholinesterase deficiency.  NO--Prior Anesthesia Complication  NO--Latex Allergy  NO--Loose teeth  NO--History of Rheumatoid Arthritis  NO--ALEX  NO--Bleeding risk  NO--Other_____   73 Y/O MALE PT TO PAST WITH C/O RIGHT FOOT PAIN, SHARP, PROGRESSIVELY WORSE PT NOW FOR SCHEDULED PROCEDURE ( RIGHT FOOT THIRB WEBSPACE NEUROMA EXCISION, PERC ACHILLES LENGTHENING ) . PT DENIES ANY CP SOB PALP COUGH DYSURIA FEVER URI. PT ABLE TO DEBBIE 1 BLOCK FOS W/O SOB  Anesthesia Alert  NO--Difficult Airway  NO--History of neck surgery or radiation  NO--Limited ROM of neck  NO--History of Malignant hyperthermia  NO--Personal or family history of Pseudocholinesterase deficiency.  NO--Prior Anesthesia Complication  NO--Latex Allergy  NO--Loose teeth  NO--History of Rheumatoid Arthritis  NO--ALEX  NO--Bleeding risk  NO--Other_____   73 Y/O MALE PT TO PAST WITH C/O RIGHT FOOT PAIN, SHARP, PROGRESSIVELY WORSE PT NOW FOR SCHEDULED PROCEDURE ( RIGHT FOOT THIRD WEBSPACE NEUROMA EXCISION, PERC ACHILLES LENGTHENING ) . PT DENIES ANY CP SOB PALP COUGH DYSURIA FEVER URI. PT ABLE TO DEBBIE 1 BLOCK FOS W/O SOB  Anesthesia Alert  HX- ALS*  NO--Difficult Airway  NO--History of neck surgery or radiation  NO--Limited ROM of neck  NO--History of Malignant hyperthermia  NO--Personal or family history of Pseudocholinesterase deficiency.  NO--Prior Anesthesia Complication  NO--Latex Allergy  NO--Loose teeth  NO--History of Rheumatoid Arthritis  NO--ALEX  NO--Bleeding risk  NO--Other_____

## 2023-06-01 NOTE — H&P PST ADULT - NSICDXPASTSURGICALHX_GEN_ALL_CORE_FT
PAST SURGICAL HISTORY:  History of total knee replacement     Left wrist injury during his teens from [place]     PAST SURGICAL HISTORY:  H/O arthroscopy of knee     Left wrist injury during his teens from [employer]

## 2023-06-02 PROBLEM — R25.2 MUSCLE CRAMPS: Status: ACTIVE | Noted: 2022-05-24

## 2023-06-05 PROBLEM — G12.21 AMYOTROPHIC LATERAL SCLEROSIS: Chronic | Status: ACTIVE | Noted: 2023-01-01

## 2023-06-05 NOTE — HISTORY OF PRESENT ILLNESS
[FreeTextEntry1] : Patient is 73 yo LH man with positive genetic w/u for [id number] (heterozygous), FMHx of ALS (uncle and cousin, currently none in 1st degree relative, has son in early 30s), and PMHx of esophageal spasm w/ LOC spells, accident in 2021 w/ fx of all LH fingers, who was initially seen by Dr. Cates for c/o of b/l foot drop, gait imbalance, and RH tremor in 5/2022. 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 He has prominent fasciculations throughout but also conduction blocks suggestive of MND. Other BW to look for mnd mimics were neg. 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 Is well in the AM, but by 2pm he's very tired. Sleeps at 6pm, Denies tongue movement issues  \par 6) Sleep: Sleeps well, has mild ALEX on recent sleep study - refusing to use CPAP  \par \par -Swallowing/Nutrition/Weight: Denies choking on liquids or food, no more episodes of LOC; Appetite is good, but eating slower. Weight decreased from 185 to 166 despite adequate food intake; denies swallowing issues\par -Breathing: Denies SOB with exertion/ADLs\par -Sialorrhea: NO \par -Pseudobulbar affect/Mood: More flat compared to before per wife; sadness/anger at times\par -Pain: Cramping improved, suraj while on Radicava \par -Mobility: Progressive balance issues, AFO braces b/l working well, more trouble climbing stairs; hands cramp after exertion\par -ADLs: Nml, can tie own shoes, dress, shower \par \par -Medication Tolerance: No side effects on Radicava and Riluzole. While getting meds having less cramps; Couldn't tolerate Baclofen 5 BID PRN bc made legs weaker; CBD very helpful for muscle pain\par -Equipment Needs: Has 2 story house. Can get shower chair, but Shower Bench cannot be approved bc luxury item; uses cane at night  \par -Coping/Social Support: Sufficient  \par -Palliative Care: n/a \par

## 2023-06-05 NOTE — DISCUSSION/SUMMARY
[FreeTextEntry1] : Patient is 71 yo LH man with positive genetic w/u for [id number] (heterozygous), FMHx of ALS (uncle and cousin, currently none in 1st degree relative, has son in early 30s) who likely has familial type of ALS given clinical/EMG findings and negative BW for mimics. Generally, he is stable, ALS-FRS from 41>37 , but he has weight loss despite adequate food intake and c/o increase LE weakness. His LH is weaker than RH. NIF is -45, still c/o fatigue mid-day. Not compliant with CPAP and doesn't like the masks. He will f/u w CT chest in 6 months for incidental lung nodule which most likely benign. Planning do surgery for L foot Butler's neuroma. Cognitive decline per wife (MOCA 17/30 - March 2023) - just only monitoring for now and improve his sleep and breathing. Will do modified barium swallowing per SLP.

## 2023-06-05 NOTE — END OF VISIT
[] : Resident [FreeTextEntry3] : \par Discussed various concerns today. AFOs working well but suggest trying foot-up brace at times depending on shoes. Sleep study - mild ALEX, does not want to use CPAP so no need for in lab study. Muscle cramps improved on days that he takes radicava - suggest he try b complex vitamins at night, if no improvement can do benadryl as needed. Prior auth for relyvrio, paperwork filled out today. Wife noticing cognitive decline, monitor for now, no evidence to support use of donepezil or memantine in FTD. ALS-FRS continues to worsen. \par f/u in 3 months  [Time Spent: ___ minutes] : I have spent [unfilled] minutes of time on the encounter. [>50% of the face to face encounter time was spent on counseling and/or coordination of care for ___] : Greater than 50% of the face to face encounter time was spent on counseling and/or coordination of care for [unfilled]

## 2023-06-05 NOTE — PHYSICAL EXAM
[FreeTextEntry1] : ALS-FRS - 37\par \par MS: Awake, alert, oriented to person, place, situation and time. Normal affect. Follows commands.  \par \par Language: Speech is clear, fluent with good repetition & comprehension. No dysarthria.  \par \par CNs 2 - 12 intact. EOMI no nystagmus, no diplopia. VFF. R facial/R cheek weakness; full eye closure strength b/l. Hearing grossly normal. Head turning & shoulder shrug intact b/l. Tongue midline, normal movements, no atrophy.  \par \par Motor: \par Neck flexion and extension 5/5 \par Head turn 5/5 \par Shoulder shrug 5/5 \par \par UE: Prox and Distal - fasciculations present BL \par Right: Shoulder 5/5 flex/ext 5/5, Elbow flex/ext 5/5,  IO 4/5  \par Left: Shoulder flex/ext 5/5, Elbow flex/ext 5/5,  5-/5, IO weakness 3/5, atrophy in left ABP, clawed 4th finger, finger/wrist flex 5/5, finger ext 4/5, wrist ext 5/5, deltoid/triceps flex 5/5\par \par LE: Prox and Distal - fasciculations present BL \par Right: Hip flex/ext 5/5, Knee flex/ext 5/5, dorsi/plantar flexion 0/4, eversion 4 \par Left: Hip flex/ext 5/5, Knee flex/ex 5/5, Dorsi/plantar flexion 0/4 \par \par Stand on tip toes: 4/4  \par Reflexes:  \par DTR of Biceps 3+, Triceps 3+, Brachioradialis 3+/5, Patella 3+, Achilles 3+/5 \par Pectoralis reflex 2+ \par Plantar response---Clonus\par Jaw jerk present \par Kumari + on Left \par \par Sensation: Intact to LT, temperature, pinprick, b/l throughout.  \par Cortical: No extinction  \par Coordination: No dysmetria to FTN and HTS \par Gait: No postural instability. Normal stance and tandem gait. \par \par NIF - 45\par  \par

## 2023-06-05 NOTE — ASSESSMENT
[FreeTextEntry1] : Hereditary ALS/FTD \par \par PLAN:\par - Cough assisting device authorization paperwork\par - Referral for Modified [place] swallowing \par - OK to do L foot Butler's neuroma surgery\par - Pt advised to try Vit B complex/Magnesium suppl/Benadryl for muscle cramps\par - c/w current meds\par - Ann PA - filled out paperwork today \par - PT/OT to 3x per week\par - Advised caution/supervision w/ driving; recommended driving evaluation but he deferred\par - Can do mild-mod exercise but avoid strenuous activity; double calorie intake\par -  TID PRN, rec standing at night and PRN during the day\par - Baclofen 5 QD PRN\par - F/u in 3 mo \par \par

## 2023-06-12 NOTE — ASU PATIENT PROFILE, ADULT - FALL HARM RISK - UNIVERSAL INTERVENTIONS
Bed in lowest position, wheels locked, appropriate side rails in place/Call bell, personal items and telephone in reach/Instruct patient to call for assistance before getting out of bed or chair/Non-slip footwear when patient is out of bed/[place] to call system/Physically safe environment - no spills, clutter or unnecessary equipment/Purposeful Proactive Rounding/Room/bathroom lighting operational, light cord in reach

## 2023-06-12 NOTE — ASU PATIENT PROFILE, ADULT - CAREGIVER
Anesthesia Pre Eval Note    Anes Review of Systems    Relevant Problems   Anesthesia Problems   (+) Sleep apnea, AHI 7.2, 44.2 supine, amaury sat 90%,  2020       Physical Exam     Airway   Mallampati: II  Neck ROM: Full    Cardiovascular  Cardiovascular exam normal    Dental Exam  Dental exam normal    Pulmonary Exam  Pulmonary exam normal    Abdominal Exam  Abdominal exam normal      Anesthesia Plan:    ASA Status: 3  Anesthesia Type: MAC    Induction: Intravenous  Preferred Airway Type: Mask  Maintenance: TIVA  Checklist  Reviewed: Lab Results, EKG, Past Med History, Allergies, Patient Summary, Medications, Problem list, NPO Status, Consultations, DNR Status and Beta Blocker Status  Consent/Risks Discussed Statement:  The proposed anesthetic plan, including its risks and benefits, have been discussed with the Patient and Spouse along with the risks and benefits of alternatives. Questions were encouraged and answered and the patient and/or representative understands and agrees to proceed.        I discussed with the patient (and/or patient's legal representative) the risks and benefits of the proposed anesthesia plan, MAC, which may include services performed by other anesthesia providers.    Alternative anesthesia plans, if available, were reviewed with the patient (and/or patient's legal representative). Discussion has been held with the patient (and/or patient's legal representative) regarding risks of anesthesia, which include emergent situations that may require change in anesthesia plan.  The patient (and/or patient's legal representative) has indicated understanding, his/her questions have been answered, and he/she wishes to proceed with the planned anesthetic.    Blood Products: Not Anticipated    Comments  Plan Comments:     12-8-2021  [facility]  Preop Visit   Pt with BMI 24.  OCTAVIA / no CPAP  Hx AtrFib    NKDA    Latest EKG reviewed    Discussed MAC/IV Sedation with pt and .  Questions answered.       Declines

## 2023-06-12 NOTE — ASU PATIENT PROFILE, ADULT - FALL HARM RISK - PATIENT NEEDS ASSISTANCE
Reviewed chart. NP ordered MRI today.    She does not need to be held on result. Will review results in epic.   No assistance needed

## 2023-06-12 NOTE — ASU PATIENT PROFILE, ADULT - NSICDXPASTMEDICALHX_GEN_ALL_CORE_FT
PAST MEDICAL HISTORY:  Acid reflux     ALS (amyotrophic lateral sclerosis)     High cholesterol
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
## 2023-06-13 NOTE — CHART NOTE - NSCHARTNOTEFT_GEN_A_CORE
PACU ANESTHESIA ADMISSION NOTE      Procedure: Excision, neuroma, Luke's    Single peripheral nerve block      Post op diagnosis:  Luke's neuroma, right        ____  Intubated  TV:______       Rate: ______      FiO2: ______    _X___  Patent Airway    __X__  Full return of protective reflexes    ____  Full recovery from anesthesia / back to baseline status    Vitals:  T: 97F  HR: 65  BP: 135/79  RR:18  SpO2: 95%    Mental Status:  _X___ Awake   _____ Alert   _____ Drowsy   _____ Sedated    Nausea/Vomiting:  __X__ NO  ______Yes,   See Post - Op Orders          Pain Scale (0-10):  _____    Treatment: ____ None    ___X_ See Post - Op/PCA Orders    Post - Operative Fluids:   ____ Oral   ___X_ See Post - Op Orders    Plan: Discharge:   __X__Home       _____Floor     _____Critical Care    _____  Other:_________________    Comments: NO anesthetic related complications noted. Pt. transported to PACU, report endorsed to RN

## 2023-06-13 NOTE — ASU DISCHARGE PLAN (ADULT/PEDIATRIC) - NS MD DC FALL RISK RISK
For information on Fall & Injury Prevention, visit: [email or web address] OR  [email or web address] OR  https://www.cdc.gov/steadi/patient.html

## 2023-06-13 NOTE — PRE-ANESTHESIA EVALUATION ADULT - SPO2 (%)
[facility]  [address] [place] [id number]    NAME: Anthony De Oliveira       [facility] REC #:[id number]    DATE: 10/23/2020  YOB: 1967    PHYSICIAN: Joe Cassidy M.D.    PREOPERATIVE DIAGNOSIS: left leg varicose veins.     POSTOPERATIVE DIAGNOSIS:  left leg varicose veins.     PROCEDURE: Radiofrequency closure of left leg GSV and 4 stabs     ASSISTANT: None    ANESTHESIA: general with local    ESTIMATED BLOOD LOSS: 75    COMPLICATIONS: None    DISPOSITION:  To recovery room.    DRAINS:  None    SPECIMENS: Veins.    INDICATIONS:  Patient is a 53 year old male with painful legs and varicose veins on the Left side. No evidence of DVT.  Ultrasound shows GSV incompetence.  Patient failed conservative treatment.    FINDINGS:  Successful closure of Left GSV.  Deep femoral vein and SFJ patent.    DETAILS OF PROCEDURE:  Informed consent obtained. Risks and benefits explained. Questions answered. Alternatives discussed. Risks include but not limited to pain, infection, bleeding, heart attack, death, injury to internal organs, blood clot, skin damage/scar/burn, need for further surgery, nerve damage or recurrence.    Patient brought to the operating room, placed in a supine position. He had general anesthetic placed. Leg was clipped of hair and prepped with Betadine after marking.  Ultrasound of leg identified the SFJ, deep femoral vein, and GSV.  The GSV was mapped and marked along its course with any duplications, branches, perforators.  An appropriate access site was identified on the Left leg.   The GSV was accessed with introducer needle under ultrasound and wire passed.  The dilator and sheath was then passed and flushed.  The Closure catheter was then placed under ultrasound and advance to 2 cm distal to SFJ and distal to epigastric branch.  Tumescent was then infiltrated into saphenous sheath and subcutaneous tissue. Vein with catheter was at least 1 cm from skin after  inflitration.  Position of closure catheter was again checked.  Closure then performed in usual fashion, upper segment twice.  Catheter removed, pressure held.    U/S showed deep system patent .    Four incisions made in lower leg, ligated large varicosities, skin closed 4-0 nylon.abd  Dressings applied, compression thigh to toes.  Sponge needle, instrument count correct times two. Patient to recovery.    Electronically signed by: Joe Cassidy MD  10/23/2020         98

## 2023-06-28 NOTE — PHYSICAL EXAM
[de-identified] : Skin is intact.  His incision is healing well.  No evidence of infection.  No change in neurovascular status from his prior exams.

## 2023-06-28 NOTE — DISCUSSION/SUMMARY
[de-identified] : I discussed the patient's findings with him.  His postop pathology did confirm a diagnosis of a neuroma.  The sutures were removed.  Steri-Strips placed.  This point the patient can begin weightbearing as tolerated without a assistive device.  He will continue using his brace.  I will see him back in 1 month for repeat clinical exam.

## 2023-06-28 NOTE — HISTORY OF PRESENT ILLNESS
[de-identified] : 72-year-old male patient comes in today for postoperative exam.  He is status post a right foot third webspace Butler's neuroma incision.  He is doing well.  His pain is controlled.  Denies any fevers chills calf pain chest pain shortness of breath.

## 2023-07-27 NOTE — HISTORY OF PRESENT ILLNESS
[Former] : former [>= 20 pack years] : >= 20 pack years [TextBox_4] : Mr. 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 presenting today to the clinic for evaluation of his pulmonary function.\par \par breathing not a major concern\par other areas more troublesome\par activity not limited by breathing, sometimes breathes heavy, especially during PT 3x/wk\par Very very tired when coming home\par \par NIF -60 in March 2023\par \par # Apnea Screening\par 	(  ) Snoring while asleep?\par 	( x ) Tiredness during the day?\par 	(  ) Observed overnight apnea?\par 	(  ) Pressure elevated?\par 	(  ) BMI > 35?\par 	( x ) Age > 50?\par 	(  ) Neck circumference >16in?\par 	( x ) Gender = male?\par \par \par Occupational Hx: Was  (, 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 [TextBox_11] : 1 [TextBox_13] : 60 [YearQuit] : 2022

## 2023-07-28 PROBLEM — G57.61 MORTON'S NEUROMA OF RIGHT FOOT: Status: ACTIVE | Noted: 2023-01-01

## 2023-07-28 NOTE — DISCUSSION/SUMMARY
[de-identified] : I encouraged and instructed his wife on how to massage the area to break down some of the scar tissue that does not ambulate for him.  He will continue massaging the plantar fascia as well 2.  I will see him back in as-needed basis going forward.  All questions answered.

## 2023-07-28 NOTE — HISTORY OF PRESENT ILLNESS
[de-identified] : Patient presents today for postoperative exam.  He is proximately 6 weeks status post a right foot third webspace neuroma excision.  Overall he is doing much better.  He has less pain walk around and he was prior to surgery.  He still gets occasional pain most notably when he sitting down.  This is further back.

## 2023-07-28 NOTE — PHYSICAL EXAM
[de-identified] : His incisions fully healed.  No evidence of infection.  He has no tenderness palpation of the third webspace.  Proximal to the third webspace he does have some pain along the plantar surface of the plantar fascia.  Otherwise no issues.
Principal Discharge DX:	Joint swelling

## 2023-08-04 PROBLEM — Z82.0 FAMILY HISTORY OF AMYOTROPHIC LATERAL SCLEROSIS: Status: ACTIVE | Noted: 2023-01-01

## 2023-08-04 PROBLEM — G12.20 MOTOR NEURON DISEASE: Status: ACTIVE | Noted: 2022-11-04
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
## 2023-08-04 NOTE — END OF VISIT
[FreeTextEntry3] : Visited the patient. No significant worsening. Cramps are better controlled. Right shoulder joint arthritis left hand IO weakness w Bilateral foot drop, Continue moderate exercises. Encouraged to gait weight. Continue Radicava and Rilutek. LFT today. RTC in 4 months

## 2023-08-04 NOTE — PHYSICAL EXAM
[FreeTextEntry1] : Focal neurological exam:   MS: Awake, alert, oriented to person, place, situation and time. Normal affect. Follows commands.    Language: Speech is clear, fluent with good repetition & comprehension. No dysarthria.    CNs 2 - 12 intact. EOMI no nystagmus, no diplopia. VFF. No facial asymmetry b/l, full eye closure strength b/l. Hearing grossly normal. Head turning & shoulder shrug intact b/l. Tongue midline, normal movements, no atrophy.   Motor:   Neck flexion and extension 5/5 Head turn 5/5 Shoulder shrug 5/5 Tongue 4/4 Slight right facial weakness when puffing cheeks.    UE: Prox and Distal  - fasciculations present BL Right: Deltoid 5/5 flex/ext 5/5, Elbow flex/ext 5/5,  5/5, IO 5/5  Left: Deltoid flex/ext 5/5, Elbow flex/ext 4+/5,  4/5, IO weakness 4/5, atrophy in left ABP, Atrophy R FDI  Fasciculations in bl arms   LE: Prox and Distal Right: Hip flex/ext 5/5, Knee flex/ext 5/5, ankle eversion 2/5, Plantar flexion 3/5, ankle inversion 1/5, ankle dorsiflexion 0/5 Left: Hip flex/ext 4+/5, Knee flex/ex 5/5, ankle dorsiflexion 0/5, ankle Plantar flexion 4+/5, ankle Eversion 4/5, ankle inversion 0/5  Reflexes:  DTR of Biceps 3+, Triceps 3+, Brachioradialis 3+, Patella 4+ Kumari + on right  Sensation: Intact to LT, temperature, pinprick, b/l throughout.   Cortical: No extinction   Coordination: No dysmetria to FTN and HTS  Gait: No postural instability. Normal stance and tandem gait with a cane.   halfway-FRS 40, improved from last visit in June 2023- was 37.  Can hold breathe in for 20 seconds.

## 2023-10-26 PROBLEM — H93.8X3 CLOGGED EAR, BILATERAL: Status: ACTIVE | Noted: 2023-04-06

## 2023-10-26 PROBLEM — H61.22 IMPACTED CERUMEN OF LEFT EAR: Status: ACTIVE | Noted: 2023-01-01

## 2023-12-01 PROBLEM — R63.0 APPETITE IMPAIRED: Status: ACTIVE | Noted: 2023-01-01

## 2023-12-01 PROBLEM — M21.371 BILATERAL FOOT-DROP: Status: ACTIVE | Noted: 2022-05-24

## 2024-01-01 ENCOUNTER — NON-APPOINTMENT (OUTPATIENT)
Age: 74
End: 2024-01-01

## 2024-01-01 ENCOUNTER — OUTPATIENT (OUTPATIENT)
Dept: OUTPATIENT SERVICES | Facility: HOSPITAL | Age: 74
LOS: 1 days | End: 2024-01-01

## 2024-01-01 ENCOUNTER — TRANSCRIPTION ENCOUNTER (OUTPATIENT)
Age: 74
End: 2024-01-01

## 2024-01-01 ENCOUNTER — APPOINTMENT (OUTPATIENT)
Dept: PULMONOLOGY | Facility: CLINIC | Age: 74
End: 2024-01-01
Payer: MEDICARE

## 2024-01-01 ENCOUNTER — INPATIENT (INPATIENT)
Facility: HOSPITAL | Age: 74
LOS: 0 days | DRG: 189 | End: 2024-04-14
Attending: INTERNAL MEDICINE | Admitting: STUDENT IN AN ORGANIZED HEALTH CARE EDUCATION/TRAINING PROGRAM
Payer: MEDICARE

## 2024-01-01 ENCOUNTER — APPOINTMENT (OUTPATIENT)
Dept: NEUROLOGY | Facility: CLINIC | Age: 74
End: 2024-01-01
Payer: MEDICARE

## 2024-01-01 ENCOUNTER — RESULT REVIEW (OUTPATIENT)
Age: 74
End: 2024-01-01

## 2024-01-01 ENCOUNTER — APPOINTMENT (OUTPATIENT)
Dept: NEUROLOGY | Facility: CLINIC | Age: 74
End: 2024-01-01

## 2024-01-01 ENCOUNTER — RX RENEWAL (OUTPATIENT)
Age: 74
End: 2024-01-01

## 2024-01-01 ENCOUNTER — OUTPATIENT (OUTPATIENT)
Dept: OUTPATIENT SERVICES | Facility: HOSPITAL | Age: 74
LOS: 1 days | End: 2024-01-01
Payer: MEDICARE

## 2024-01-01 ENCOUNTER — APPOINTMENT (OUTPATIENT)
Dept: PULMONOLOGY | Facility: CLINIC | Age: 74
End: 2024-01-01

## 2024-01-01 ENCOUNTER — APPOINTMENT (OUTPATIENT)
Dept: OTOLARYNGOLOGY | Facility: CLINIC | Age: 74
End: 2024-01-01
Payer: MEDICARE

## 2024-01-01 ENCOUNTER — INPATIENT (INPATIENT)
Facility: HOSPITAL | Age: 74
LOS: 3 days | Discharge: HOME HEALTH | DRG: 193 | End: 2024-04-04
Attending: STUDENT IN AN ORGANIZED HEALTH CARE EDUCATION/TRAINING PROGRAM | Admitting: INTERNAL MEDICINE
Payer: MEDICARE

## 2024-01-01 VITALS
WEIGHT: 135 LBS | HEIGHT: 67 IN | RESPIRATION RATE: 14 BRPM | DIASTOLIC BLOOD PRESSURE: 80 MMHG | BODY MASS INDEX: 21.19 KG/M2 | OXYGEN SATURATION: 96 % | HEART RATE: 90 BPM | SYSTOLIC BLOOD PRESSURE: 128 MMHG

## 2024-01-01 VITALS
WEIGHT: 128.53 LBS | OXYGEN SATURATION: 98 % | TEMPERATURE: 95 F | DIASTOLIC BLOOD PRESSURE: 84 MMHG | HEART RATE: 81 BPM | RESPIRATION RATE: 24 BRPM | SYSTOLIC BLOOD PRESSURE: 146 MMHG

## 2024-01-01 VITALS
SYSTOLIC BLOOD PRESSURE: 160 MMHG | OXYGEN SATURATION: 92 % | BODY MASS INDEX: 20.4 KG/M2 | HEIGHT: 67 IN | HEART RATE: 102 BPM | WEIGHT: 130 LBS | DIASTOLIC BLOOD PRESSURE: 90 MMHG

## 2024-01-01 VITALS
TEMPERATURE: 97 F | WEIGHT: 154.98 LBS | SYSTOLIC BLOOD PRESSURE: 161 MMHG | HEART RATE: 116 BPM | OXYGEN SATURATION: 94 % | RESPIRATION RATE: 20 BRPM | DIASTOLIC BLOOD PRESSURE: 89 MMHG

## 2024-01-01 VITALS
WEIGHT: 133 LBS | OXYGEN SATURATION: 96 % | HEART RATE: 63 BPM | BODY MASS INDEX: 20.88 KG/M2 | SYSTOLIC BLOOD PRESSURE: 133 MMHG | HEIGHT: 67 IN | DIASTOLIC BLOOD PRESSURE: 79 MMHG

## 2024-01-01 VITALS
SYSTOLIC BLOOD PRESSURE: 150 MMHG | RESPIRATION RATE: 18 BRPM | HEART RATE: 120 BPM | TEMPERATURE: 98 F | DIASTOLIC BLOOD PRESSURE: 94 MMHG | OXYGEN SATURATION: 94 %

## 2024-01-01 VITALS — OXYGEN SATURATION: 97 %

## 2024-01-01 DIAGNOSIS — G12.21 AMYOTROPHIC LATERAL SCLEROSIS: ICD-10-CM

## 2024-01-01 DIAGNOSIS — S69.92XA UNSPECIFIED INJURY OF LEFT WRIST, HAND AND FINGER(S), INITIAL ENCOUNTER: Chronic | ICD-10-CM

## 2024-01-01 DIAGNOSIS — H81.11 BENIGN PAROXYSMAL VERTIGO, RIGHT EAR: ICD-10-CM

## 2024-01-01 DIAGNOSIS — J44.1 CHRONIC OBSTRUCTIVE PULMONARY DISEASE WITH (ACUTE) EXACERBATION: ICD-10-CM

## 2024-01-01 DIAGNOSIS — E43 UNSPECIFIED SEVERE PROTEIN-CALORIE MALNUTRITION: ICD-10-CM

## 2024-01-01 DIAGNOSIS — R09.89 OTHER SPECIFIED SYMPTOMS AND SIGNS INVOLVING THE CIRCULATORY AND RESPIRATORY SYSTEMS: ICD-10-CM

## 2024-01-01 DIAGNOSIS — Z98.890 OTHER SPECIFIED POSTPROCEDURAL STATES: Chronic | ICD-10-CM

## 2024-01-01 DIAGNOSIS — K21.9 GASTRO-ESOPHAGEAL REFLUX DISEASE WITHOUT ESOPHAGITIS: ICD-10-CM

## 2024-01-01 DIAGNOSIS — J18.9 PNEUMONIA, UNSPECIFIED ORGANISM: ICD-10-CM

## 2024-01-01 DIAGNOSIS — R91.8 OTHER NONSPECIFIC ABNORMAL FINDING OF LUNG FIELD: ICD-10-CM

## 2024-01-01 DIAGNOSIS — J30.0 VASOMOTOR RHINITIS: ICD-10-CM

## 2024-01-01 DIAGNOSIS — E78.00 PURE HYPERCHOLESTEROLEMIA, UNSPECIFIED: ICD-10-CM

## 2024-01-01 DIAGNOSIS — Z79.51 LONG TERM (CURRENT) USE OF INHALED STEROIDS: ICD-10-CM

## 2024-01-01 DIAGNOSIS — J96.01 ACUTE RESPIRATORY FAILURE WITH HYPOXIA: ICD-10-CM

## 2024-01-01 DIAGNOSIS — R00.0 TACHYCARDIA, UNSPECIFIED: ICD-10-CM

## 2024-01-01 DIAGNOSIS — Z79.899 OTHER LONG TERM (CURRENT) DRUG THERAPY: ICD-10-CM

## 2024-01-01 DIAGNOSIS — Z98.890 OTHER SPECIFIED POSTPROCEDURAL STATES: ICD-10-CM

## 2024-01-01 DIAGNOSIS — J96.02 ACUTE RESPIRATORY FAILURE WITH HYPERCAPNIA: ICD-10-CM

## 2024-01-01 DIAGNOSIS — J44.0 CHRONIC OBSTRUCTIVE PULMONARY DISEASE WITH (ACUTE) LOWER RESPIRATORY INFECTION: ICD-10-CM

## 2024-01-01 DIAGNOSIS — R06.03 ACUTE RESPIRATORY DISTRESS: ICD-10-CM

## 2024-01-01 DIAGNOSIS — F17.201 NICOTINE DEPENDENCE, UNSPECIFIED, IN REMISSION: ICD-10-CM

## 2024-01-01 DIAGNOSIS — J30.9 ALLERGIC RHINITIS, UNSPECIFIED: ICD-10-CM

## 2024-01-01 DIAGNOSIS — Z87.891 PERSONAL HISTORY OF NICOTINE DEPENDENCE: ICD-10-CM

## 2024-01-01 DIAGNOSIS — J43.2 CENTRILOBULAR EMPHYSEMA: ICD-10-CM

## 2024-01-01 DIAGNOSIS — Z00.8 ENCOUNTER FOR OTHER GENERAL EXAMINATION: ICD-10-CM

## 2024-01-01 DIAGNOSIS — Z82.49 FAMILY HISTORY OF ISCHEMIC HEART DISEASE AND OTHER DISEASES OF THE CIRCULATORY SYSTEM: ICD-10-CM

## 2024-01-01 LAB
ALBUMIN SERPL ELPH-MCNC: 4 G/DL — SIGNIFICANT CHANGE UP (ref 3.5–5.2)
ALBUMIN SERPL ELPH-MCNC: 4.2 G/DL — SIGNIFICANT CHANGE UP (ref 3.5–5.2)
ALBUMIN SERPL ELPH-MCNC: 4.3 G/DL — SIGNIFICANT CHANGE UP (ref 3.5–5.2)
ALBUMIN SERPL ELPH-MCNC: 4.7 G/DL — SIGNIFICANT CHANGE UP (ref 3.5–5.2)
ALP SERPL-CCNC: 100 U/L — SIGNIFICANT CHANGE UP (ref 30–115)
ALP SERPL-CCNC: 77 U/L — SIGNIFICANT CHANGE UP (ref 30–115)
ALP SERPL-CCNC: 78 U/L — SIGNIFICANT CHANGE UP (ref 30–115)
ALP SERPL-CCNC: 86 U/L — SIGNIFICANT CHANGE UP (ref 30–115)
ALT FLD-CCNC: 20 U/L — SIGNIFICANT CHANGE UP (ref 0–41)
ALT FLD-CCNC: 20 U/L — SIGNIFICANT CHANGE UP (ref 0–41)
ALT FLD-CCNC: 22 U/L — SIGNIFICANT CHANGE UP (ref 0–41)
ALT FLD-CCNC: 38 U/L — SIGNIFICANT CHANGE UP (ref 0–41)
ANION GAP SERPL CALC-SCNC: 11 MMOL/L — SIGNIFICANT CHANGE UP (ref 7–14)
ANION GAP SERPL CALC-SCNC: 12 MMOL/L — SIGNIFICANT CHANGE UP (ref 7–14)
ANION GAP SERPL CALC-SCNC: 13 MMOL/L — SIGNIFICANT CHANGE UP (ref 7–14)
ANION GAP SERPL CALC-SCNC: 13 MMOL/L — SIGNIFICANT CHANGE UP (ref 7–14)
ANION GAP SERPL CALC-SCNC: 14 MMOL/L — SIGNIFICANT CHANGE UP (ref 7–14)
ANION GAP SERPL CALC-SCNC: 6 MMOL/L — LOW (ref 7–14)
ANION GAP SERPL CALC-SCNC: 8 MMOL/L — SIGNIFICANT CHANGE UP (ref 7–14)
ANION GAP SERPL CALC-SCNC: 9 MMOL/L — SIGNIFICANT CHANGE UP (ref 7–14)
APPEARANCE UR: ABNORMAL
AST SERPL-CCNC: 25 U/L — SIGNIFICANT CHANGE UP (ref 0–41)
AST SERPL-CCNC: 26 U/L — SIGNIFICANT CHANGE UP (ref 0–41)
AST SERPL-CCNC: 33 U/L — SIGNIFICANT CHANGE UP (ref 0–41)
AST SERPL-CCNC: 39 U/L — SIGNIFICANT CHANGE UP (ref 0–41)
BACTERIA # UR AUTO: ABNORMAL /HPF
BASE EXCESS BLDV CALC-SCNC: 11.3 MMOL/L — HIGH (ref -2–3)
BASE EXCESS BLDV CALC-SCNC: 8.1 MMOL/L — HIGH (ref -2–3)
BASE EXCESS BLDV CALC-SCNC: 8.6 MMOL/L — HIGH (ref -2–3)
BASOPHILS # BLD AUTO: 0.01 K/UL — SIGNIFICANT CHANGE UP (ref 0–0.2)
BASOPHILS # BLD AUTO: 0.04 K/UL — SIGNIFICANT CHANGE UP (ref 0–0.2)
BASOPHILS # BLD AUTO: 0.11 K/UL — SIGNIFICANT CHANGE UP (ref 0–0.2)
BASOPHILS # BLD AUTO: 0.12 K/UL — SIGNIFICANT CHANGE UP (ref 0–0.2)
BASOPHILS NFR BLD AUTO: 0.1 % — SIGNIFICANT CHANGE UP (ref 0–1)
BASOPHILS NFR BLD AUTO: 0.2 % — SIGNIFICANT CHANGE UP (ref 0–1)
BASOPHILS NFR BLD AUTO: 1.1 % — HIGH (ref 0–1)
BASOPHILS NFR BLD AUTO: 1.2 % — HIGH (ref 0–1)
BILIRUB SERPL-MCNC: 0.3 MG/DL — SIGNIFICANT CHANGE UP (ref 0.2–1.2)
BILIRUB SERPL-MCNC: 0.4 MG/DL — SIGNIFICANT CHANGE UP (ref 0.2–1.2)
BILIRUB UR-MCNC: NEGATIVE — SIGNIFICANT CHANGE UP
BUN SERPL-MCNC: 21 MG/DL — HIGH (ref 10–20)
BUN SERPL-MCNC: 25 MG/DL — HIGH (ref 10–20)
BUN SERPL-MCNC: 27 MG/DL — HIGH (ref 10–20)
BUN SERPL-MCNC: 27 MG/DL — HIGH (ref 10–20)
BUN SERPL-MCNC: 28 MG/DL — HIGH (ref 10–20)
BUN SERPL-MCNC: 31 MG/DL — HIGH (ref 10–20)
CA-I SERPL-SCNC: 1.2 MMOL/L — SIGNIFICANT CHANGE UP (ref 1.15–1.33)
CA-I SERPL-SCNC: 1.27 MMOL/L — SIGNIFICANT CHANGE UP (ref 1.15–1.33)
CA-I SERPL-SCNC: 1.28 MMOL/L — SIGNIFICANT CHANGE UP (ref 1.15–1.33)
CALCIUM SERPL-MCNC: 10 MG/DL — SIGNIFICANT CHANGE UP (ref 8.4–10.5)
CALCIUM SERPL-MCNC: 10.3 MG/DL — SIGNIFICANT CHANGE UP (ref 8.4–10.4)
CALCIUM SERPL-MCNC: 10.4 MG/DL — SIGNIFICANT CHANGE UP (ref 8.4–10.5)
CALCIUM SERPL-MCNC: 10.5 MG/DL — SIGNIFICANT CHANGE UP (ref 8.4–10.5)
CALCIUM SERPL-MCNC: 9.6 MG/DL — SIGNIFICANT CHANGE UP (ref 8.4–10.5)
CALCIUM SERPL-MCNC: 9.8 MG/DL — SIGNIFICANT CHANGE UP (ref 8.4–10.4)
CHLORIDE SERPL-SCNC: 100 MMOL/L — SIGNIFICANT CHANGE UP (ref 98–110)
CHLORIDE SERPL-SCNC: 101 MMOL/L — SIGNIFICANT CHANGE UP (ref 98–110)
CHLORIDE SERPL-SCNC: 101 MMOL/L — SIGNIFICANT CHANGE UP (ref 98–110)
CHLORIDE SERPL-SCNC: 102 MMOL/L — SIGNIFICANT CHANGE UP (ref 98–110)
CHLORIDE SERPL-SCNC: 103 MMOL/L — SIGNIFICANT CHANGE UP (ref 98–110)
CHLORIDE SERPL-SCNC: 97 MMOL/L — LOW (ref 98–110)
CHLORIDE SERPL-SCNC: 98 MMOL/L — SIGNIFICANT CHANGE UP (ref 98–110)
CHLORIDE SERPL-SCNC: 99 MMOL/L — SIGNIFICANT CHANGE UP (ref 98–110)
CO2 SERPL-SCNC: 29 MMOL/L — SIGNIFICANT CHANGE UP (ref 17–32)
CO2 SERPL-SCNC: 30 MMOL/L — SIGNIFICANT CHANGE UP (ref 17–32)
CO2 SERPL-SCNC: 31 MMOL/L — SIGNIFICANT CHANGE UP (ref 17–32)
CO2 SERPL-SCNC: 32 MMOL/L — SIGNIFICANT CHANGE UP (ref 17–32)
CO2 SERPL-SCNC: 33 MMOL/L — HIGH (ref 17–32)
CO2 SERPL-SCNC: 36 MMOL/L — HIGH (ref 17–32)
CO2 SERPL-SCNC: 38 MMOL/L — HIGH (ref 17–32)
CO2 SERPL-SCNC: 40 MMOL/L — HIGH (ref 17–32)
COLOR SPEC: YELLOW — SIGNIFICANT CHANGE UP
CREAT SERPL-MCNC: 0.6 MG/DL — LOW (ref 0.7–1.5)
CREAT SERPL-MCNC: 0.7 MG/DL — SIGNIFICANT CHANGE UP (ref 0.7–1.5)
CREAT SERPL-MCNC: 0.8 MG/DL — SIGNIFICANT CHANGE UP (ref 0.7–1.5)
CULTURE RESULTS: SIGNIFICANT CHANGE UP
D DIMER BLD IA.RAPID-MCNC: 211 NG/ML DDU — SIGNIFICANT CHANGE UP
DIFF PNL FLD: ABNORMAL
EGFR: 103 ML/MIN/1.73M2 — SIGNIFICANT CHANGE UP
EGFR: 94 ML/MIN/1.73M2 — SIGNIFICANT CHANGE UP
EGFR: 98 ML/MIN/1.73M2 — SIGNIFICANT CHANGE UP
EOSINOPHIL # BLD AUTO: 0 K/UL — SIGNIFICANT CHANGE UP (ref 0–0.7)
EOSINOPHIL # BLD AUTO: 0.01 K/UL — SIGNIFICANT CHANGE UP (ref 0–0.7)
EOSINOPHIL # BLD AUTO: 0.05 K/UL — SIGNIFICANT CHANGE UP (ref 0–0.7)
EOSINOPHIL # BLD AUTO: 0.05 K/UL — SIGNIFICANT CHANGE UP (ref 0–0.7)
EOSINOPHIL NFR BLD AUTO: 0 % — SIGNIFICANT CHANGE UP (ref 0–8)
EOSINOPHIL NFR BLD AUTO: 0 % — SIGNIFICANT CHANGE UP (ref 0–8)
EOSINOPHIL NFR BLD AUTO: 0.5 % — SIGNIFICANT CHANGE UP (ref 0–8)
EOSINOPHIL NFR BLD AUTO: 0.5 % — SIGNIFICANT CHANGE UP (ref 0–8)
EPI CELLS # UR: PRESENT
FLUAV AG NPH QL: SIGNIFICANT CHANGE UP
FLUBV AG NPH QL: SIGNIFICANT CHANGE UP
GAS PNL BLDV: 134 MMOL/L — LOW (ref 136–145)
GAS PNL BLDV: 142 MMOL/L — SIGNIFICANT CHANGE UP (ref 136–145)
GAS PNL BLDV: 142 MMOL/L — SIGNIFICANT CHANGE UP (ref 136–145)
GAS PNL BLDV: SIGNIFICANT CHANGE UP
GLUCOSE SERPL-MCNC: 101 MG/DL — HIGH (ref 70–99)
GLUCOSE SERPL-MCNC: 111 MG/DL — HIGH (ref 70–99)
GLUCOSE SERPL-MCNC: 115 MG/DL — HIGH (ref 70–99)
GLUCOSE SERPL-MCNC: 117 MG/DL — HIGH (ref 70–99)
GLUCOSE SERPL-MCNC: 127 MG/DL — HIGH (ref 70–99)
GLUCOSE SERPL-MCNC: 150 MG/DL — HIGH (ref 70–99)
GLUCOSE SERPL-MCNC: 158 MG/DL — HIGH (ref 70–99)
GLUCOSE SERPL-MCNC: 98 MG/DL — SIGNIFICANT CHANGE UP (ref 70–99)
GLUCOSE UR QL: NEGATIVE MG/DL — SIGNIFICANT CHANGE UP
HCO3 BLDV-SCNC: 34 MMOL/L — HIGH (ref 22–29)
HCO3 BLDV-SCNC: 41 MMOL/L — HIGH (ref 22–29)
HCO3 BLDV-SCNC: 43 MMOL/L — HIGH (ref 22–29)
HCT VFR BLD CALC: 35.9 % — LOW (ref 42–52)
HCT VFR BLD CALC: 36.3 % — LOW (ref 42–52)
HCT VFR BLD CALC: 38.5 % — LOW (ref 42–52)
HCT VFR BLD CALC: 40.1 % — LOW (ref 42–52)
HCT VFR BLD CALC: 40.2 % — LOW (ref 42–52)
HCT VFR BLD CALC: 44.9 % — SIGNIFICANT CHANGE UP (ref 42–52)
HCT VFR BLD CALC: 46.6 % — SIGNIFICANT CHANGE UP (ref 42–52)
HCT VFR BLDA CALC: 39 % — SIGNIFICANT CHANGE UP (ref 39–51)
HCT VFR BLDA CALC: 43 % — SIGNIFICANT CHANGE UP (ref 39–51)
HCT VFR BLDA CALC: 48 % — SIGNIFICANT CHANGE UP (ref 39–51)
HCV AB S/CO SERPL IA: 0.03 COI — SIGNIFICANT CHANGE UP
HCV AB SERPL-IMP: SIGNIFICANT CHANGE UP
HGB BLD CALC-MCNC: 12.9 G/DL — SIGNIFICANT CHANGE UP (ref 12.6–17.4)
HGB BLD CALC-MCNC: 14.4 G/DL — SIGNIFICANT CHANGE UP (ref 12.6–17.4)
HGB BLD CALC-MCNC: 16 G/DL — SIGNIFICANT CHANGE UP (ref 12.6–17.4)
HGB BLD-MCNC: 12.4 G/DL — LOW (ref 14–18)
HGB BLD-MCNC: 12.6 G/DL — LOW (ref 14–18)
HGB BLD-MCNC: 13 G/DL — LOW (ref 14–18)
HGB BLD-MCNC: 13.7 G/DL — LOW (ref 14–18)
HGB BLD-MCNC: 13.8 G/DL — LOW (ref 14–18)
HGB BLD-MCNC: 14.5 G/DL — SIGNIFICANT CHANGE UP (ref 14–18)
HGB BLD-MCNC: 15.6 G/DL — SIGNIFICANT CHANGE UP (ref 14–18)
IMM GRANULOCYTES NFR BLD AUTO: 0.2 % — SIGNIFICANT CHANGE UP (ref 0.1–0.3)
IMM GRANULOCYTES NFR BLD AUTO: 0.3 % — SIGNIFICANT CHANGE UP (ref 0.1–0.3)
IMM GRANULOCYTES NFR BLD AUTO: 0.3 % — SIGNIFICANT CHANGE UP (ref 0.1–0.3)
IMM GRANULOCYTES NFR BLD AUTO: 0.5 % — HIGH (ref 0.1–0.3)
KETONES UR-MCNC: NEGATIVE MG/DL — SIGNIFICANT CHANGE UP
LACTATE BLDV-MCNC: 1.2 MMOL/L — SIGNIFICANT CHANGE UP (ref 0.5–2)
LACTATE BLDV-MCNC: 1.5 MMOL/L — SIGNIFICANT CHANGE UP (ref 0.5–2)
LACTATE BLDV-MCNC: 4.3 MMOL/L — CRITICAL HIGH (ref 0.5–2)
LACTATE SERPL-SCNC: 1.3 MMOL/L — SIGNIFICANT CHANGE UP (ref 0.7–2)
LACTATE SERPL-SCNC: 3.6 MMOL/L — HIGH (ref 0.7–2)
LEGIONELLA AG UR QL: NEGATIVE — SIGNIFICANT CHANGE UP
LEUKOCYTE ESTERASE UR-ACNC: NEGATIVE — SIGNIFICANT CHANGE UP
LYMPHOCYTES # BLD AUTO: 0.8 K/UL — LOW (ref 1.2–3.4)
LYMPHOCYTES # BLD AUTO: 1.02 K/UL — LOW (ref 1.2–3.4)
LYMPHOCYTES # BLD AUTO: 1.03 K/UL — LOW (ref 1.2–3.4)
LYMPHOCYTES # BLD AUTO: 1.8 K/UL — SIGNIFICANT CHANGE UP (ref 1.2–3.4)
LYMPHOCYTES # BLD AUTO: 11.5 % — LOW (ref 20.5–51.1)
LYMPHOCYTES # BLD AUTO: 18.2 % — LOW (ref 20.5–51.1)
LYMPHOCYTES # BLD AUTO: 3.9 % — LOW (ref 20.5–51.1)
LYMPHOCYTES # BLD AUTO: 9.9 % — LOW (ref 20.5–51.1)
MAGNESIUM SERPL-MCNC: 1.8 MG/DL — SIGNIFICANT CHANGE UP (ref 1.8–2.4)
MAGNESIUM SERPL-MCNC: 1.8 MG/DL — SIGNIFICANT CHANGE UP (ref 1.8–2.4)
MCHC RBC-ENTMCNC: 28.9 PG — SIGNIFICANT CHANGE UP (ref 27–31)
MCHC RBC-ENTMCNC: 29 PG — SIGNIFICANT CHANGE UP (ref 27–31)
MCHC RBC-ENTMCNC: 29.3 PG — SIGNIFICANT CHANGE UP (ref 27–31)
MCHC RBC-ENTMCNC: 29.3 PG — SIGNIFICANT CHANGE UP (ref 27–31)
MCHC RBC-ENTMCNC: 29.4 PG — SIGNIFICANT CHANGE UP (ref 27–31)
MCHC RBC-ENTMCNC: 29.4 PG — SIGNIFICANT CHANGE UP (ref 27–31)
MCHC RBC-ENTMCNC: 29.8 PG — SIGNIFICANT CHANGE UP (ref 27–31)
MCHC RBC-ENTMCNC: 32.3 G/DL — SIGNIFICANT CHANGE UP (ref 32–37)
MCHC RBC-ENTMCNC: 33.5 G/DL — SIGNIFICANT CHANGE UP (ref 32–37)
MCHC RBC-ENTMCNC: 33.8 G/DL — SIGNIFICANT CHANGE UP (ref 32–37)
MCHC RBC-ENTMCNC: 34.2 G/DL — SIGNIFICANT CHANGE UP (ref 32–37)
MCHC RBC-ENTMCNC: 34.2 G/DL — SIGNIFICANT CHANGE UP (ref 32–37)
MCHC RBC-ENTMCNC: 34.3 G/DL — SIGNIFICANT CHANGE UP (ref 32–37)
MCHC RBC-ENTMCNC: 35.1 G/DL — SIGNIFICANT CHANGE UP (ref 32–37)
MCV RBC AUTO: 84.5 FL — SIGNIFICANT CHANGE UP (ref 80–94)
MCV RBC AUTO: 84.6 FL — SIGNIFICANT CHANGE UP (ref 80–94)
MCV RBC AUTO: 84.9 FL — SIGNIFICANT CHANGE UP (ref 80–94)
MCV RBC AUTO: 86.1 FL — SIGNIFICANT CHANGE UP (ref 80–94)
MCV RBC AUTO: 86.7 FL — SIGNIFICANT CHANGE UP (ref 80–94)
MCV RBC AUTO: 87.4 FL — SIGNIFICANT CHANGE UP (ref 80–94)
MCV RBC AUTO: 90.9 FL — SIGNIFICANT CHANGE UP (ref 80–94)
MONOCYTES # BLD AUTO: 0.7 K/UL — HIGH (ref 0.1–0.6)
MONOCYTES # BLD AUTO: 0.78 K/UL — HIGH (ref 0.1–0.6)
MONOCYTES # BLD AUTO: 0.89 K/UL — HIGH (ref 0.1–0.6)
MONOCYTES # BLD AUTO: 1.04 K/UL — HIGH (ref 0.1–0.6)
MONOCYTES NFR BLD AUTO: 10.5 % — HIGH (ref 1.7–9.3)
MONOCYTES NFR BLD AUTO: 3.8 % — SIGNIFICANT CHANGE UP (ref 1.7–9.3)
MONOCYTES NFR BLD AUTO: 7.8 % — SIGNIFICANT CHANGE UP (ref 1.7–9.3)
MONOCYTES NFR BLD AUTO: 8.6 % — SIGNIFICANT CHANGE UP (ref 1.7–9.3)
MRSA PCR RESULT.: NEGATIVE — SIGNIFICANT CHANGE UP
NEUTROPHILS # BLD AUTO: 18.7 K/UL — HIGH (ref 1.4–6.5)
NEUTROPHILS # BLD AUTO: 6.84 K/UL — HIGH (ref 1.4–6.5)
NEUTROPHILS # BLD AUTO: 7.18 K/UL — HIGH (ref 1.4–6.5)
NEUTROPHILS # BLD AUTO: 8.26 K/UL — HIGH (ref 1.4–6.5)
NEUTROPHILS NFR BLD AUTO: 69.3 % — SIGNIFICANT CHANGE UP (ref 42.2–75.2)
NEUTROPHILS NFR BLD AUTO: 79.7 % — HIGH (ref 42.2–75.2)
NEUTROPHILS NFR BLD AUTO: 80.3 % — HIGH (ref 42.2–75.2)
NEUTROPHILS NFR BLD AUTO: 91.6 % — HIGH (ref 42.2–75.2)
NITRITE UR-MCNC: NEGATIVE — SIGNIFICANT CHANGE UP
NRBC # BLD: 0 /100 WBCS — SIGNIFICANT CHANGE UP (ref 0–0)
NT-PROBNP SERPL-SCNC: 156 PG/ML — SIGNIFICANT CHANGE UP (ref 0–300)
NT-PROBNP SERPL-SCNC: 81 PG/ML — SIGNIFICANT CHANGE UP (ref 0–300)
PCO2 BLDV: 114 MMHG — HIGH (ref 42–55)
PCO2 BLDV: 51 MMHG — SIGNIFICANT CHANGE UP (ref 42–55)
PCO2 BLDV: 80 MMHG — HIGH (ref 42–55)
PH BLDV: 7.18 — CRITICAL LOW (ref 7.32–7.43)
PH BLDV: 7.32 — SIGNIFICANT CHANGE UP (ref 7.32–7.43)
PH BLDV: 7.43 — SIGNIFICANT CHANGE UP (ref 7.32–7.43)
PH UR: 5.5 — SIGNIFICANT CHANGE UP (ref 5–8)
PHOSPHATE SERPL-MCNC: 5.3 MG/DL — HIGH (ref 2.1–4.9)
PLATELET # BLD AUTO: 257 K/UL — SIGNIFICANT CHANGE UP (ref 130–400)
PLATELET # BLD AUTO: 277 K/UL — SIGNIFICANT CHANGE UP (ref 130–400)
PLATELET # BLD AUTO: 279 K/UL — SIGNIFICANT CHANGE UP (ref 130–400)
PLATELET # BLD AUTO: 295 K/UL — SIGNIFICANT CHANGE UP (ref 130–400)
PLATELET # BLD AUTO: 297 K/UL — SIGNIFICANT CHANGE UP (ref 130–400)
PLATELET # BLD AUTO: 410 K/UL — HIGH (ref 130–400)
PLATELET # BLD AUTO: 416 K/UL — HIGH (ref 130–400)
PMV BLD: 11.5 FL — HIGH (ref 7.4–10.4)
PMV BLD: 11.6 FL — HIGH (ref 7.4–10.4)
PMV BLD: 11.7 FL — HIGH (ref 7.4–10.4)
PMV BLD: 11.9 FL — HIGH (ref 7.4–10.4)
PMV BLD: 12 FL — HIGH (ref 7.4–10.4)
PMV BLD: 12.1 FL — HIGH (ref 7.4–10.4)
PMV BLD: 12.2 FL — HIGH (ref 7.4–10.4)
PO2 BLDV: 38 MMHG — SIGNIFICANT CHANGE UP (ref 25–45)
PO2 BLDV: 50 MMHG — HIGH (ref 25–45)
PO2 BLDV: 76 MMHG — HIGH (ref 25–45)
POTASSIUM BLDV-SCNC: 3.4 MMOL/L — LOW (ref 3.5–5.1)
POTASSIUM BLDV-SCNC: 3.6 MMOL/L — SIGNIFICANT CHANGE UP (ref 3.5–5.1)
POTASSIUM BLDV-SCNC: 4.2 MMOL/L — SIGNIFICANT CHANGE UP (ref 3.5–5.1)
POTASSIUM SERPL-MCNC: 3.4 MMOL/L — LOW (ref 3.5–5)
POTASSIUM SERPL-MCNC: 3.5 MMOL/L — SIGNIFICANT CHANGE UP (ref 3.5–5)
POTASSIUM SERPL-MCNC: 3.6 MMOL/L — SIGNIFICANT CHANGE UP (ref 3.5–5)
POTASSIUM SERPL-MCNC: 4.1 MMOL/L — SIGNIFICANT CHANGE UP (ref 3.5–5)
POTASSIUM SERPL-MCNC: 4.2 MMOL/L — SIGNIFICANT CHANGE UP (ref 3.5–5)
POTASSIUM SERPL-MCNC: 4.3 MMOL/L — SIGNIFICANT CHANGE UP (ref 3.5–5)
POTASSIUM SERPL-SCNC: 3.4 MMOL/L — LOW (ref 3.5–5)
POTASSIUM SERPL-SCNC: 3.5 MMOL/L — SIGNIFICANT CHANGE UP (ref 3.5–5)
POTASSIUM SERPL-SCNC: 3.6 MMOL/L — SIGNIFICANT CHANGE UP (ref 3.5–5)
POTASSIUM SERPL-SCNC: 4.1 MMOL/L — SIGNIFICANT CHANGE UP (ref 3.5–5)
POTASSIUM SERPL-SCNC: 4.2 MMOL/L — SIGNIFICANT CHANGE UP (ref 3.5–5)
POTASSIUM SERPL-SCNC: 4.3 MMOL/L — SIGNIFICANT CHANGE UP (ref 3.5–5)
PROCALCITONIN SERPL-MCNC: 0.08 NG/ML — SIGNIFICANT CHANGE UP (ref 0.02–0.1)
PROT SERPL-MCNC: 6.5 G/DL — SIGNIFICANT CHANGE UP (ref 6–8)
PROT SERPL-MCNC: 6.7 G/DL — SIGNIFICANT CHANGE UP (ref 6–8)
PROT SERPL-MCNC: 6.9 G/DL — SIGNIFICANT CHANGE UP (ref 6–8)
PROT SERPL-MCNC: 7.6 G/DL — SIGNIFICANT CHANGE UP (ref 6–8)
PROT UR-MCNC: 30 MG/DL
RAPID RVP RESULT: SIGNIFICANT CHANGE UP
RBC # BLD: 4.23 M/UL — LOW (ref 4.7–6.1)
RBC # BLD: 4.29 M/UL — LOW (ref 4.7–6.1)
RBC # BLD: 4.44 M/UL — LOW (ref 4.7–6.1)
RBC # BLD: 4.66 M/UL — LOW (ref 4.7–6.1)
RBC # BLD: 4.76 M/UL — SIGNIFICANT CHANGE UP (ref 4.7–6.1)
RBC # BLD: 4.94 M/UL — SIGNIFICANT CHANGE UP (ref 4.7–6.1)
RBC # BLD: 5.33 M/UL — SIGNIFICANT CHANGE UP (ref 4.7–6.1)
RBC # FLD: 13.4 % — SIGNIFICANT CHANGE UP (ref 11.5–14.5)
RBC # FLD: 13.6 % — SIGNIFICANT CHANGE UP (ref 11.5–14.5)
RBC # FLD: 13.6 % — SIGNIFICANT CHANGE UP (ref 11.5–14.5)
RBC # FLD: 13.7 % — SIGNIFICANT CHANGE UP (ref 11.5–14.5)
RBC # FLD: 13.7 % — SIGNIFICANT CHANGE UP (ref 11.5–14.5)
RBC # FLD: 13.9 % — SIGNIFICANT CHANGE UP (ref 11.5–14.5)
RBC # FLD: 14.1 % — SIGNIFICANT CHANGE UP (ref 11.5–14.5)
RBC CASTS # UR COMP ASSIST: 10 /HPF — HIGH (ref 0–4)
RSV RNA NPH QL NAA+NON-PROBE: SIGNIFICANT CHANGE UP
S PNEUM AG UR QL: NEGATIVE — SIGNIFICANT CHANGE UP
SAO2 % BLDV: 54.3 % — LOW (ref 67–88)
SAO2 % BLDV: 84.2 % — SIGNIFICANT CHANGE UP (ref 67–88)
SAO2 % BLDV: 95.6 % — HIGH (ref 67–88)
SARS-COV-2 RNA SPEC QL NAA+PROBE: SIGNIFICANT CHANGE UP
SARS-COV-2 RNA SPEC QL NAA+PROBE: SIGNIFICANT CHANGE UP
SODIUM SERPL-SCNC: 140 MMOL/L — SIGNIFICANT CHANGE UP (ref 135–146)
SODIUM SERPL-SCNC: 140 MMOL/L — SIGNIFICANT CHANGE UP (ref 135–146)
SODIUM SERPL-SCNC: 141 MMOL/L — SIGNIFICANT CHANGE UP (ref 135–146)
SODIUM SERPL-SCNC: 144 MMOL/L — SIGNIFICANT CHANGE UP (ref 135–146)
SODIUM SERPL-SCNC: 146 MMOL/L — SIGNIFICANT CHANGE UP (ref 135–146)
SODIUM SERPL-SCNC: 146 MMOL/L — SIGNIFICANT CHANGE UP (ref 135–146)
SODIUM SERPL-SCNC: 149 MMOL/L — HIGH (ref 135–146)
SODIUM SERPL-SCNC: 150 MMOL/L — HIGH (ref 135–146)
SP GR SPEC: 1.02 — SIGNIFICANT CHANGE UP (ref 1–1.03)
SPECIMEN SOURCE: SIGNIFICANT CHANGE UP
SQUAMOUS # UR AUTO: 8 /HPF — HIGH (ref 0–5)
T3 SERPL-MCNC: 92 NG/DL — SIGNIFICANT CHANGE UP (ref 80–200)
T4 FREE SERPL-MCNC: 1.4 NG/DL — SIGNIFICANT CHANGE UP (ref 0.9–1.8)
T4 FREE+ TSH PNL SERPL: 1.02 UIU/ML — SIGNIFICANT CHANGE UP (ref 0.27–4.2)
TROPONIN SAMPLING TIME: SIGNIFICANT CHANGE UP
TROPONIN T, HIGH SENSITIVITY RESULT: 27 NG/L — HIGH (ref 6–21)
TROPONIN T, HIGH SENSITIVITY RESULT: 28 NG/L — HIGH (ref 6–21)
TROPONIN T, HIGH SENSITIVITY RESULT: 29 NG/L — HIGH (ref 6–21)
TSH SERPL-MCNC: 0.97 UIU/ML — SIGNIFICANT CHANGE UP (ref 0.27–4.2)
UROBILINOGEN FLD QL: 1 MG/DL — SIGNIFICANT CHANGE UP (ref 0.2–1)
VIT D25+D1,25 OH+D1,25 PNL SERPL-MCNC: 35.8 PG/ML — SIGNIFICANT CHANGE UP (ref 19.9–79.3)
WBC # BLD: 10.35 K/UL — SIGNIFICANT CHANGE UP (ref 4.8–10.8)
WBC # BLD: 20.43 K/UL — HIGH (ref 4.8–10.8)
WBC # BLD: 22.77 K/UL — HIGH (ref 4.8–10.8)
WBC # BLD: 7.6 K/UL — SIGNIFICANT CHANGE UP (ref 4.8–10.8)
WBC # BLD: 8.95 K/UL — SIGNIFICANT CHANGE UP (ref 4.8–10.8)
WBC # BLD: 9.43 K/UL — SIGNIFICANT CHANGE UP (ref 4.8–10.8)
WBC # BLD: 9.88 K/UL — SIGNIFICANT CHANGE UP (ref 4.8–10.8)
WBC # FLD AUTO: 10.35 K/UL — SIGNIFICANT CHANGE UP (ref 4.8–10.8)
WBC # FLD AUTO: 20.43 K/UL — HIGH (ref 4.8–10.8)
WBC # FLD AUTO: 22.77 K/UL — HIGH (ref 4.8–10.8)
WBC # FLD AUTO: 7.6 K/UL — SIGNIFICANT CHANGE UP (ref 4.8–10.8)
WBC # FLD AUTO: 8.95 K/UL — SIGNIFICANT CHANGE UP (ref 4.8–10.8)
WBC # FLD AUTO: 9.43 K/UL — SIGNIFICANT CHANGE UP (ref 4.8–10.8)
WBC # FLD AUTO: 9.88 K/UL — SIGNIFICANT CHANGE UP (ref 4.8–10.8)
WBC UR QL: 2 /HPF — SIGNIFICANT CHANGE UP (ref 0–5)

## 2024-01-01 PROCEDURE — 31231 NASAL ENDOSCOPY DX: CPT | Mod: 58

## 2024-01-01 PROCEDURE — 74177 CT ABD & PELVIS W/CONTRAST: CPT | Mod: 26

## 2024-01-01 PROCEDURE — 36415 COLL VENOUS BLD VENIPUNCTURE: CPT

## 2024-01-01 PROCEDURE — 97110 THERAPEUTIC EXERCISES: CPT | Mod: GP

## 2024-01-01 PROCEDURE — 85379 FIBRIN DEGRADATION QUANT: CPT

## 2024-01-01 PROCEDURE — 93010 ELECTROCARDIOGRAM REPORT: CPT

## 2024-01-01 PROCEDURE — 85027 COMPLETE CBC AUTOMATED: CPT

## 2024-01-01 PROCEDURE — 71250 CT THORAX DX C-: CPT | Mod: MC

## 2024-01-01 PROCEDURE — 99223 1ST HOSP IP/OBS HIGH 75: CPT

## 2024-01-01 PROCEDURE — 99232 SBSQ HOSP IP/OBS MODERATE 35: CPT

## 2024-01-01 PROCEDURE — 71045 X-RAY EXAM CHEST 1 VIEW: CPT

## 2024-01-01 PROCEDURE — 84145 PROCALCITONIN (PCT): CPT

## 2024-01-01 PROCEDURE — 30117 REMOVAL OF INTRANASAL LESION: CPT

## 2024-01-01 PROCEDURE — G2211 COMPLEX E/M VISIT ADD ON: CPT

## 2024-01-01 PROCEDURE — 87449 NOS EACH ORGANISM AG IA: CPT

## 2024-01-01 PROCEDURE — 99233 SBSQ HOSP IP/OBS HIGH 50: CPT

## 2024-01-01 PROCEDURE — 93005 ELECTROCARDIOGRAM TRACING: CPT

## 2024-01-01 PROCEDURE — G0296 VISIT TO DETERM LDCT ELIG: CPT

## 2024-01-01 PROCEDURE — 94640 AIRWAY INHALATION TREATMENT: CPT

## 2024-01-01 PROCEDURE — V5299A: CUSTOM

## 2024-01-01 PROCEDURE — 99291 CRITICAL CARE FIRST HOUR: CPT | Mod: FS

## 2024-01-01 PROCEDURE — 71045 X-RAY EXAM CHEST 1 VIEW: CPT | Mod: 26

## 2024-01-01 PROCEDURE — 99024 POSTOP FOLLOW-UP VISIT: CPT

## 2024-01-01 PROCEDURE — 93306 TTE W/DOPPLER COMPLETE: CPT | Mod: 26

## 2024-01-01 PROCEDURE — 84100 ASSAY OF PHOSPHORUS: CPT

## 2024-01-01 PROCEDURE — 85025 COMPLETE CBC W/AUTO DIFF WBC: CPT

## 2024-01-01 PROCEDURE — 99214 OFFICE O/P EST MOD 30 MIN: CPT | Mod: 25

## 2024-01-01 PROCEDURE — 82652 VIT D 1 25-DIHYDROXY: CPT

## 2024-01-01 PROCEDURE — 86803 HEPATITIS C AB TEST: CPT

## 2024-01-01 PROCEDURE — 84443 ASSAY THYROID STIM HORMONE: CPT

## 2024-01-01 PROCEDURE — 99239 HOSP IP/OBS DSCHRG MGMT >30: CPT

## 2024-01-01 PROCEDURE — 70450 CT HEAD/BRAIN W/O DYE: CPT | Mod: 26,MC

## 2024-01-01 PROCEDURE — 99213 OFFICE O/P EST LOW 20 MIN: CPT

## 2024-01-01 PROCEDURE — 84439 ASSAY OF FREE THYROXINE: CPT

## 2024-01-01 PROCEDURE — 99285 EMERGENCY DEPT VISIT HI MDM: CPT | Mod: GC

## 2024-01-01 PROCEDURE — 99222 1ST HOSP IP/OBS MODERATE 55: CPT | Mod: AI

## 2024-01-01 PROCEDURE — 97162 PT EVAL MOD COMPLEX 30 MIN: CPT | Mod: GP

## 2024-01-01 PROCEDURE — 71250 CT THORAX DX C-: CPT | Mod: 26

## 2024-01-01 PROCEDURE — 84480 ASSAY TRIIODOTHYRONINE (T3): CPT

## 2024-01-01 PROCEDURE — 31242Z: CUSTOM

## 2024-01-01 PROCEDURE — 87640 STAPH A DNA AMP PROBE: CPT

## 2024-01-01 PROCEDURE — 80053 COMPREHEN METABOLIC PANEL: CPT

## 2024-01-01 PROCEDURE — 97116 GAIT TRAINING THERAPY: CPT | Mod: GP

## 2024-01-01 PROCEDURE — C9113: CPT

## 2024-01-01 PROCEDURE — 80048 BASIC METABOLIC PNL TOTAL CA: CPT

## 2024-01-01 PROCEDURE — 92610 EVALUATE SWALLOWING FUNCTION: CPT | Mod: GN

## 2024-01-01 PROCEDURE — 74177 CT ABD & PELVIS W/CONTRAST: CPT | Mod: MC

## 2024-01-01 PROCEDURE — 71271 CT THORAX LUNG CANCER SCR C-: CPT | Mod: 26

## 2024-01-01 PROCEDURE — 99215 OFFICE O/P EST HI 40 MIN: CPT

## 2024-01-01 PROCEDURE — 0225U NFCT DS DNA&RNA 21 SARSCOV2: CPT

## 2024-01-01 PROCEDURE — 71271 CT THORAX LUNG CANCER SCR C-: CPT

## 2024-01-01 PROCEDURE — 87899 AGENT NOS ASSAY W/OPTIC: CPT

## 2024-01-01 PROCEDURE — 83735 ASSAY OF MAGNESIUM: CPT

## 2024-01-01 PROCEDURE — 87040 BLOOD CULTURE FOR BACTERIA: CPT

## 2024-01-01 PROCEDURE — 99496 TRANSJ CARE MGMT HIGH F2F 7D: CPT

## 2024-01-01 PROCEDURE — 93306 TTE W/DOPPLER COMPLETE: CPT

## 2024-01-01 PROCEDURE — 87641 MR-STAPH DNA AMP PROBE: CPT

## 2024-01-01 PROCEDURE — 94660 CPAP INITIATION&MGMT: CPT

## 2024-01-01 RX ORDER — RILUZOLE 50 MG/1
50 TABLET ORAL
Refills: 0 | Status: DISCONTINUED | OUTPATIENT
Start: 2024-01-01 | End: 2024-01-01

## 2024-01-01 RX ORDER — AZITHROMYCIN 500 MG/1
500 TABLET, FILM COATED ORAL DAILY
Refills: 0 | Status: DISCONTINUED | OUTPATIENT
Start: 2024-01-01 | End: 2024-01-01

## 2024-01-01 RX ORDER — ALBUTEROL 90 UG/1
2 AEROSOL, METERED ORAL EVERY 4 HOURS
Refills: 0 | Status: DISCONTINUED | OUTPATIENT
Start: 2024-01-01 | End: 2024-01-01

## 2024-01-01 RX ORDER — CEFPODOXIME PROXETIL 100 MG
200 TABLET ORAL EVERY 12 HOURS
Refills: 0 | Status: DISCONTINUED | OUTPATIENT
Start: 2024-01-01 | End: 2024-01-01

## 2024-01-01 RX ORDER — LORATADINE 10 MG/1
10 TABLET ORAL DAILY
Refills: 0 | Status: DISCONTINUED | OUTPATIENT
Start: 2024-01-01 | End: 2024-01-01

## 2024-01-01 RX ORDER — PANTOPRAZOLE SODIUM 20 MG/1
40 TABLET, DELAYED RELEASE ORAL
Refills: 0 | Status: DISCONTINUED | OUTPATIENT
Start: 2024-01-01 | End: 2024-01-01

## 2024-01-01 RX ORDER — CHLORHEXIDINE GLUCONATE 213 G/1000ML
1 SOLUTION TOPICAL
Refills: 0 | Status: DISCONTINUED | OUTPATIENT
Start: 2024-01-01 | End: 2024-01-01

## 2024-01-01 RX ORDER — ESOMEPRAZOLE MAGNESIUM 40 MG/1
1 CAPSULE, DELAYED RELEASE ORAL
Refills: 0 | DISCHARGE

## 2024-01-01 RX ORDER — MIRABEGRON 50 MG/1
1 TABLET, EXTENDED RELEASE ORAL
Refills: 0 | DISCHARGE

## 2024-01-01 RX ORDER — EDARAVONE 105 MG/5ML
105 KIT ORAL
Qty: 2 | Refills: 11 | Status: ACTIVE | COMMUNITY
Start: 2022-11-04 | End: 1900-01-01

## 2024-01-01 RX ORDER — ACETAMINOPHEN 500 MG
650 TABLET ORAL EVERY 6 HOURS
Refills: 0 | Status: DISCONTINUED | OUTPATIENT
Start: 2024-01-01 | End: 2024-01-01

## 2024-01-01 RX ORDER — LEVOCETIRIZINE DIHYDROCHLORIDE 5 MG/1
5 TABLET ORAL DAILY
Qty: 90 | Refills: 3 | Status: ACTIVE | COMMUNITY
Start: 2024-01-01 | End: 1900-01-01

## 2024-01-01 RX ORDER — CEFTRIAXONE 500 MG/1
1000 INJECTION, POWDER, FOR SOLUTION INTRAMUSCULAR; INTRAVENOUS EVERY 24 HOURS
Refills: 0 | Status: DISCONTINUED | OUTPATIENT
Start: 2024-01-01 | End: 2024-01-01

## 2024-01-01 RX ORDER — TADALAFIL 10 MG/1
1 TABLET, FILM COATED ORAL
Refills: 0 | DISCHARGE

## 2024-01-01 RX ORDER — CEFTRIAXONE 500 MG/1
1000 INJECTION, POWDER, FOR SOLUTION INTRAMUSCULAR; INTRAVENOUS ONCE
Refills: 0 | Status: COMPLETED | OUTPATIENT
Start: 2024-01-01 | End: 2024-01-01

## 2024-01-01 RX ORDER — LANOLIN ALCOHOL/MO/W.PET/CERES
3 CREAM (GRAM) TOPICAL AT BEDTIME
Refills: 0 | Status: DISCONTINUED | OUTPATIENT
Start: 2024-01-01 | End: 2024-01-01

## 2024-01-01 RX ORDER — IPRATROPIUM/ALBUTEROL SULFATE 18-103MCG
3 AEROSOL WITH ADAPTER (GRAM) INHALATION EVERY 6 HOURS
Refills: 0 | Status: DISCONTINUED | OUTPATIENT
Start: 2024-01-01 | End: 2024-01-01

## 2024-01-01 RX ORDER — EDARAVONE 105 MG/5ML
5 KIT ORAL
Refills: 0 | DISCHARGE

## 2024-01-01 RX ORDER — PANTOPRAZOLE SODIUM 20 MG/1
40 TABLET, DELAYED RELEASE ORAL EVERY 24 HOURS
Refills: 0 | Status: DISCONTINUED | OUTPATIENT
Start: 2024-01-01 | End: 2024-01-01

## 2024-01-01 RX ORDER — ALBUTEROL SULFATE 90 UG/1
108 (90 BASE) INHALANT RESPIRATORY (INHALATION)
Qty: 1 | Refills: 11 | Status: ACTIVE | COMMUNITY
Start: 2023-01-01 | End: 1900-01-01

## 2024-01-01 RX ORDER — AZITHROMYCIN 500 MG/1
500 TABLET, FILM COATED ORAL EVERY 24 HOURS
Refills: 0 | Status: COMPLETED | OUTPATIENT
Start: 2024-01-01 | End: 2024-01-01

## 2024-01-01 RX ORDER — RILUZOLE 50 MG/1
1 TABLET ORAL
Refills: 0 | DISCHARGE

## 2024-01-01 RX ORDER — GABAPENTIN 400 MG/1
300 CAPSULE ORAL AT BEDTIME
Refills: 0 | Status: DISCONTINUED | OUTPATIENT
Start: 2024-01-01 | End: 2024-01-01

## 2024-01-01 RX ORDER — OXYCODONE 5 MG/1
5 TABLET ORAL
Qty: 20 | Refills: 0 | Status: DISCONTINUED | COMMUNITY
Start: 2023-01-01 | End: 2024-01-01

## 2024-01-01 RX ORDER — DEXTROMETHORPHAN HYDROBROMIDE AND QUINIDINE SULFATE 20; 10 MG/1; MG/1
20-10 CAPSULE, GELATIN COATED ORAL TWICE DAILY
Qty: 60 | Refills: 2 | Status: ACTIVE | COMMUNITY
Start: 2024-01-01 | End: 1900-01-01

## 2024-01-01 RX ORDER — CEFEPIME 1 G/1
1000 INJECTION, POWDER, FOR SOLUTION INTRAMUSCULAR; INTRAVENOUS EVERY 12 HOURS
Refills: 0 | Status: DISCONTINUED | OUTPATIENT
Start: 2024-01-01 | End: 2024-01-01

## 2024-01-01 RX ORDER — TAMSULOSIN HYDROCHLORIDE 0.4 MG/1
0.4 CAPSULE ORAL AT BEDTIME
Refills: 0 | Status: DISCONTINUED | OUTPATIENT
Start: 2024-01-01 | End: 2024-01-01

## 2024-01-01 RX ORDER — GABAPENTIN 300 MG/1
300 CAPSULE ORAL
Qty: 30 | Refills: 3 | Status: ACTIVE | COMMUNITY
Start: 2024-01-01 | End: 1900-01-01

## 2024-01-01 RX ORDER — SODIUM CHLORIDE 9 MG/ML
1000 INJECTION, SOLUTION INTRAVENOUS
Refills: 0 | Status: DISCONTINUED | OUTPATIENT
Start: 2024-01-01 | End: 2024-01-01

## 2024-01-01 RX ORDER — POTASSIUM CHLORIDE 20 MEQ
40 PACKET (EA) ORAL ONCE
Refills: 0 | Status: COMPLETED | OUTPATIENT
Start: 2024-01-01 | End: 2024-01-01

## 2024-01-01 RX ORDER — CEFPODOXIME PROXETIL 100 MG
1 TABLET ORAL
Qty: 2 | Refills: 0
Start: 2024-01-01 | End: 2024-01-01

## 2024-01-01 RX ORDER — CEFEPIME 1 G/1
1000 INJECTION, POWDER, FOR SOLUTION INTRAMUSCULAR; INTRAVENOUS ONCE
Refills: 0 | Status: COMPLETED | OUTPATIENT
Start: 2024-01-01 | End: 2024-01-01

## 2024-01-01 RX ORDER — IOHEXOL 300 MG/ML
30 INJECTION, SOLUTION INTRAVENOUS ONCE
Refills: 0 | Status: COMPLETED | OUTPATIENT
Start: 2024-01-01 | End: 2024-01-01

## 2024-01-01 RX ORDER — LEVOCETIRIZINE DIHYDROCHLORIDE 0.5 MG/ML
1 SOLUTION ORAL
Refills: 0 | DISCHARGE

## 2024-01-01 RX ORDER — IPRATROPIUM BROMIDE 21 UG/1
0.03 SPRAY NASAL 3 TIMES DAILY
Qty: 1 | Refills: 6 | Status: ACTIVE | COMMUNITY
Start: 2023-04-06 | End: 1900-01-01

## 2024-01-01 RX ORDER — MECLIZINE HYDROCHLORIDE 25 MG/1
25 TABLET ORAL 4 TIMES DAILY
Refills: 0 | Status: ACTIVE | COMMUNITY

## 2024-01-01 RX ORDER — AZITHROMYCIN 500 MG/1
500 TABLET, FILM COATED ORAL ONCE
Refills: 0 | Status: COMPLETED | OUTPATIENT
Start: 2024-01-01 | End: 2024-01-01

## 2024-01-01 RX ORDER — THIAMINE MONONITRATE (VIT B1) 100 MG
100 TABLET ORAL DAILY
Refills: 0 | Status: DISCONTINUED | OUTPATIENT
Start: 2024-01-01 | End: 2024-01-01

## 2024-01-01 RX ORDER — NAPROXEN 500 MG/1
TABLET ORAL
Refills: 0 | Status: ACTIVE | COMMUNITY

## 2024-01-01 RX ORDER — IPRATROPIUM BROMIDE 21 MCG
2 AEROSOL, SPRAY (ML) NASAL
Refills: 0 | DISCHARGE

## 2024-01-01 RX ORDER — ALBUTEROL 90 UG/1
2 AEROSOL, METERED ORAL
Refills: 0 | DISCHARGE

## 2024-01-01 RX ORDER — MIRTAZAPINE 15 MG/1
15 TABLET, FILM COATED ORAL
Qty: 30 | Refills: 5 | Status: DISCONTINUED | COMMUNITY
Start: 2023-01-01 | End: 2024-01-01

## 2024-01-01 RX ORDER — ONDANSETRON 8 MG/1
4 TABLET, FILM COATED ORAL EVERY 8 HOURS
Refills: 0 | Status: DISCONTINUED | OUTPATIENT
Start: 2024-01-01 | End: 2024-01-01

## 2024-01-01 RX ORDER — MORPHINE SULFATE 50 MG/1
2 CAPSULE, EXTENDED RELEASE ORAL
Refills: 0 | Status: DISCONTINUED | OUTPATIENT
Start: 2024-01-01 | End: 2024-01-01

## 2024-01-01 RX ORDER — ENOXAPARIN SODIUM 100 MG/ML
40 INJECTION SUBCUTANEOUS EVERY 24 HOURS
Refills: 0 | Status: DISCONTINUED | OUTPATIENT
Start: 2024-01-01 | End: 2024-01-01

## 2024-01-01 RX ORDER — IPRATROPIUM/ALBUTEROL SULFATE 18-103MCG
3 AEROSOL WITH ADAPTER (GRAM) INHALATION
Refills: 0 | Status: COMPLETED | OUTPATIENT
Start: 2024-01-01 | End: 2024-01-01

## 2024-01-01 RX ORDER — DEXTROMETHORPHAN HYDROBROMIDE AND QUINIDINE SULFATE 20; 10 MG/1; MG/1
1 CAPSULE, GELATIN COATED ORAL
Refills: 0 | DISCHARGE

## 2024-01-01 RX ORDER — CEFTRIAXONE 500 MG/1
1000 INJECTION, POWDER, FOR SOLUTION INTRAMUSCULAR; INTRAVENOUS EVERY 24 HOURS
Refills: 0 | Status: COMPLETED | OUTPATIENT
Start: 2024-01-01 | End: 2024-01-01

## 2024-01-01 RX ORDER — MONTELUKAST 4 MG/1
10 TABLET, CHEWABLE ORAL DAILY
Refills: 0 | Status: DISCONTINUED | OUTPATIENT
Start: 2024-01-01 | End: 2024-01-01

## 2024-01-01 RX ORDER — AZITHROMYCIN 500 MG/1
TABLET, FILM COATED ORAL
Refills: 0 | Status: COMPLETED | OUTPATIENT
Start: 2024-01-01 | End: 2024-01-01

## 2024-01-01 RX ORDER — TAMSULOSIN HYDROCHLORIDE 0.4 MG/1
1 CAPSULE ORAL
Refills: 0 | DISCHARGE

## 2024-01-01 RX ORDER — ALBUTEROL SULFATE 2.5 MG/3ML
(2.5 MG/3ML) SOLUTION RESPIRATORY (INHALATION)
Qty: 1 | Refills: 5 | Status: ACTIVE | COMMUNITY
Start: 2024-01-01 | End: 1900-01-01

## 2024-01-01 RX ORDER — VANCOMYCIN HCL 1 G
1000 VIAL (EA) INTRAVENOUS ONCE
Refills: 0 | Status: COMPLETED | OUTPATIENT
Start: 2024-01-01 | End: 2024-01-01

## 2024-01-01 RX ORDER — GABAPENTIN 400 MG/1
1 CAPSULE ORAL
Refills: 0 | DISCHARGE

## 2024-01-01 RX ADMIN — CEFTRIAXONE 100 MILLIGRAM(S): 500 INJECTION, POWDER, FOR SOLUTION INTRAMUSCULAR; INTRAVENOUS at 18:34

## 2024-01-01 RX ADMIN — RILUZOLE 50 MILLIGRAM(S): 50 TABLET ORAL at 21:12

## 2024-01-01 RX ADMIN — ENOXAPARIN SODIUM 40 MILLIGRAM(S): 100 INJECTION SUBCUTANEOUS at 21:50

## 2024-01-01 RX ADMIN — RILUZOLE 50 MILLIGRAM(S): 50 TABLET ORAL at 17:44

## 2024-01-01 RX ADMIN — CEFEPIME 100 MILLIGRAM(S): 1 INJECTION, POWDER, FOR SOLUTION INTRAMUSCULAR; INTRAVENOUS at 18:26

## 2024-01-01 RX ADMIN — Medication 40 MILLIGRAM(S): at 06:12

## 2024-01-01 RX ADMIN — LORATADINE 10 MILLIGRAM(S): 10 TABLET ORAL at 11:16

## 2024-01-01 RX ADMIN — RILUZOLE 50 MILLIGRAM(S): 50 TABLET ORAL at 18:21

## 2024-01-01 RX ADMIN — CEFEPIME 100 MILLIGRAM(S): 1 INJECTION, POWDER, FOR SOLUTION INTRAMUSCULAR; INTRAVENOUS at 09:47

## 2024-01-01 RX ADMIN — Medication 3 MILLILITER(S): at 12:15

## 2024-01-01 RX ADMIN — GABAPENTIN 300 MILLIGRAM(S): 400 CAPSULE ORAL at 21:52

## 2024-01-01 RX ADMIN — Medication 3 MILLILITER(S): at 07:20

## 2024-01-01 RX ADMIN — Medication 40 MILLIGRAM(S): at 05:38

## 2024-01-01 RX ADMIN — LORATADINE 10 MILLIGRAM(S): 10 TABLET ORAL at 11:39

## 2024-01-01 RX ADMIN — ENOXAPARIN SODIUM 40 MILLIGRAM(S): 100 INJECTION SUBCUTANEOUS at 21:31

## 2024-01-01 RX ADMIN — Medication 3 MILLILITER(S): at 12:20

## 2024-01-01 RX ADMIN — PANTOPRAZOLE SODIUM 40 MILLIGRAM(S): 20 TABLET, DELAYED RELEASE ORAL at 06:33

## 2024-01-01 RX ADMIN — Medication 3 MILLILITER(S): at 09:47

## 2024-01-01 RX ADMIN — Medication 100 MILLIGRAM(S): at 06:33

## 2024-01-01 RX ADMIN — CEFTRIAXONE 100 MILLIGRAM(S): 500 INJECTION, POWDER, FOR SOLUTION INTRAMUSCULAR; INTRAVENOUS at 18:19

## 2024-01-01 RX ADMIN — Medication 200 MILLIGRAM(S): at 06:12

## 2024-01-01 RX ADMIN — Medication 100 MILLIGRAM(S): at 17:50

## 2024-01-01 RX ADMIN — Medication 250 MILLIGRAM(S): at 09:47

## 2024-01-01 RX ADMIN — PANTOPRAZOLE SODIUM 40 MILLIGRAM(S): 20 TABLET, DELAYED RELEASE ORAL at 06:12

## 2024-01-01 RX ADMIN — RILUZOLE 50 MILLIGRAM(S): 50 TABLET ORAL at 06:12

## 2024-01-01 RX ADMIN — Medication 3 MILLILITER(S): at 19:54

## 2024-01-01 RX ADMIN — MORPHINE SULFATE 2 MILLIGRAM(S): 50 CAPSULE, EXTENDED RELEASE ORAL at 12:27

## 2024-01-01 RX ADMIN — RILUZOLE 50 MILLIGRAM(S): 50 TABLET ORAL at 05:39

## 2024-01-01 RX ADMIN — PANTOPRAZOLE SODIUM 40 MILLIGRAM(S): 20 TABLET, DELAYED RELEASE ORAL at 05:38

## 2024-01-01 RX ADMIN — RILUZOLE 50 MILLIGRAM(S): 50 TABLET ORAL at 18:19

## 2024-01-01 RX ADMIN — TAMSULOSIN HYDROCHLORIDE 0.4 MILLIGRAM(S): 0.4 CAPSULE ORAL at 21:49

## 2024-01-01 RX ADMIN — LORATADINE 10 MILLIGRAM(S): 10 TABLET ORAL at 13:24

## 2024-01-01 RX ADMIN — ENOXAPARIN SODIUM 40 MILLIGRAM(S): 100 INJECTION SUBCUTANEOUS at 21:12

## 2024-01-01 RX ADMIN — Medication 3 MILLIGRAM(S): at 21:48

## 2024-01-01 RX ADMIN — CEFEPIME 100 MILLIGRAM(S): 1 INJECTION, POWDER, FOR SOLUTION INTRAMUSCULAR; INTRAVENOUS at 05:24

## 2024-01-01 RX ADMIN — Medication 3 MILLILITER(S): at 11:39

## 2024-01-01 RX ADMIN — AZITHROMYCIN 255 MILLIGRAM(S): 500 TABLET, FILM COATED ORAL at 14:46

## 2024-01-01 RX ADMIN — IOHEXOL 30 MILLILITER(S): 300 INJECTION, SOLUTION INTRAVENOUS at 11:15

## 2024-01-01 RX ADMIN — Medication 125 MILLIGRAM(S): at 12:15

## 2024-01-01 RX ADMIN — TAMSULOSIN HYDROCHLORIDE 0.4 MILLIGRAM(S): 0.4 CAPSULE ORAL at 21:30

## 2024-01-01 RX ADMIN — AZITHROMYCIN 500 MILLIGRAM(S): 500 TABLET, FILM COATED ORAL at 11:39

## 2024-01-01 RX ADMIN — Medication 3 MILLIGRAM(S): at 21:52

## 2024-01-01 RX ADMIN — CEFTRIAXONE 100 MILLIGRAM(S): 500 INJECTION, POWDER, FOR SOLUTION INTRAMUSCULAR; INTRAVENOUS at 14:29

## 2024-01-01 RX ADMIN — RILUZOLE 50 MILLIGRAM(S): 50 TABLET ORAL at 17:12

## 2024-01-01 RX ADMIN — Medication 3 MILLILITER(S): at 11:38

## 2024-01-01 RX ADMIN — ENOXAPARIN SODIUM 40 MILLIGRAM(S): 100 INJECTION SUBCUTANEOUS at 18:26

## 2024-01-01 RX ADMIN — PANTOPRAZOLE SODIUM 40 MILLIGRAM(S): 20 TABLET, DELAYED RELEASE ORAL at 05:47

## 2024-01-01 RX ADMIN — Medication 100 MILLIGRAM(S): at 05:46

## 2024-01-01 RX ADMIN — CEFTRIAXONE 100 MILLIGRAM(S): 500 INJECTION, POWDER, FOR SOLUTION INTRAMUSCULAR; INTRAVENOUS at 13:23

## 2024-01-01 RX ADMIN — TAMSULOSIN HYDROCHLORIDE 0.4 MILLIGRAM(S): 0.4 CAPSULE ORAL at 21:12

## 2024-01-01 RX ADMIN — ALBUTEROL 2 PUFF(S): 90 AEROSOL, METERED ORAL at 21:51

## 2024-01-01 RX ADMIN — Medication 40 MILLIGRAM(S): at 05:46

## 2024-01-01 RX ADMIN — Medication 3 MILLIGRAM(S): at 22:43

## 2024-01-01 RX ADMIN — Medication 100 MILLIGRAM(S): at 11:38

## 2024-01-01 RX ADMIN — Medication 40 MILLIGRAM(S): at 05:24

## 2024-01-01 RX ADMIN — RILUZOLE 50 MILLIGRAM(S): 50 TABLET ORAL at 06:33

## 2024-01-01 RX ADMIN — Medication 40 MILLIGRAM(S): at 18:26

## 2024-01-01 RX ADMIN — AZITHROMYCIN 255 MILLIGRAM(S): 500 TABLET, FILM COATED ORAL at 09:27

## 2024-01-01 RX ADMIN — GABAPENTIN 300 MILLIGRAM(S): 400 CAPSULE ORAL at 21:30

## 2024-01-01 RX ADMIN — PANTOPRAZOLE SODIUM 40 MILLIGRAM(S): 20 TABLET, DELAYED RELEASE ORAL at 18:26

## 2024-01-01 RX ADMIN — TAMSULOSIN HYDROCHLORIDE 0.4 MILLIGRAM(S): 0.4 CAPSULE ORAL at 21:51

## 2024-01-01 RX ADMIN — Medication 100 MILLIGRAM(S): at 16:29

## 2024-01-01 RX ADMIN — Medication 100 MILLIGRAM(S): at 16:10

## 2024-01-01 RX ADMIN — Medication 125 MILLIGRAM(S): at 09:48

## 2024-01-01 RX ADMIN — Medication 200 MILLIGRAM(S): at 17:12

## 2024-01-01 RX ADMIN — SODIUM CHLORIDE 75 MILLILITER(S): 9 INJECTION, SOLUTION INTRAVENOUS at 18:57

## 2024-01-01 RX ADMIN — AZITHROMYCIN 255 MILLIGRAM(S): 500 TABLET, FILM COATED ORAL at 11:08

## 2024-01-01 RX ADMIN — ENOXAPARIN SODIUM 40 MILLIGRAM(S): 100 INJECTION SUBCUTANEOUS at 21:48

## 2024-01-01 RX ADMIN — Medication 40 MILLIEQUIVALENT(S): at 16:09

## 2024-01-01 RX ADMIN — GABAPENTIN 300 MILLIGRAM(S): 400 CAPSULE ORAL at 21:48

## 2024-01-01 RX ADMIN — GABAPENTIN 300 MILLIGRAM(S): 400 CAPSULE ORAL at 21:12

## 2024-01-01 RX ADMIN — LORATADINE 10 MILLIGRAM(S): 10 TABLET ORAL at 11:06

## 2024-01-01 RX ADMIN — ALBUTEROL 2 PUFF(S): 90 AEROSOL, METERED ORAL at 22:01

## 2024-01-01 RX ADMIN — CHLORHEXIDINE GLUCONATE 1 APPLICATION(S): 213 SOLUTION TOPICAL at 05:25

## 2024-01-01 RX ADMIN — Medication 3 MILLILITER(S): at 12:26

## 2024-01-04 PROBLEM — H81.11 BENIGN PAROXYSMAL POSITIONAL VERTIGO OF RIGHT EAR: Status: ACTIVE | Noted: 2023-01-01

## 2024-01-04 NOTE — HISTORY OF PRESENT ILLNESS
[FreeTextEntry1] : Patient returns today c/o allergic rhinitis, chronic vasomotor rhinitis, Benign paroxysmal positional vertigo of right ear. Vestibular therapy increased dizziness. Went to Neurologist and had procedure performed. Dizziness resolved, no signs of dizziness since last visit. Continues to use Ipratropium with improvement. Complaining of runny nose in the morning and at night.

## 2024-01-04 NOTE — REASON FOR VISIT
[Subsequent Evaluation] : a subsequent evaluation for [FreeTextEntry2] : allergic rhinitis, chronic vasomotor rhinitis, Benign paroxysmal positional vertigo of right ear

## 2024-01-04 NOTE — ASSESSMENT
[FreeTextEntry1] : Dizziness resolved after Epley with neurology (apparently vestibular therapy was not doing head positioning exercises??) REfractory VMR despite IB Discussed option of Isabella, scheduled with Dr. Suero

## 2024-01-18 PROBLEM — J30.9 ALLERGIC RHINITIS, UNSPECIFIED SEASONALITY, UNSPECIFIED TRIGGER: Status: ACTIVE | Noted: 2023-04-06

## 2024-01-18 NOTE — PATIENT PROFILE ADULT - BRADEN ACTIVITY
(4) walks frequently Initiate Treatment: Retin-A 0.025 % topical cream \\nSig: Apply pea sized amount to oily areas of face every night Detail Level: Zone Continue Regimen: clindamycin phosphate 1 % topical solution \\nSig: Apply to face qAM Render In Strict Bullet Format?: No Continue Regimen: clobetasol 0.05 % shampoo \\nSig: Wash Scalp every other day, leave on 3- 5 minutes before rinsing.\\n\\nDerma-Smoothe/FS Scalp Oil 0.01 % \\nSig: Apply thin amount to scalp daily 5 days per week prn itch or flake, leave off 2 days. Discontinue Regimen: Ketoconazole shampoo due to ineffectiveness.

## 2024-01-29 NOTE — HISTORY OF PRESENT ILLNESS
[Former] : former [>= 20 pack years] : >= 20 pack years [TextBox_4] : Mr. 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  breathing not a major concern other areas more troublesome activity not limited by breathing, sometimes breathes heavy, especially during PT 3x/wk Very very tired when coming home  NIF -60 in March 2023  # Apnea Screening 	(  ) Snoring while asleep? 	( x ) Tiredness during the day? 	(  ) Observed overnight apnea? 	(  ) Pressure elevated? 	(  ) BMI > 35? 	( x ) Age > 50? 	(  ) Neck circumference >16in? 	( x ) Gender = male?   Occupational Hx: Was  (, retired in 2020)/did [employer] work     Interval history Patient overall feels well, denies new complaints LDCT performed this month showed stable nodules Sleep study showed mild sleep apnea, the patient denies significant daytime somnolence, wishes to defer CPAP treatment at this time. [TextBox_11] : 1 [TextBox_13] : 60 [YearQuit] : 2022

## 2024-02-09 NOTE — PHYSICAL EXAM
[FreeTextEntry1] : MS: Awake, alert, oriented to person, place, situation and time. Flat affect. Follows commands.  Language: Speech is clear, fluent with good repetition & comprehension. No dysarthria.  CNs 2 - 12 intact. EOMI no nystagmus, no diplopia. VFF. No facial asymmetry b/l, full eye closure strength b/l. Hearing grossly normal. Head turning & shoulder shrug intact b/l. Tongue midline, normal movements, no atrophy.  Motor: Neck flexion and extension 5/5 Head turn 5/5 Shoulder shrug 5/5 Tongue 4/4  UE: Prox and Distal - fasciculations present BL, mainly localized to the thighs and arms Right: Deltoid 5/5 flex/ext 5/5, Elbow flex/ext 5/5,  5/5, IO 5/5 Left: Deltoid flex/ext 5/5, Elbow flex/ext 4/5,  4/5, IO weakness 4/5, atrophy in left ABP, Atrophy R FDI  LE: Prox and Distal Right: Hip flex/ext 5/5, Knee flex/ext 5/5, AFO braces present for chronic foot drop Left: Hip flex/ext 4+/5, Knee flex/ex 5/5, AFO braces present for chronic foot drop  Reflexes: DTR of Biceps 3+, Triceps 3+, Brachioradialis 3+, Patella 3+  Sensation: Intact to LT throughout  Cortical: No extinction  Coordination: No dysmetria to FTN   Gait: No postural instability. Uses rolling walker   ALS-FRS 32
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
## 2024-02-09 NOTE — ASSESSMENT
[FreeTextEntry1] : Patient is 74 yo LH man who presents to [facility] today to follow up on ALS ([id number]+) currently with some progression in LE weakness and recent falls with significant weight loss despite normal appetite and supplementation.  Current ALS-FRS is 32 and previously was 37.  Unable to tolerate Relyvrio due to severe diarrhea and currently on Radicava ORS and Riluzole.  Main concerns today include cognition, flat affect, difficulty with communication at times s/o PBA in addition to weight loss, and worsening balance. Recommend start Nuedexta for PBA, f/u with nutrition and if needed, may consider PEG placement prophylactically in near future.    Plan:  -Given cognition, flat affect will start Nuedexta 20/10 mg 1 tab PO BID  -C/w PT and AFO bracing -C/w Radicava and Riluzole for now -PFT pending with pulm -Dr. Perez follow up regarding weight loss, discussed PEG for additional nutrition, patient is amendable if unable to gain weight after following up with nutrition - discussed possible [id number] RCTs but most recent Phase 1 trial drug development discontinued due to lack of efficacy  -RTC 2 months

## 2024-03-14 PROBLEM — J30.0 CHRONIC VASOMOTOR RHINITIS: Status: ACTIVE | Noted: 2023-04-06

## 2024-03-14 PROBLEM — Z98.890 S/P NASAL SURGERY: Status: ACTIVE | Noted: 2024-01-01

## 2024-03-14 NOTE — PROCEDURE
[Post-Op Patency] : post-op patency [None] : none [Rigid Endoscope] : examined with a rigid endoscope [Congested] : congested [FreeTextEntry6] : The following anatomic sites were directly examined in a sequential fashion: The scope was introduced in the nasal passage between the middle and inferior turbinates to exam the inferior portion of the middle meatus and the fontanelle, as well as the maxillary ostia. Next, the scope was passed medically and posteriorly to the middle turbinates to examine the sphenoethmoid recess and the superior turbinate region.

## 2024-03-14 NOTE — HISTORY OF PRESENT ILLNESS
[FreeTextEntry1] : Patient  returns today  c/o Chronic vasomotor rhinitis. Had Clarifix on 1/18/24 with improvement of symptoms. Continues to have runny nose worsened at night. Using ipratropium bromide as needed.

## 2024-03-21 NOTE — H&P PST ADULT - NEUROLOGICAL
normal/cranial nerves II-XII intact/sensation intact Patient has no objection to blood transfusions.

## 2024-03-31 NOTE — ED ADULT NURSE NOTE - OBJECTIVE STATEMENT
Pt BIBA from home c/o SOB and chest tightness since last night. EMS endorses pt was hypoxic on their arrival. Pt maintains O2 Sat >92% on 3L NC in ED. Pt denies cough/fever/chills.

## 2024-03-31 NOTE — ED PROVIDER NOTE - OBJECTIVE STATEMENT
73-year-old male with past medical history of HLD GERD ALS not on home oxygen former smoker presents to the emergency department with chest tightness and shortness of breath since last night. Patient on arrival hypoxic 92% on room air improved to 94% on 3L.  Patient has no other complaints.  Denies fever or chills cough nausea vomiting diarrhea abdominal pain lower extremity edema or prior history of DVT or PE.

## 2024-03-31 NOTE — ED PROVIDER NOTE - DIFFERENTIAL DIAGNOSIS
Differential Diagnosis Chest pain and dyspnea, consider ACS vs PE vs dissection vs tamponade vs esophageal rupture vs pneumothorax vs pneumonia vs fluid overload

## 2024-03-31 NOTE — H&P ADULT - HISTORY OF PRESENT ILLNESS
Pt is a 74 y/o M with PMHx of HLD, GERD, ALS, former smoker presenting to the ED with SOB. Per pt and wife he has been having SOB since last night, progressively worsened in AM so they called EMS. On admission pt was noted to be hypoxic, wheezing, using accessory ms for respirations. He was placed on BiPAP given DUONEBS/steroids/ABx, now on NC and breathing comfortably. Reports intermittent cough and had increased sputum 1 week ago, per wife he completed oral Abx yesterday as prescribed by his PCP.    Vitals: /89  RR 20 T97.4F Sat 94% on 3L NC    CXR New right upper lobe patchy airspace opacity, possibly reflecting pneumonia in the appropriate clinical setting; follow-up to complete resolution is suggested.    Admitted to SDU

## 2024-03-31 NOTE — ED PROVIDER NOTE - PROGRESS NOTE DETAILS
Authored by Dr. Salgado: Patient initially on arrival was in respiratory distress with tachypnea and accessory muscle use and intercostal retractions, initial NIF of -22.  Patient given DuoNebs Solu-Medrol and started on BiPAP with significant improvement. Repeat NIF of -40. Patient will be placed in the stepdown unit

## 2024-03-31 NOTE — ED PROVIDER NOTE - STUDIES
"Requested Prescriptions   Pending Prescriptions Disp Refills     omeprazole (PRILOSEC) 20 MG CR capsule [Pharmacy Med Name: OMEPRAZOLE 20MG CPDR] 30 capsule 9     Sig: TAKE 1 CAPSULE BY MOUTH DAILY, 30 TO 60 MINUTES BEFORE A MEAL.    PPI Protocol Passed    3/1/2018  5:53 PM       Passed - Not on Clopidogrel (unless Pantoprazole ordered)       Passed - No diagnosis of osteoporosis on record       Passed - Recent or future visit with authorizing provider's specialty    Patient had office visit in the last year or has a visit in the next 30 days with authorizing provider.  See \"Patient Info\" tab in inbasket, or \"Choose Columns\" in Meds & Orders section of the refill encounter.            Passed - Patient is age 18 or older          Last Written Prescription Date:  5/25/17  Last Fill Quantity: 30,  # refills: 9   Last Office Visit with G, P or [facility] prescribing provider:  12/22/17   Future Office Visit:       " EKG - see results section for interpretation/Xray Image(s) - see wet read section for interpretation

## 2024-03-31 NOTE — PATIENT PROFILE ADULT - FALL HARM RISK - HARM RISK INTERVENTIONS
Assistance with ambulation/Assistance OOB with selected safe patient handling equipment/Communicate Risk of Fall with Harm to all staff/Discuss with provider need for PT consult/Monitor gait and stability/Reinforce activity limits and safety measures with patient and family/Review medications for side effects contributing to fall risk/Sit up slowly, dangle for a short time, stand at bedside before walking/Tailored Fall Risk Interventions/Toileting schedule using arm’s reach rule for commode and bathroom/Visual Cue: Yellow wristband and red socks/Bed in lowest position, wheels locked, appropriate side rails in place/Call bell, personal items and telephone in reach/Instruct patient to call for assistance before getting out of bed or chair/Non-slip footwear when patient is out of bed/[place] to call system/Physically safe environment - no spills, clutter or unnecessary equipment/Purposeful Proactive Rounding/Room/bathroom lighting operational, light cord in reach

## 2024-03-31 NOTE — H&P ADULT - ATTENDING COMMENTS
74 y/o M with PMHx of HLD, GERD, ALS, former smoker presenting to the ED with SOB.  Admitted to SDU for RUL Pneumonia    Agree  with assessment  except for changes below.   Vital Signs Last 24 Hrs  T(C): 37.3 (31 Mar 2024 20:27), Max: 37.3 (31 Mar 2024 20:27)  T(F): 99.2 (31 Mar 2024 20:27), Max: 99.2 (31 Mar 2024 20:27)  HR: 103 (31 Mar 2024 20:27) (103 - 120)  BP: 149/87 (31 Mar 2024 20:27) (149/87 - 161/89)  BP(mean): --  RR: 18 (31 Mar 2024 20:27) (18 - 20)  SpO2: 95% (31 Mar 2024 20:27) (94% - 98%)    Parameters below as of 31 Mar 2024 20:27  Patient On (Oxygen Delivery Method): room air    Chest X-Ray: New right upper lobe patchy airspace opacity, possibly reflecting   pneumonia in the appropriate clinical setting; follow-up to complete   resolution is suggested.      IMPRESSION  Acute Hypoxic   Respiratory Distress  Secondary to  Pneumonia  c/e abx  F/u Blood CX,, MRSA, Urine strep and legionella, Procal, RVP   Titrate supplemental O2 to maintain SpO2 92-96%  Avoid hyperoxia and wean off O2 gradually  Nebulizer treatment Q4H PRN  NIV  as needed.     Hx ALS  - C/w home meds - non formulary filled out  - Pt takes Radicava ORS 10 days on 18 days off, 18 days off begin TODAY 03/31/2024    Hx GERD  - Takes Esomeprazole at home, c/w pantoprazole therapeutic interchange while admitted 72 y/o M with PMHx of HLD, GERD, ALS, former smoker presenting to the ED with SOB.  Admitted to SDU for RUL Pneumonia    Agree  with assessment  except for changes below.   Vital Signs Last 24 Hrs  T(C): 37.3 (31 Mar 2024 20:27), Max: 37.3 (31 Mar 2024 20:27)  T(F): 99.2 (31 Mar 2024 20:27), Max: 99.2 (31 Mar 2024 20:27)  HR: 103 (31 Mar 2024 20:27) (103 - 120)  BP: 149/87 (31 Mar 2024 20:27) (149/87 - 161/89)  BP(mean): --  RR: 18 (31 Mar 2024 20:27) (18 - 20)  SpO2: 95% (31 Mar 2024 20:27) (94% - 98%)    Parameters below as of 31 Mar 2024 20:27  Patient On (Oxygen Delivery Method): room air    Chest X-Ray: New right upper lobe patchy airspace opacity, possibly reflecting   pneumonia in the appropriate clinical setting; follow-up to complete   resolution is suggested.    PHYSICAL EXAM  GENERAL: NAD,  HEAD:  NCAT, EOMI, MM  NECK: Supple, Nontender  NERVOUS SYSTEM:  AAOx3, NFD  CHEST/LUNG: +bs b/l Decreased breath sound in Right lower lobes , No wheezing   HEART: +s1s2 RRR  ABDOMEN: soft, NT/ND  EXTREMITIES:  pp, no edema  SKIN: age related skin changes       IMPRESSION  Acute Hypoxic   Respiratory Distress  Secondary to  Pneumonia  c/e abx  F/u Blood CX,, MRSA, Urine strep and legionella, Procal, RVP   Titrate supplemental O2 to maintain SpO2 92-96%  Avoid hyperoxia and wean off O2 gradually  Nebulizer treatment Q4H PRN  NIV  as needed.     Hx ALS  - C/w home meds - non formulary filled out  - Pt takes Radicava ORS 10 days on 18 days off, 18 days off begin TODAY 03/31/2024    Hx GERD  - Takes Esomeprazole at home, c/w pantoprazole therapeutic interchange while admitted    Seen on 03/31

## 2024-03-31 NOTE — H&P ADULT - NSHPPHYSICALEXAM_GEN_ALL_CORE
VITALS:   T(C): 36.2 (03-31-24 @ 11:12), Max: 36.2 (03-31-24 @ 11:12)  HR: 120 (03-31-24 @ 12:00) (116 - 120)  BP: 161/89 (03-31-24 @ 11:12) (161/89 - 161/89)  RR: 20 (03-31-24 @ 11:12) (20 - 20)  SpO2: 98% (03-31-24 @ 13:35) (94% - 98%)    GENERAL: NAD, lying in bed comfortably  HEENT:  Atraumatic, Normocephalic  CHEST/LUNG: Clear to auscultation bilaterally; No wheezing. Unlabored respirations  HEART: Regular rate and rhythm  ABDOMEN: BSx4; Soft, nontender, nondistended  EXTREMITIES:  Moves all extremities  NERVOUS SYSTEM:  A&Ox3, slow to respond (bl per wife)  SKIN: No rashes or lesions on exposed UE b/l

## 2024-03-31 NOTE — ED PROVIDER NOTE - CARE PLAN
1 Principal Discharge DX:	Acute respiratory distress  Secondary Diagnosis:	Right upper lobe pneumonia  Secondary Diagnosis:	ALS (amyotrophic lateral sclerosis)

## 2024-03-31 NOTE — H&P ADULT - ASSESSMENT
Pt is a 72 y/o M with PMHx of HLD, GERD, ALS, former smoker presenting to the ED with SOB.  Admitted to SDU for RUL PNA      #RUL PNA  - Requiring BIPAP- now on NC  - CXR - New RUL opacity  - Start Doxy and Rocephin  - FU MRSA Narea  - FU Urine Strep/Legionella  - FU RVP    #ALS  - C/w home meds - non formulary filled out  - Pt takes Radicava ORS 10 days on 18 days off, 18 days off begin TODAY 03/31/2024    #HLD  - not on statin    #GERD  - Takes Esomeprazole at home, c/w pantoprazole therapeutic interchange while admitted      # Misc  - DVT Prophylaxis: enoxaparin Injectable  - GI Prophylaxis: pantoprazole      - Diet: Diet, DASH/TLC  - Dispo: SDU         Pt is a 74 y/o M with PMHx of HLD, GERD, ALS, former smoker presenting to the ED with SOB.  Admitted to SDU for RUL Pneumonia      #RUL PNA  - Requiring BIPAP- now on NC  - CXR - New RUL opacity  - Start Doxy and Rocephin  - FU MRSA Narea  - FU Urine Strep/Legionella  - FU RVP    #ALS  - C/w home meds - non formulary filled out  - Pt takes Radicava ORS 10 days on 18 days off, 18 days off begin TODAY 03/31/2024    #HLD  - not on statin    #GERD  - Takes Esomeprazole at home, c/w pantoprazole therapeutic interchange while admitted      # Misc  - DVT Prophylaxis: enoxaparin Injectable  - GI Prophylaxis: pantoprazole      - Diet: Diet, DASH/TLC  - Dispo: SDU

## 2024-03-31 NOTE — ED ADULT TRIAGE NOTE - CHIEF COMPLAINT QUOTE
Pt BIBA from home c/o of SOB. PT states SOB started last night. Pt h/x of ALS. Pt sating 92% on room air per EMS.

## 2024-03-31 NOTE — ED PROVIDER NOTE - CONSIDERATION OF ADMISSION OBSERVATION
Consideration of Admission/Observation Patient with acute hypoxia with new O2 requirement 2/2 pneumonia - will require admission

## 2024-03-31 NOTE — ED PROVIDER NOTE - CLINICAL SUMMARY MEDICAL DECISION MAKING FREE TEXT BOX
Patient presented with worsening chest pain and shortness of breath since last night, history of ALS.  On arrival, patient hypoxic on room air which improved with O2 nasal cannula, no history of O2 requirement.  After O2 placement, patient in no acute distress, but wheezing bilaterally and patient with history of smoking although no formal diagnosis of COPD.  Initial negative inspiratory force obtained and was -22, however patient without acute distress on initial evaluation and with wheezing bilaterally, decision made to attempt symptomatic treatment and then reassess need for intubation. EKG was obtained and grossly nonischemic.  Obtained labs which were grossly unremarkable including no significant leukocytosis, anemia, signs of dehydration/ENDY, transaminitis or significant electrolyte abnormalities.  No evidence of respiratory acidosis.  Chest x-ray showed new right upper lobe opacity consistent with likely pneumonia.  After nebs/steroids in the ED, negative inspiratory force significantly improved to -40 and therefore at this time no indication for emergent intubation.  Patient remained stable during ED course, was given IV antibiotics.  Consulted ICU who agreed with plan to admit to stepdown at this time for further monitoring and management.  Patient and wife at bedside agreeable with plan.  Hemodynamically stable at time of admission. Patient presented with worsening chest pain and shortness of breath since last night, history of ALS.  On arrival, patient hypoxic on room air which improved with O2 nasal cannula, no history of O2 requirement.  After O2 placement, patient in no acute distress, but wheezing bilaterally and patient with history of smoking although no formal diagnosis of COPD.  Initial negative inspiratory force obtained and was -22, however patient without acute distress on initial evaluation and with wheezing bilaterally, decision made to attempt symptomatic treatment and then reassess need for intubation. EKG was obtained and grossly nonischemic.  Obtained labs which showed mildly elevated troponin but otherwise were grossly unremarkable including no significant leukocytosis, anemia, signs of dehydration/ENDY, transaminitis or significant electrolyte abnormalities.  No evidence of respiratory acidosis.  Chest x-ray showed new right upper lobe opacity consistent with likely pneumonia.  After nebs/steroids in the ED, negative inspiratory force significantly improved to -40 and therefore at this time no indication for emergent intubation.  Patient remained stable during ED course, was given IV antibiotics.  Consulted ICU who agreed with plan to admit to stepdown at this time for further monitoring and management.  Patient and wife at bedside agreeable with plan.  Hemodynamically stable at time of admission.

## 2024-03-31 NOTE — ED PROVIDER NOTE - PHYSICAL EXAMINATION
VITAL SIGNS: I have reviewed nursing notes and confirm.  CONSTITUTIONAL: non-toxic, + ill appearing  SKIN: no rash, no petechiae.  EYES: PERRL, EOMI, pink conjunctiva, anicteric  ENT: tongue midline, no exudates, MMM  NECK: Supple; no meningismus  CARD: RRR, no murmurs, equal radial pulses bilaterally 2+  RESP: + respiratory distress w tachypnea, intercostal retractions, increased WOB, crackles w scattered wheezing   ABD: Soft, non-tender, non-distended  EXT: Normal ROM x4. No edema. No calves tenderness  NEURO: Alert, oriented. no focal deficits   PSYCH: Cooperative, appropriate.

## 2024-04-01 NOTE — SWALLOW BEDSIDE ASSESSMENT ADULT - SLP PERTINENT HISTORY OF CURRENT PROBLEM
72 y/o Male with PMHx of HLD, GERD, ALS, former smoker. Presenting to the ED with worsening SOB. On admission pt was noted to be hypoxic, wheezing, using accessory ms for respirations. He was placed on BiPAP given DUONEBS/steroids/ABx, now on NC and breathing comfortably. CXR New right upper lobe patchy airspace opacity, possibly reflecting PNA in the appropriate clinical setting.

## 2024-04-01 NOTE — CONSULT NOTE ADULT - SUBJECTIVE AND OBJECTIVE BOX
Patient is a 73y old  Male who presents with a chief complaint of RUL Pneumonia (31 Mar 2024 15:47)      HPI:  Pt is a 72 y/o M with PMHx of HLD, GERD, ALS, former smoker presenting to the ED with SOB. Per pt and wife he has been having SOB since last night, progressively worsened in AM so they called EMS. On admission pt was noted to be hypoxic, wheezing, using accessory ms for respirations. He was placed on BiPAP given DUONEBS/steroids/ABx, now on NC and breathing comfortably. Reports intermittent cough and had increased sputum 1 week ago, per wife he completed oral Abx yesterday as prescribed by his PCP.    Vitals: /89  RR 20 T97.4F Sat 94% on 3L NC    CXR New right upper lobe patchy airspace opacity, possibly reflecting pneumonia in the appropriate clinical setting; follow-up to complete resolution is suggested.    Admitted to SDU     (31 Mar 2024 15:47)      PAST MEDICAL & SURGICAL HISTORY:  High cholesterol      Acid reflux      ALS (amyotrophic lateral sclerosis)      Left wrist injury  during his teens from glass door      H/O arthroscopy of knee          SOCIAL HX:   Smoking     Quit 1.5 years ago                     ETOH                            Other    FAMILY HISTORY:  FH: CAD (coronary artery disease) (Grandparent)    :  No known cardiovacular family hisotry     Review Of Systems:     All ROS are negative except per HPI       Allergies    No Known Allergies    Intolerances          PHYSICAL EXAM    ICU Vital Signs Last 24 Hrs  T(C): 36.3 (01 Apr 2024 06:40), Max: 37.3 (31 Mar 2024 20:27)  T(F): 97.3 (01 Apr 2024 06:40), Max: 99.2 (31 Mar 2024 20:27)  HR: 100 (01 Apr 2024 06:40) (100 - 120)  BP: 137/71 (01 Apr 2024 06:40) (132/57 - 161/89)  BP(mean): --  ABP: --  ABP(mean): --  RR: 18 (01 Apr 2024 06:40) (18 - 20)  SpO2: 98% (01 Apr 2024 06:40) (86% - 98%)    O2 Parameters below as of 01 Apr 2024 06:40  Patient On (Oxygen Delivery Method): nasal cannula  O2 Flow (L/min): 3          CONSTITUTIONAL:  NAD    ENT:   Airway patent,   Mouth with normal mucosa.     CARDIAC:   Slightly Tachy  Regular rhythm.    No edema      RESPIRATORY:   No wheezing  Bilateral BS   Not tachypneic,  No use of accessory muscles    GASTROINTESTINAL:  Abdomen soft,   Non-tender,   No guarding,   + BS      NEUROLOGICAL:   Alert and oriented       SKIN:   Skin normal color for race,   No evidence of rash.        LABS:                          12.4   8.95  )-----------( 279      ( 01 Apr 2024 05:27 )             36.3                                               03-31    141  |  98  |  21<H>  ----------------------------<  117<H>  4.1   |  29  |  0.7    Ca    9.6      31 Mar 2024 12:39    TPro  6.7  /  Alb  4.2  /  TBili  0.4  /  DBili  x   /  AST  25  /  ALT  20  /  AlkPhos  78  04-01                                             Urinalysis Basic - ( 01 Apr 2024 05:27 )    Color: x / Appearance: x / SG: x / pH: x  Gluc: 115 mg/dL / Ketone: x  / Bili: x / Urobili: x   Blood: x / Protein: x / Nitrite: x   Leuk Esterase: x / RBC: x / WBC x   Sq Epi: x / Non Sq Epi: x / Bacteria: x                                                  LIVER FUNCTIONS - ( 01 Apr 2024 05:27 )  Alb: 4.2 g/dL / Pro: 6.7 g/dL / ALK PHOS: 78 U/L / ALT: 20 U/L / AST: 25 U/L / GGT: x                                                                                                                                       X-Rays reviewed                                                                                     ECHO      MEDICATIONS  (STANDING):  cefTRIAXone   IVPB 1000 milliGRAM(s) IV Intermittent every 24 hours  dextromethorphan 20 mG/quinidine sulfate 10 mG 1 Capsule(s) Oral two times a day  doxycycline IVPB 100 milliGRAM(s) IV Intermittent every 12 hours  doxycycline IVPB      enoxaparin Injectable 40 milliGRAM(s) SubCutaneous every 24 hours  gabapentin 300 milliGRAM(s) Oral at bedtime  loratadine 10 milliGRAM(s) Oral daily  pantoprazole    Tablet 40 milliGRAM(s) Oral before breakfast  riluzole 50 milliGRAM(s) Oral two times a day  tamsulosin 0.4 milliGRAM(s) Oral at bedtime    MEDICATIONS  (PRN):  acetaminophen     Tablet .. 650 milliGRAM(s) Oral every 6 hours PRN Temp greater or equal to 38C (100.4F), Mild Pain (1 - 3)  albuterol    90 MICROgram(s) HFA Inhaler 2 Puff(s) Inhalation every 4 hours PRN Shortness of Breath and/or Wheezing  albuterol/ipratropium for Nebulization 3 milliLiter(s) Nebulizer every 6 hours PRN Shortness of Breath and/or Wheezing  aluminum hydroxide/magnesium hydroxide/simethicone Suspension 30 milliLiter(s) Oral every 4 hours PRN Dyspepsia  melatonin 3 milliGRAM(s) Oral at bedtime PRN Insomnia  ondansetron Injectable 4 milliGRAM(s) IV Push every 8 hours PRN Nausea and/or Vomiting

## 2024-04-01 NOTE — SWALLOW BEDSIDE ASSESSMENT ADULT - SWALLOW EVAL: DIAGNOSIS
Toleration observed with regular solids and thin liquids without overt s/s of penetration/aspiration.

## 2024-04-01 NOTE — PROGRESS NOTE ADULT - ASSESSMENT
72 y/o M with PMHx of HLD, GERD, ALS, former smoker presenting to the ED with SOB.  Admitted to SDU for RUL Pneumonia    # Acute Hypoxic Respiratory Failure  #RUL Pneumonia  No sepsis on admission  s/p BIPAP overnight, now on 3L NC saturating 97%  CXR reviewed and interpreted showing RUL opacity  CT Chest 04/01 reviewed/interpreted showing RLL opacity  dw pulm/crit, DGTF  - Cont IV ceftriaxone/doxycycline  - D-dimer  - f/u BCx, atypical pna workup  - Wean off O2 as tolerated  - SLP noted  - Prednisone 40mg x5d + duonebs    #Hx ALS  - C/w home meds - non formulary filled out  - Pt takes Radicava ORS 10 days on 18 days off, 18 days off begin 03/31/2024    #Hx GERD  - Takes Esomeprazole at home, c/w pantoprazole therapeutic interchange while admitted    #Progress Note Handoff  Pending (specify): Cont IV abx, prednisone, wean off O2  Family discussion: jaylin pt regarding plan of care  Disposition: Home

## 2024-04-02 NOTE — PHYSICAL THERAPY INITIAL EVALUATION ADULT - ADDITIONAL COMMENTS
Patient lives with wife in house with steps (+) chair lift. As per wife, patient requires assistance in ADLS and ambulation using RW and B AFO.

## 2024-04-02 NOTE — PROGRESS NOTE ADULT - ASSESSMENT
72 y/o M with PMHx of HLD, GERD, ALS, former smoker presenting to the ED with SOB and Admitted to SDU.    #Acute Hypoxic Respiratory Failure 2/2 RLL pneumonia (? aspiration)  #Sinus Tachycardia  -continue iv abx (ceftriaxone and changed from doxy to azithromycin today)  -monitor wbc and fever curve  -start IVF  -monitor o2 sat----patient remains on 2L via NC---check o2 sat on ambulation and consider need for homeo2 when ready for discharge- at this time remains acute  -pulm/crit care following  -repeat cxr with persistant RLL opacity   -aspiration precautions as per nursing protocol- hob elevation  -encourage incentive spirometry  -ddimer 211 and procal 0.08  -f/u bcx, strep and legionella  -rvp and covid negative  - Wean off O2 as tolerated depending on ambulatory o2 sat otherwise might need home o2 set up  -encourage continued smoking cessation (quit last year)  -cont nebs  -prednisone 40 mg daily x 5 days  -check repeat EKG, Echo and check TSH in am (to r/o hyperthyroidism as cause of sinus tachy)    #severe malnutrition/cachexia with temporal wasting  -calorie count  -suppl electrolytes   -patient endorses recent weight loss--->no known underlying malignancy   -nutrition eval    #Hx ALS  - C/w home meds--Radicava ORS 10 days on 18 days off, 18 days off begin 03/31/2024  - f/u with neurology    DVT/GI ppx  overall guarded prognosis    FULL CODE    pcp- Dr. Perez    #Progress Note Handoff  Pending (specify):  PT eval, repeat CXR, ekg and echo- monitor hr (if remains tachycardic would consider tele upgrade x 24 hrs)  Disposition: Unknown at this time___x_____    Total time spent to complete patient's bedside assessment, review medical chart, discuss medical plan of care with covering medical team was more than 50 minutes  with >50% of time spendt face to face with patient, discussion with patient/family and/or coordination of care

## 2024-04-02 NOTE — PHYSICAL THERAPY INITIAL EVALUATION ADULT - PERTINENT HX OF CURRENT PROBLEM, REHAB EVAL
Pt is a 72 y/o M with PMHx of HLD, GERD, ALS, former smoker presenting to the ED with SOB. Per pt and wife he has been having SOB since last night, progressively worsened in AM so they called EMS. On admission pt was noted to be hypoxic, wheezing, using accessory ms for respirations. He was placed on BiPAP given DUONEBS/steroids/ABx, now on NC and breathing comfortably. Reports intermittent cough and had increased sputum 1 week ago, per wife he completed oral Abx yesterday as prescribed by his PCP.

## 2024-04-02 NOTE — PROGRESS NOTE ADULT - ASSESSMENT
72 y/o M with PMHx of HLD, GERD, ALS, former smoker presenting to the ED with SOB.  Admitted to SDU for RUL Pneumonia and downgraded.     # Acute Hypoxic Respiratory Failure  #RUL Pneumonia  No sepsis on admission  s/p BIPAP overnight, now on 3L NC saturating 97%  no Home O2  CXR reviewed and interpreted showing RLL opacity - repeat cxr same as before  CT Chest 04/01 reviewed/interpreted showing RLL opacity  - Cont IV ceftriaxone/azithro  - D-dimer neg  - BCx NGTD, strep neg, RVP neg, MRSA neg  - Wean off O2 as tolerated  - SLP noted  - Prednisone 40mg x5d + duonebs    #Hx ALS  - C/w home meds - non formulary filled out  - Pt takes Radicava ORS 10 days on 18 days off, 18 days off begin 03/31/2024    #Hx GERD  - Takes Esomeprazole at home, c/w pantoprazole therapeutic interchange while admitted    - Pending --> PT eval, symptom control, IV abx, ambulating SpO2.

## 2024-04-02 NOTE — PHYSICAL THERAPY INITIAL EVALUATION ADULT - GENERAL OBSERVATIONS, REHAB EVAL
PT evaluation done. Patient on room air with O2 @ 97%, Hr 107bpm. After eval, O2 sat 95% HR 122bpm. nurse notified.

## 2024-04-03 NOTE — PROGRESS NOTE ADULT - ASSESSMENT
IMPRESSION:    COPD exacerbation  RLL pneumonia on CT   Former smoker quit 1.5 years ago   HO ALS     PLAN:    ABG noted with chronic compensated hypercapnia   CXR and CT noted; RLL opacity on CT   Finish 5 day course of Azithromycin and Rocephin   Keep spo2 more than 92%  BIPAP as needed and during sleep; should qualify for home BIPAP 12/6; 35%  ALS on medications; neurology follow up   Taper prednisone over 7 days; no wheezing noted today   Diet per speech and swallow; aspiration precautions   Albuterol nebs as needed  Chest PT; PT OT   DVT ppx  Will follow as needed

## 2024-04-03 NOTE — DISCHARGE NOTE NURSING/CASE MANAGEMENT/SOCIAL WORK - NSDCVIVACCINE_GEN_ALL_CORE_FT
Tdap; 22-Feb-2019 10:45; Silke Harden (RN); [employer]; [id number] (Exp. Date: 02-Nov-2020); IntraMuscular; Deltoid Right.; 0.5 milliLiter(s); VIS (VIS Published: 09-May-2013, VIS Presented: 22-Feb-2019);

## 2024-04-03 NOTE — PROGRESS NOTE ADULT - ASSESSMENT
74 y/o M with PMHx of HLD, GERD, ALS, former smoker presenting to the ED with SOB.  Admitted to SDU for RUL Pneumonia and downgraded.     # Acute Hypoxic Respiratory Failure  #RUL Pneumonia  Hypercapnia   No sepsis on admission  s/p BIPAP overnight, now on 3L NC saturating 97%  no Home O2  SpO2 on ambulation - 94%  CXR reviewed and interpreted showing RLL opacity - repeat cxr same as before  CT Chest 04/01 reviewed/interpreted showing RLL opacity  - Cont IV ceftriaxone/azithro - will change to vantin/azithro   - D-dimer neg  - BCx NGTD, strep neg, RVP neg, MRSA neg  - Wean off O2 as tolerated  - SLP noted  - Prednisone 40mg x5d + duonebs  - pulm consult appreciated     #H/O undesired wt loss  - CT AP oral & IV contrast-no sig findings   - TSH, Vit D, Thiamine levels - f/u  - likely 2/2 to als     #Hx ALS  - C/w home meds - non formulary filled out  - Pt takes Radicava ORS 10 days on 18 days off, 18 days off begin 03/31/2024    #Hx GERD  - Takes Esomeprazole at home, c/w pantoprazole therapeutic interchange while admitted    - Pending --> bipap for home, dietary consult, calorie count

## 2024-04-03 NOTE — PROGRESS NOTE ADULT - ASSESSMENT
74 y/o M with PMHx of HLD, GERD, ALS, former smoker presenting to the ED with SOB.  Admitted to SDU for RUL Pneumonia and downgraded.     # Acute Hypoxic Respiratory Failure  #RUL Pneumonia  No sepsis on admission  s/p BIPAP overnight, now on 3L NC saturating 97%  no Home O2  SpO2 on ambulation - 94%  CXR reviewed and interpreted showing RLL opacity - repeat cxr same as before  CT Chest 04/01 reviewed/interpreted showing RLL opacity  - Cont IV ceftriaxone/azithro - will change to PO from tmrw  - D-dimer neg  - BCx NGTD, strep neg, RVP neg, MRSA neg  - Wean off O2 as tolerated  - SLP noted  - Prednisone 40mg x5d + duonebs    #H/O undesired wt loss  - CT AP oral & IV contrast  - TSH, Vit D, Thiamine levels - f/u    #Hx ALS  - C/w home meds - non formulary filled out  - Pt takes Radicava ORS 10 days on 18 days off, 18 days off begin 03/31/2024    #Hx GERD  - Takes Esomeprazole at home, c/w pantoprazole therapeutic interchange while admitted    - Pending --> ct ap

## 2024-04-03 NOTE — DISCHARGE NOTE NURSING/CASE MANAGEMENT/SOCIAL WORK - PATIENT PORTAL LINK FT
You can access the FollowMyHealth Patient Portal offered by [facility] by registering at the following website: [email or web address]. By joining [employer]’s FollowMyHealth portal, you will also be able to view your health information using other applications (apps) compatible with our system.

## 2024-04-04 NOTE — PROGRESS NOTE ADULT - PROVIDER SPECIALTY LIST ADULT
Hospitalist
Internal Medicine
Internal Medicine
Pulmonology
Hospitalist
Hospitalist
Internal Medicine

## 2024-04-04 NOTE — PROGRESS NOTE ADULT - ASSESSMENT
74 y/o M with PMHx of HLD, GERD, ALS, former smoker presenting to the ED with SOB.  Admitted to SDU for RUL Pneumonia and downgraded.     # Acute Hypoxic Respiratory Failure  #RUL Pneumonia  No sepsis on admission  s/p BIPAP overnight, now on 3L NC saturating 97% --> off NC  no Home O2  SpO2 on ambulation - 94%  CXR reviewed and interpreted showing RLL opacity - repeat cxr same as before  CT Chest 04/01 reviewed/interpreted showing RLL opacity  - Cont PO vantin for 2 more days  - D-dimer neg  - BCx NGTD, strep neg, RVP neg, MRSA neg  - SLP noted  - Prednisone 40mg x5d + duonebs  - Patchy right basilar pulmonary opacities likely infectious.  - Needs home BIPAP 12/6, 35% per pulm   - severe muscle wasting 2/2 ALS   - CT A/P : 4mm left lower lobe pulmonary nodule. Recommend follow-up (this patient has had prior lung cancer screening CT).    #H/O undesired wt loss  - CT AP oral & IV contrast -  4mm left lower lobe pulmonary nodule. Recommend follow-up (this patient has had prior lung cancer screening CT).  - TSH, Vit D, Thiamine levels - normal    #Hx ALS  - C/w home meds - non formulary filled out  - Pt takes Radicava ORS 10 days on 18 days off, 18 days off begin 03/31/2024    #Hx GERD  - Takes Esomeprazole at home, c/w pantoprazole therapeutic interchange while admitted    - Pending --> Bipap needs at home.    74 y/o M with PMHx of HLD, GERD, ALS, former smoker presenting to the ED with SOB.  Admitted to SDU for RUL Pneumonia and downgraded.     # Acute Hypoxic Respiratory Failure  #RUL Pneumonia  No sepsis on admission  s/p BIPAP overnight, now on 3L NC saturating 97% --> off NC  no Home O2  SpO2 on ambulation - 94%  CXR reviewed and interpreted showing RLL opacity - repeat cxr same as before  CT Chest 04/01 reviewed/interpreted showing RLL opacity  - Cont PO vantin for 2 more days  - D-dimer neg  - BCx NGTD, strep neg, RVP neg, MRSA neg  - SLP noted  - Prednisone 40mg x5d + duonebs  - Patchy right basilar pulmonary opacities likely infectious.  - Needs home BIPAP 12/6, 35% per pulm   - severe muscle wasting 2/2 ALS   - CT A/P : 4mm left lower lobe pulmonary nodule. Recommend follow-up (this patient has had prior lung cancer screening CT).    #H/O undesired wt loss  - CT AP oral & IV contrast -  4mm left lower lobe pulmonary nodule. Recommend follow-up (this patient has had prior lung cancer screening CT).  - TSH, Vit D, Thiamine levels - normal    #Hx ALS  - C/w home meds - non formulary filled out  - Pt takes Radicava ORS 10 days on 18 days off, 18 days off begin 03/31/2024    #Hx GERD  - Takes Esomeprazole at home, c/w pantoprazole therapeutic interchange while admitted    - Pending --> Bipap needs at home.     Mr. Garcia, a 73 yr old male with chronic hypercapnic respiratory failure secondary to ALS (Amyotrophic Lateral Sclerosis) and COPD.     On this admission, he was retaining High CO2 levels of 51. He will require a non invasive ventilator due to his persistent hypercapnia. The NIV will provide adequate continuous ventilation to keep the patients hypercapnia under control. this NIV will provide longer expiratory time to reduce the effects of flow limitation and air trapping. This will decrease his work of breathing, decrease the amount of CO@ and increase oxygenation. This is the only machine that proves mouthpiece ventilation.   Without this NIV Mr. Garcia will decline clinically and will cause readmissions. He requires augmented volume NIV not found on standard BIPAP.     This patient will need this NIV joel a safe discharge.     Please expedite this authorisation.

## 2024-04-04 NOTE — GOALS OF CARE CONVERSATION - ADVANCED CARE PLANNING - CONVERSATION DETAILS
Patient stated that he would not want a PEG tube or any other feed tube.  He would like to be DNR/DNI.  What is most important for him is being home with his wife.

## 2024-04-04 NOTE — DISCHARGE NOTE PROVIDER - HOSPITAL COURSE
Pt is a 74 y/o M with PMHx of HLD, GERD, ALS, former smoker presenting to the ED with SOB. Per pt and wife he has been having SOB since last night, progressively worsened in AM so they called EMS. On admission pt was noted to be hypoxic, wheezing, using accessory ms for respirations. He was placed on BiPAP given DUONEBS/steroids/ABx, now on NC and breathing comfortably. Reports intermittent cough and had increased sputum 1 week ago, per wife he completed oral Abx yesterday as prescribed by his PCP.  Vitals: /89  RR 20 T97.4F Sat 94% on 3L NC  CXR New right lower lobe patchy airspace opacity, possibly reflecting pneumonia in the appropriate clinical setting; follow-up to complete resolution is suggested.    Admitted to medicine under COPD exacerbation 2/2 RLL pnuemonia.    74 y/o M with PMHx of HLD, GERD, ALS, former smoker presenting to the ED with SOB.  Admitted for RLL Pneumonia causing COPD exacerbation.     # Acute Hypoxic Respiratory Failure  #RLL Pneumonia  No sepsis on admission  s/p BIPAP overnight, now on 3L NC saturating 97% --> off oxygen now  SpO2 on ambulation - 94%  CXR reviewed and interpreted showing RLL opacity   CT Chest 04/01 reviewed/interpreted showing RLL opacity  - Cont PO vantin for 1 more day  - D-dimer neg  - BCx NGTD, strep neg, RVP neg, MRSA neg  - continue prednisone 20mg for 2 more days and then 10mg for 1 day and stop  - Needs home BIPAP 12/6, 35% per pulm   - severe muscle wasting 2/2 ALS   - CT A/P : 4mm left lower lobe pulmonary nodule. Recommend follow-up (this patient has had prior lung cancer screening CT).    #H/O undesired wt loss  #4mm lung nodule on CT chest  - CT AP oral & IV contrast -  4mm left lower lobe pulmonary nodule. Recommend follow-up (this patient has had prior lung cancer screening CT).  - TSH, Vit D, Thiamine levels - normal    #Hx ALS  - C/w home meds - non formulary filled out  - Pt takes Radicava ORS 10 days on 18 days off, 18 days off begin 03/31/2024    #Hx GERD  - Takes Esomeprazole at home, c/w pantoprazole therapeutic interchange while admitted    - stable to go home.    Pt is a 74 y/o M with PMHx of HLD, GERD, ALS, former smoker presenting to the ED with SOB. Per pt and wife he has been having SOB since last night, progressively worsened in AM so they called EMS. On admission pt was noted to be hypoxic, wheezing, using accessory ms for respirations. He was placed on BiPAP given DUONEBS/steroids/ABx, now on NC and breathing comfortably. Reports intermittent cough and had increased sputum 1 week ago, per wife he completed oral Abx yesterday as prescribed by his PCP.  Vitals: /89  RR 20 T97.4F Sat 94% on 3L NC  CXR New right lower lobe patchy airspace opacity, possibly reflecting pneumonia in the appropriate clinical setting; follow-up to complete resolution is suggested.    Admitted to medicine under COPD exacerbation 2/2 RLL pnuemonia.    74 y/o M with PMHx of HLD, GERD, ALS, former smoker presenting to the ED with SOB.  Admitted for RLL Pneumonia causing COPD exacerbation.     # Acute Hypoxic Respiratory Failure  #RLL Pneumonia  No sepsis on admission  s/p BIPAP overnight, now on 3L NC saturating 97% --> off oxygen now  SpO2 on ambulation - 94%  CXR reviewed and interpreted showing RLL opacity   CT Chest 04/01 reviewed/interpreted showing RLL opacity  - Cont PO vantin for 1 more day  - D-dimer neg  - BCx NGTD, strep neg, RVP neg, MRSA neg  - continue prednisone 20mg for 2 more days and then 10mg for 1 day and stop  - Needs home BIPAP 12/6, 35% per pulm - bipap will be delivered, family and pt understand it may take a few days but request discharge even if not present in the home   - severe muscle wasting 2/2 ALS   - CT A/P : 4mm left lower lobe pulmonary nodule. Recommend follow-up (this patient has had prior lung cancer screening CT).    #H/O undesired wt loss  #4mm lung nodule on CT chest  - CT AP oral & IV contrast -  4mm left lower lobe pulmonary nodule. Recommend follow-up (this patient has had prior lung cancer screening CT).  - TSH, Vit D, Thiamine levels - normal    #Hx ALS  - C/w home meds - non formulary filled out  - Pt takes Radicava ORS 10 days on 18 days off, 18 days off begin 03/31/2024    #Hx GERD  - Takes Esomeprazole at home, c/w pantoprazole therapeutic interchange while admitted    #GOC  -MOLST complicated 4/4- dnr/dni no feeding tube   - stable to go home with home care

## 2024-04-04 NOTE — DISCHARGE NOTE PROVIDER - ATTENDING DISCHARGE PHYSICAL EXAMINATION:
Vital Signs Last 24 Hrs  T(F): 97.4 (04 Apr 2024 07:35), Max: 97.6 (03 Apr 2024 23:29)  HR: 101 (04 Apr 2024 07:35) (101 - 110)  BP: 152/81 (04 Apr 2024 07:35) (146/75 - 153/101)  RR: 20 (04 Apr 2024 07:35) (20 - 20)  SpO2: 98% (04 Apr 2024 07:35) (96% - 98%)    PHYSICAL EXAM:  GENERAL: NAD, poorly-groomed, poorly-developed, cachexia   HEAD:  Atraumatic, Normocephalic  EYES: EOMI, conjunctiva and sclera clear  ENMT: Moist mucous membranes, Good dentition, no thrush  NECK: Supple, No JVD  CHEST/LUNG: Clear to auscultation bilaterally, good air entry, non-labored breathing  HEART: RRR; S1/S2, 2/6 systolic murmur   ABDOMEN: Soft, Nontender, Nondistended; Bowel sounds present  VASCULAR: Normal pulses, Normal capillary refill  EXTREMITIES: No calf tenderness, No cyanosis, No edema  LYMPH: Normal; No lymphadenopathy noted  SKIN: Warm, Intact  PSYCH: Normal mood, Normal affect  NERVOUS SYSTEM:  A/O x3, Good concentration; CN 2-12 intact, No focal deficits

## 2024-04-04 NOTE — DISCHARGE NOTE PROVIDER - PROVIDER TOKENS
PROVIDER:[TOKEN:[[id number]],FOLLOWUP:[2 weeks]],PROVIDER:[TOKEN:[[id number]],FOLLOWUP:[2 weeks]],PROVIDER:[TOKEN:[[id number]],FOLLOWUP:[2 weeks]]

## 2024-04-04 NOTE — DISCHARGE NOTE PROVIDER - CARE PROVIDERS DIRECT ADDRESSES
,DirectAddress_Unknown,[email or web address],DirectAddress_Unknown

## 2024-04-04 NOTE — PROGRESS NOTE ADULT - SUBJECTIVE AND OBJECTIVE BOX
HARRIET RAI  73y, Male  Allergy: No Known Allergies    Hospital Day: 1d    Patient seen and examined. No acute events overnight    PMH/PSH:  PAST MEDICAL & SURGICAL HISTORY:  High cholesterol      Acid reflux      ALS (amyotrophic lateral sclerosis)      Left wrist injury  during his teens from glass door      H/O arthroscopy of knee          VITALS:  T(F): 97.4 (04-01-24 @ 07:28), Max: 99.2 (03-31-24 @ 20:27)  HR: 99 (04-01-24 @ 07:28)  BP: 137/74 (04-01-24 @ 07:28) (132/57 - 149/87)  RR: 18 (04-01-24 @ 07:28)  SpO2: 95% (04-01-24 @ 07:28)    TESTS & MEASUREMENTS:  Weight (Kg): 70.3 (03-31-24 @ 11:12)                            12.4   8.95  )-----------( 279      ( 01 Apr 2024 05:27 )             36.3       04-01    140  |  100  |  21<H>  ----------------------------<  115<H>  4.2   |  32  |  0.6<L>    Ca    10.3      01 Apr 2024 05:27  Phos  5.3     04-01  Mg     1.8     04-01    TPro  6.7  /  Alb  4.2  /  TBili  0.4  /  DBili  x   /  AST  25  /  ALT  20  /  AlkPhos  78  04-01    LIVER FUNCTIONS - ( 01 Apr 2024 05:27 )  Alb: 4.2 g/dL / Pro: 6.7 g/dL / ALK PHOS: 78 U/L / ALT: 20 U/L / AST: 25 U/L / GGT: x                 Urinalysis Basic - ( 01 Apr 2024 05:27 )    Color: x / Appearance: x / SG: x / pH: x  Gluc: 115 mg/dL / Ketone: x  / Bili: x / Urobili: x   Blood: x / Protein: x / Nitrite: x   Leuk Esterase: x / RBC: x / WBC x   Sq Epi: x / Non Sq Epi: x / Bacteria: x        RADIOLOGY & ADDITIONAL TESTS:    RECENT DIAGNOSTIC ORDERS:  Magnesium: AM Sched. Collection: 02-Apr-2024 04:30 (04-01-24 @ 09:40)  Comprehensive Metabolic Panel: AM Sched. Collection: 02-Apr-2024 04:30 (04-01-24 @ 09:40)  Complete Blood Count + Automated Diff: AM Sched. Collection: 02-Apr-2024 04:30 (04-01-24 @ 09:40)  D-Dimer Assay, Quantitative:  Start Date:01-Apr-2024. 11:00 (04-01-24 @ 09:15)  Procalcitonin, Serum: 11:00 (04-01-24 @ 06:21)  Hepatitis C Antibody Screen: AM Sched. Collection: 01-Apr-2024 04:30 (03-31-24 @ 16:03)  Legionella pneumophila Antigen, Urine: Routine (03-31-24 @ 15:43)  Streptococcus pneumoniae Ag, Ur: Routine (03-31-24 @ 15:43)  Diet, DASH/TLC:   Sodium & Cholesterol Restricted (03-31-24 @ 15:22)      MEDICATIONS:  MEDICATIONS  (STANDING):  cefTRIAXone   IVPB 1000 milliGRAM(s) IV Intermittent every 24 hours  dextromethorphan 20 mG/quinidine sulfate 10 mG 1 Capsule(s) Oral two times a day  doxycycline IVPB      doxycycline IVPB 100 milliGRAM(s) IV Intermittent every 12 hours  enoxaparin Injectable 40 milliGRAM(s) SubCutaneous every 24 hours  gabapentin 300 milliGRAM(s) Oral at bedtime  loratadine 10 milliGRAM(s) Oral daily  pantoprazole    Tablet 40 milliGRAM(s) Oral before breakfast  predniSONE   Tablet 40 milliGRAM(s) Oral daily  riluzole 50 milliGRAM(s) Oral two times a day  tamsulosin 0.4 milliGRAM(s) Oral at bedtime    MEDICATIONS  (PRN):  acetaminophen     Tablet .. 650 milliGRAM(s) Oral every 6 hours PRN Temp greater or equal to 38C (100.4F), Mild Pain (1 - 3)  albuterol    90 MICROgram(s) HFA Inhaler 2 Puff(s) Inhalation every 4 hours PRN Shortness of Breath and/or Wheezing  albuterol/ipratropium for Nebulization 3 milliLiter(s) Nebulizer every 6 hours PRN Shortness of Breath and/or Wheezing  aluminum hydroxide/magnesium hydroxide/simethicone Suspension 30 milliLiter(s) Oral every 4 hours PRN Dyspepsia  melatonin 3 milliGRAM(s) Oral at bedtime PRN Insomnia  ondansetron Injectable 4 milliGRAM(s) IV Push every 8 hours PRN Nausea and/or Vomiting      HOME MEDICATIONS:  Albuterol (Eqv-Proventil HFA) 90 mcg/inh inhalation aerosol (03-31)  Flomax 0.4 mg oral capsule (03-31)  gabapentin 300 mg oral capsule (03-31)  ipratropium 21 mcg/inh (0.03%) nasal spray (03-31)  levocetirizine 5 mg oral tablet (03-31)  naproxen 500 mg (as sodium) oral tablet, extended release (03-31)  NexIUM 20 mg oral delayed release capsule (03-31)  Nuedexta 20 mg-10 mg oral capsule (03-31)  Radicava  mg/5 mL oral suspension (03-31)  riluzole 50 mg oral tablet (03-31)      REVIEW OF SYSTEMS:  All other review of systems is negative unless indicated above.     PHYSICAL EXAM:  PHYSICAL EXAM:  GENERAL: NAD  HEAD:  Atraumatic, Normocephalic  NECK: Supple, No JVD  CHEST/LUNG: Clear to auscultation bilaterally; No wheeze  HEART: Regular rate and rhythm; No murmurs, rubs, or gallops  ABDOMEN: Soft, Nontender, Nondistended; Bowel sounds present  EXTREMITIES:  2+ Peripheral Pulses, No clubbing, cyanosis, or edema  SKIN: No rashes or lesions      
  Patient is a 73y old  Male who presents with a chief complaint of RUL Pneumonia (02 Apr 2024 11:36)    HPI:  Pt is a 74 y/o M with PMHx of HLD, GERD, ALS, former smoker presenting to the ED with SOB. Per pt and wife he has been having SOB since last night, progressively worsened in AM so they called EMS. On admission pt was noted to be hypoxic, wheezing, using accessory ms for respirations. He was placed on BiPAP given DUONEBS/steroids/ABx, now on NC and breathing comfortably. Reports intermittent cough and had increased sputum 1 week ago, per wife he completed oral Abx yesterday as prescribed by his PCP.    Vitals: /89  RR 20 T97.4F Sat 94% on 3L NC    CXR New right upper lobe patchy airspace opacity, possibly reflecting pneumonia in the appropriate clinical setting; follow-up to complete resolution is suggested.    Admitted to SDU   (31 Mar 2024 15:47)    PAST MEDICAL & SURGICAL HISTORY:  High cholesterol  Acid reflux  ALS (amyotrophic lateral sclerosis)    patient seen and examined independently on morning rounds for the first time today, chart reviewed and discussed with the medicine resident and on interdisciplinary rounds:    downgraded from SDU to regular floor overnight--remains tachycardic and on 2L O2 via NC    hospital day #2    Vital Signs Last 24 Hrs  T(C): 36.6 (02 Apr 2024 15:00), Max: 36.6 (01 Apr 2024 21:33)  T(F): 97.9 (02 Apr 2024 15:00), Max: 97.9 (02 Apr 2024 15:00)  HR: 119 (02 Apr 2024 15:00) (103 - 119)  BP: 167/92 (02 Apr 2024 15:00) (159/93 - 170/90)  BP(mean): --  RR: 18 (02 Apr 2024 07:40) (18 - 18)  SpO2: 99% (02 Apr 2024 09:53) (99% - 99%)    Parameters below as of 02 Apr 2024 09:53  Patient On (Oxygen Delivery Method): nasal cannula  O2 Flow (L/min): 2    PE:  GEN- mildly diaphoretic, thin/cachectic and malnourished with temporal wasting   PULM- decreased bs bilateral bases- fair air entry  CVS- +s1/s2 tachycardic   GI- soft NT ND +bs, no rebound, no guarding  EXT- no edema                          13.0   9.88  )-----------( 277      ( 02 Apr 2024 06:39 )             38.5     04-02    146  |  102  |  25<H>  ----------------------------<  101<H>  3.5   |  31  |  0.7    Ca    9.8      02 Apr 2024 06:39  Phos  5.3     04-01  Mg     1.8     04-02    TPro  6.5  /  Alb  4.0  /  TBili  0.3  /  DBili  x   /  AST  26  /  ALT  20  /  AlkPhos  77  04-02        Urinalysis Basic - ( 02 Apr 2024 06:39 )    Color: x / Appearance: x / SG: x / pH: x  Gluc: 101 mg/dL / Ketone: x  / Bili: x / Urobili: x   Blood: x / Protein: x / Nitrite: x   Leuk Esterase: x / RBC: x / WBC x   Sq Epi: x / Non Sq Epi: x / Bacteria: x          MEDICATIONS  (STANDING):  azithromycin  IVPB      cefTRIAXone   IVPB 1000 milliGRAM(s) IV Intermittent every 24 hours  dextromethorphan 20 mG/quinidine sulfate 10 mG 1 Capsule(s) Oral two times a day  enoxaparin Injectable 40 milliGRAM(s) SubCutaneous every 24 hours  gabapentin 300 milliGRAM(s) Oral at bedtime  loratadine 10 milliGRAM(s) Oral daily  pantoprazole    Tablet 40 milliGRAM(s) Oral before breakfast  predniSONE   Tablet 40 milliGRAM(s) Oral daily  riluzole 50 milliGRAM(s) Oral two times a day  tamsulosin 0.4 milliGRAM(s) Oral at bedtime  
24H events:    Patient is a 73y old Male who presents with a chief complaint of RLL Pneumonia (03 Apr 2024 08:06)    Primary diagnosis of Acute respiratory distress    Family communication:  Contact date:  Name of person contacted:  Relationship to patient:  Communication details:  What matters most:    PAST MEDICAL & SURGICAL HISTORY  High cholesterol    Acid reflux    ALS (amyotrophic lateral sclerosis)    Left wrist injury  during his teens from glass door    H/O arthroscopy of knee      SOCIAL HISTORY:  Social History:  Former smoker (31 Mar 2024 15:47)      ALLERGIES:  No Known Allergies    MEDICATIONS:  STANDING MEDICATIONS  cefTRIAXone   IVPB 1000 milliGRAM(s) IV Intermittent every 24 hours  dextromethorphan 20 mG/quinidine sulfate 10 mG 1 Capsule(s) Oral two times a day  enoxaparin Injectable 40 milliGRAM(s) SubCutaneous every 24 hours  gabapentin 300 milliGRAM(s) Oral at bedtime  loratadine 10 milliGRAM(s) Oral daily  pantoprazole    Tablet 40 milliGRAM(s) Oral before breakfast  potassium chloride    Tablet ER 40 milliEquivalent(s) Oral once  predniSONE   Tablet 40 milliGRAM(s) Oral daily  riluzole 50 milliGRAM(s) Oral two times a day  tamsulosin 0.4 milliGRAM(s) Oral at bedtime    PRN MEDICATIONS  acetaminophen     Tablet .. 650 milliGRAM(s) Oral every 6 hours PRN  albuterol    90 MICROgram(s) HFA Inhaler 2 Puff(s) Inhalation every 4 hours PRN  albuterol/ipratropium for Nebulization 3 milliLiter(s) Nebulizer every 6 hours PRN  aluminum hydroxide/magnesium hydroxide/simethicone Suspension 30 milliLiter(s) Oral every 4 hours PRN  melatonin 3 milliGRAM(s) Oral at bedtime PRN  ondansetron Injectable 4 milliGRAM(s) IV Push every 8 hours PRN    VITALS:   T(F): 96.8  HR: 104  BP: 164/82  RR: 18  SpO2: 95%    PHYSICAL EXAM:  GENERAL:   (z  ) NAD, lying in bed comfortably     (  ) obtunded     (  ) lethargic     (  ) somnolent    HEAD:   (x  ) Atraumatic     (  ) hematoma     (  ) laceration (specify location:       )     NECK:  ( x ) Supple     (  ) neck stiffness     (  ) nuchal rigidity     (  )  no JVD     (  ) JVD present ( -- cm)    HEART:  Rate -->     (  x) normal rate     (  ) bradycardic     (  ) tachycardic  Rhythm -->     (x  ) regular     (  ) regularly irregular     (  ) irregularly irregular  Murmurs -->     (  ) normal s1s2     (  ) systolic murmur     (  ) diastolic murmur     (  ) continuous murmur      (  ) S3 present     (  ) S4 present    LUNGS:   ( x )Unlabored respirations     (  ) tachypnea  (  x) B/L air entry     (  ) decreased breath sounds in:  (location     )    (  ) no adventitious sound     (  ) crackles     (  ) wheezing      (  ) rhonchi      (specify location:       )  (  ) chest wall tenderness (specify location:       )    ABDOMEN:   ( x ) Soft     (  ) tense   |   ( x ) nondistended     (  ) distended   |   (  ) +BS     (  ) hypoactive bowel sounds     (  ) hyperactive bowel sounds  (  ) nontender     (  ) RUQ tenderness     (  ) RLQ tenderness     (  ) LLQ tenderness     (  ) epigastric tenderness     (  ) diffuse tenderness  (  ) Splenomegaly      (  ) Hepatomegaly      (  ) Jaundice     (  ) ecchymosis     EXTREMITIES:  ( x ) Normal     (  ) Rash     (  ) ecchymosis     (  ) varicose veins      (  ) pitting edema     (  ) non-pitting edema   (  ) ulceration     (  ) gangrene:     (location:     )    NERVOUS SYSTEM:    ( x ) A&Ox3     (  ) confused     (  ) lethargic  CN II-XII:     (  ) Intact     (  ) deficits found     (Specify:     )   Upper extremities:     (  ) no sensorimotor deficits     (  ) weakness     (  ) loss of proprioception/vibration     (  ) loss of touch/temperature (specify:    )  Lower extremities:     (  ) no sensorimotor deficits     (  ) weakness     (  ) loss of proprioception/vibration     (  ) loss of touch/temperature (specify:    )    LABS:                        13.7   9.43  )-----------( 297      ( 03 Apr 2024 06:12 )             40.1     04-03    144  |  99  |  27<H>  ----------------------------<  98  3.4<L>   |  33<H>  |  0.8    Ca    10.5      03 Apr 2024 06:12  Mg     1.8     04-02    TPro  6.5  /  Alb  4.0  /  TBili  0.3  /  DBili  x   /  AST  26  /  ALT  20  /  AlkPhos  77  04-02    Urinalysis Basic - ( 03 Apr 2024 06:12 )    Color: x / Appearance: x / SG: x / pH: x  Gluc: 98 mg/dL / Ketone: x  / Bili: x / Urobili: x   Blood: x / Protein: x / Nitrite: x   Leuk Esterase: x / RBC: x / WBC x   Sq Epi: x / Non Sq Epi: x / Bacteria: x  
24H events:    Patient is a 73y old Male who presents with a chief complaint of RUL Pneumonia (01 Apr 2024 11:57)    Primary diagnosis of Acute respiratory distress    Family communication:  Contact date:  Name of person contacted:  Relationship to patient:  Communication details:  What matters most:    PAST MEDICAL & SURGICAL HISTORY  High cholesterol    Acid reflux    ALS (amyotrophic lateral sclerosis)    Left wrist injury  during his teens from glass door    H/O arthroscopy of knee      SOCIAL HISTORY:  Social History:  Former smoker (31 Mar 2024 15:47)    ALLERGIES:  No Known Allergies    MEDICATIONS:  STANDING MEDICATIONS  azithromycin  IVPB      cefTRIAXone   IVPB 1000 milliGRAM(s) IV Intermittent every 24 hours  dextromethorphan 20 mG/quinidine sulfate 10 mG 1 Capsule(s) Oral two times a day  enoxaparin Injectable 40 milliGRAM(s) SubCutaneous every 24 hours  gabapentin 300 milliGRAM(s) Oral at bedtime  loratadine 10 milliGRAM(s) Oral daily  pantoprazole    Tablet 40 milliGRAM(s) Oral before breakfast  predniSONE   Tablet 40 milliGRAM(s) Oral daily  riluzole 50 milliGRAM(s) Oral two times a day  tamsulosin 0.4 milliGRAM(s) Oral at bedtime    PRN MEDICATIONS  acetaminophen     Tablet .. 650 milliGRAM(s) Oral every 6 hours PRN  albuterol    90 MICROgram(s) HFA Inhaler 2 Puff(s) Inhalation every 4 hours PRN  albuterol/ipratropium for Nebulization 3 milliLiter(s) Nebulizer every 6 hours PRN  aluminum hydroxide/magnesium hydroxide/simethicone Suspension 30 milliLiter(s) Oral every 4 hours PRN  melatonin 3 milliGRAM(s) Oral at bedtime PRN  ondansetron Injectable 4 milliGRAM(s) IV Push every 8 hours PRN    VITALS:   T(F): 97.2  HR: 103  BP: 159/93  RR: 18  SpO2: 99%    PHYSICAL EXAM:  GENERAL:   ( x ) NAD, lying in bed comfortably     (  ) obtunded     (  ) lethargic     (  ) somnolent    HEAD:   (  x) Atraumatic     (  ) hematoma     (  ) laceration (specify location:       )     NECK:  (x  ) Supple     (  ) neck stiffness     (  ) nuchal rigidity     (  )  no JVD     (  ) JVD present ( -- cm)    HEART:  Rate -->     ( x ) normal rate     (  ) bradycardic     (  ) tachycardic  Rhythm -->     (x  ) regular     (  ) regularly irregular     (  ) irregularly irregular  Murmurs -->     (  ) normal s1s2     (  ) systolic murmur     (  ) diastolic murmur     (  ) continuous murmur      (  ) S3 present     (  ) S4 present    LUNGS:   ( x )Unlabored respirations     (  ) tachypnea  (x  ) B/L air entry     (  ) decreased breath sounds in:  (location     )    (  ) no adventitious sound     (  ) crackles     (  ) wheezing      (  ) rhonchi      (specify location:       )  (  ) chest wall tenderness (specify location:       )    ABDOMEN:   (x  ) Soft     (  ) tense   |   (  x) nondistended     (  ) distended   |   (  ) +BS     (  ) hypoactive bowel sounds     (  ) hyperactive bowel sounds  (  ) nontender     (  ) RUQ tenderness     (  ) RLQ tenderness     (  ) LLQ tenderness     (  ) epigastric tenderness     (  ) diffuse tenderness  (  ) Splenomegaly      (  ) Hepatomegaly      (  ) Jaundice     (  ) ecchymosis     EXTREMITIES:  (  x) Normal     (  ) Rash     (  ) ecchymosis     (  ) varicose veins      (  ) pitting edema     (  ) non-pitting edema   (  ) ulceration     (  ) gangrene:     (location:     )    NERVOUS SYSTEM:    (  x) A&Ox3     (  ) confused     (  ) lethargic  CN II-XII:     (  ) Intact     (  ) deficits found     (Specify:     )   Upper extremities:     (  ) no sensorimotor deficits     (  ) weakness     (  ) loss of proprioception/vibration     (  ) loss of touch/temperature (specify:    )  Lower extremities:     (  ) no sensorimotor deficits     (  ) weakness     (  ) loss of proprioception/vibration     (  ) loss of touch/temperature (specify:    )    LABS:                        13.0   9.88  )-----------( 277      ( 02 Apr 2024 06:39 )             38.5     04-02    146  |  102  |  25<H>  ----------------------------<  101<H>  3.5   |  31  |  0.7    Ca    9.8      02 Apr 2024 06:39  Phos  5.3     04-01  Mg     1.8     04-02    TPro  6.5  /  Alb  4.0  /  TBili  0.3  /  DBili  x   /  AST  26  /  ALT  20  /  AlkPhos  77  04-02    Urinalysis Basic - ( 02 Apr 2024 06:39 )    Color: x / Appearance: x / SG: x / pH: x  Gluc: 101 mg/dL / Ketone: x  / Bili: x / Urobili: x   Blood: x / Protein: x / Nitrite: x   Leuk Esterase: x / RBC: x / WBC x   Sq Epi: x / Non Sq Epi: x / Bacteria: x  
24H events:    Patient is a 73y old Male who presents with a chief complaint of RUL Pneumonia (03 Apr 2024 18:03)    Primary diagnosis of Acute respiratory distress    Family communication:  Contact date:  Name of person contacted:  Relationship to patient:  Communication details:  What matters most:    PAST MEDICAL & SURGICAL HISTORY  High cholesterol    Acid reflux    ALS (amyotrophic lateral sclerosis)    Left wrist injury  during his teens from glass door    H/O arthroscopy of knee    SOCIAL HISTORY:  Social History:  Former smoker (31 Mar 2024 15:47)    ALLERGIES:  No Known Allergies    MEDICATIONS:  STANDING MEDICATIONS  azithromycin   Tablet 500 milliGRAM(s) Oral daily  cefpodoxime 200 milliGRAM(s) Oral every 12 hours  chlorhexidine 2% Cloths 1 Application(s) Topical <User Schedule>  dextromethorphan 20 mG/quinidine sulfate 10 mG 1 Capsule(s) Oral two times a day  enoxaparin Injectable 40 milliGRAM(s) SubCutaneous every 24 hours  gabapentin 300 milliGRAM(s) Oral at bedtime  loratadine 10 milliGRAM(s) Oral daily  pantoprazole    Tablet 40 milliGRAM(s) Oral before breakfast  predniSONE   Tablet 40 milliGRAM(s) Oral daily  riluzole 50 milliGRAM(s) Oral two times a day  tamsulosin 0.4 milliGRAM(s) Oral at bedtime  thiamine 100 milliGRAM(s) Oral daily    PRN MEDICATIONS  acetaminophen     Tablet .. 650 milliGRAM(s) Oral every 6 hours PRN  albuterol    90 MICROgram(s) HFA Inhaler 2 Puff(s) Inhalation every 4 hours PRN  albuterol/ipratropium for Nebulization 3 milliLiter(s) Nebulizer every 6 hours PRN  aluminum hydroxide/magnesium hydroxide/simethicone Suspension 30 milliLiter(s) Oral every 4 hours PRN  melatonin 3 milliGRAM(s) Oral at bedtime PRN  ondansetron Injectable 4 milliGRAM(s) IV Push every 8 hours PRN    VITALS:   T(F): 97.4  HR: 101  BP: 152/81  RR: 20  SpO2: 98%    PHYSICAL EXAM:  GENERAL:   (x  ) NAD, lying in bed comfortably     (  ) obtunded     (  ) lethargic     (  ) somnolent    HEAD:   ( x ) Atraumatic     (  ) hematoma     (  ) laceration (specify location:       )     NECK:  (x  ) Supple     (  ) neck stiffness     (  ) nuchal rigidity     (  )  no JVD     (  ) JVD present ( -- cm)    HEART:  Rate -->     (  ) normal rate     (  ) bradycardic     (x  ) tachycardic  Rhythm -->     ( x ) regular     (  ) regularly irregular     (  ) irregularly irregular  Murmurs -->     (x  ) normal s1s2     (  ) systolic murmur     (  ) diastolic murmur     (  ) continuous murmur      (  ) S3 present     (  ) S4 present    LUNGS:   ( x )Unlabored respirations     (  ) tachypnea  (  x) B/L air entry     (  ) decreased breath sounds in:  (location     )    (  ) no adventitious sound     (  ) crackles     (  ) wheezing      (  ) rhonchi      (specify location:       )  (  ) chest wall tenderness (specify location:       )    ABDOMEN:   (x  ) Soft     (  ) tense   |   ( x ) nondistended     (  ) distended   |   (  ) +BS     (  ) hypoactive bowel sounds     (  ) hyperactive bowel sounds  (  ) nontender     (  ) RUQ tenderness     (  ) RLQ tenderness     (  ) LLQ tenderness     (  ) epigastric tenderness     (  ) diffuse tenderness  (  ) Splenomegaly      (  ) Hepatomegaly      (  ) Jaundice     (  ) ecchymosis     EXTREMITIES:  ( x ) Normal     (  ) Rash     (  ) ecchymosis     (  ) varicose veins      (  ) pitting edema     (  ) non-pitting edema   (  ) ulceration     (  ) gangrene:     (location:     )    NERVOUS SYSTEM:    ( x ) A&Ox3     (  ) confused     (  ) lethargic  CN II-XII:     (  ) Intact     (  ) deficits found     (Specify:     )   Upper extremities:     (  ) no sensorimotor deficits     (  ) weakness     (  ) loss of proprioception/vibration     (  ) loss of touch/temperature (specify:    )  Lower extremities:     (  ) no sensorimotor deficits     (  ) weakness     (  ) loss of proprioception/vibration     (  ) loss of touch/temperature (specify:    )    LABS:                        13.8   7.60  )-----------( 295      ( 04 Apr 2024 07:42 )             40.2     04-04    140  |  97<L>  |  21<H>  ----------------------------<  111<H>  3.6   |  30  |  0.6<L>    Ca    10.4      04 Apr 2024 07:42    Urinalysis Basic - ( 04 Apr 2024 07:42 )    Color: x / Appearance: x / SG: x / pH: x  Gluc: 111 mg/dL / Ketone: x  / Bili: x / Urobili: x   Blood: x / Protein: x / Nitrite: x   Leuk Esterase: x / RBC: x / WBC x   Sq Epi: x / Non Sq Epi: x / Bacteria: x    Culture - Blood (collected 02 Apr 2024 06:39)  Source: .Blood None  Preliminary Report (03 Apr 2024 17:02):    No growth at 24 hours  
Patient is a 73y old  Male who presents with a chief complaint of RUL Pneumonia (03 Apr 2024 11:27)      Patient seen and examined at bedside.    ALLERGIES:  No Known Allergies    MEDICATIONS:  acetaminophen     Tablet .. 650 milliGRAM(s) Oral every 6 hours PRN  albuterol    90 MICROgram(s) HFA Inhaler 2 Puff(s) Inhalation every 4 hours PRN  albuterol/ipratropium for Nebulization 3 milliLiter(s) Nebulizer every 6 hours PRN  aluminum hydroxide/magnesium hydroxide/simethicone Suspension 30 milliLiter(s) Oral every 4 hours PRN  cefTRIAXone   IVPB 1000 milliGRAM(s) IV Intermittent every 24 hours  dextromethorphan 20 mG/quinidine sulfate 10 mG 1 Capsule(s) Oral two times a day  enoxaparin Injectable 40 milliGRAM(s) SubCutaneous every 24 hours  gabapentin 300 milliGRAM(s) Oral at bedtime  loratadine 10 milliGRAM(s) Oral daily  melatonin 3 milliGRAM(s) Oral at bedtime PRN  ondansetron Injectable 4 milliGRAM(s) IV Push every 8 hours PRN  pantoprazole    Tablet 40 milliGRAM(s) Oral before breakfast  predniSONE   Tablet 40 milliGRAM(s) Oral daily  riluzole 50 milliGRAM(s) Oral two times a day  tamsulosin 0.4 milliGRAM(s) Oral at bedtime  thiamine 100 milliGRAM(s) Oral daily    Vital Signs Last 24 Hrs  T(F): 96.8 (03 Apr 2024 15:54), Max: 97.4 (02 Apr 2024 23:52)  HR: 110 (03 Apr 2024 15:54) (96 - 110)  BP: 146/75 (03 Apr 2024 15:54) (137/85 - 164/82)  RR: 20 (03 Apr 2024 15:54) (18 - 20)  SpO2: 98% (03 Apr 2024 15:54) (95% - 98%)  I&O's Summary    02 Apr 2024 07:01  -  03 Apr 2024 07:00  --------------------------------------------------------  IN: 360 mL / OUT: 700 mL / NET: -340 mL    03 Apr 2024 07:01  -  03 Apr 2024 18:04  --------------------------------------------------------  IN: 210 mL / OUT: 650 mL / NET: -440 mL        PHYSICAL EXAM:  General: NAD, A/O x 3, cachexia   ENT: MMM  Neck: Supple, No JVD  Lungs: diminished bilaterally  Cardio: RRR, S1/S2, No murmurs, tachy  Abdomen: Soft, Nontender, Nondistended; Bowel sounds present  Extremities: No cyanosis, No edema    LABS:                        13.7   9.43  )-----------( 297      ( 03 Apr 2024 06:12 )             40.1     04-03    144  |  99  |  27  ----------------------------<  98  3.4   |  33  |  0.8    Ca    10.5      03 Apr 2024 06:12  Phos  5.3     04-01  Mg     1.8     04-02    TPro  6.5  /  Alb  4.0  /  TBili  0.3  /  DBili  x   /  AST  26  /  ALT  20  /  AlkPhos  77  04-02                TSH --   TSH with FT4 reflex 1.02  Total T3 92                  Urinalysis Basic - ( 03 Apr 2024 06:12 )    Color: x / Appearance: x / SG: x / pH: x  Gluc: 98 mg/dL / Ketone: x  / Bili: x / Urobili: x   Blood: x / Protein: x / Nitrite: x   Leuk Esterase: x / RBC: x / WBC x   Sq Epi: x / Non Sq Epi: x / Bacteria: x        Culture - Blood (collected 02 Apr 2024 06:39)  Source: .Blood None  Preliminary Report (03 Apr 2024 17:02):    No growth at 24 hours          RADIOLOGY & ADDITIONAL TESTS:  < from: CT Abdomen and Pelvis w/ Oral Cont and w/ IV Cont (04.03.24 @ 14:30) >    IMPRESSION:    Patchy right basilar pulmonary opacities likely infectious.    4mm left lower lobe pulmonary nodule. Recommend follow-up (this patient   has had prior lung cancer screening CT).    Other findings in the body of the report.    < end of copied text >  < from: Xray Chest 1 View- PORTABLE-Routine (Xray Chest 1 View- PORTABLE-Routine in AM.) (04.03.24 @ 04:38) >    Impression:    No radiographic evidence of acute cardiopulmonary disease.    Grossly unchanged.    < end of copied text >    Care Discussed with Consultants/Other Providers: 
Patient is a 73y old  Male who presents with a chief complaint of RUL Pneumonia (02 Apr 2024 16:46)        Over Night Events:    No events   On NC         ROS:  See HPI    PHYSICAL EXAM    ICU Vital Signs Last 24 Hrs  T(C): 36.3 (02 Apr 2024 23:52), Max: 36.6 (02 Apr 2024 15:00)  T(F): 97.4 (02 Apr 2024 23:52), Max: 97.9 (02 Apr 2024 15:00)  HR: 96 (02 Apr 2024 23:52) (96 - 119)  BP: 137/85 (02 Apr 2024 23:52) (137/85 - 167/92)  BP(mean): --  ABP: --  ABP(mean): --  RR: 18 (02 Apr 2024 23:52) (18 - 18)  SpO2: 99% (02 Apr 2024 09:53) (99% - 99%)    O2 Parameters below as of 02 Apr 2024 09:53  Patient On (Oxygen Delivery Method): nasal cannula  O2 Flow (L/min): 2          General: NAD   HEENT: RADHA             Lymphatic system: No cervical LN   Lungs: Bilateral BS,c lear   Cardiovascular: Regular   Gastrointestinal: Soft, Positive BS  Extremities: No clubbing.  Moves extremities.  Full Range of motion   Skin: Warm, intact  Neurological: No motor or sensory deficit       04-02-24 @ 07:01  -  04-03-24 @ 07:00  --------------------------------------------------------  IN:    Oral Fluid: 360 mL  Total IN: 360 mL    OUT:    Voided (mL): 700 mL  Total OUT: 700 mL    Total NET: -340 mL          LABS:                            13.0   9.88  )-----------( 277      ( 02 Apr 2024 06:39 )             38.5                                               04-02    146  |  102  |  25<H>  ----------------------------<  101<H>  3.5   |  31  |  0.7    Ca    9.8      02 Apr 2024 06:39  Mg     1.8     04-02    TPro  6.5  /  Alb  4.0  /  TBili  0.3  /  DBili  x   /  AST  26  /  ALT  20  /  AlkPhos  77  04-02                                             Urinalysis Basic - ( 02 Apr 2024 06:39 )    Color: x / Appearance: x / SG: x / pH: x  Gluc: 101 mg/dL / Ketone: x  / Bili: x / Urobili: x   Blood: x / Protein: x / Nitrite: x   Leuk Esterase: x / RBC: x / WBC x   Sq Epi: x / Non Sq Epi: x / Bacteria: x                                                  LIVER FUNCTIONS - ( 02 Apr 2024 06:39 )  Alb: 4.0 g/dL / Pro: 6.5 g/dL / ALK PHOS: 77 U/L / ALT: 20 U/L / AST: 26 U/L / GGT: x                                                                                                                                       MEDICATIONS  (STANDING):  azithromycin  IVPB 500 milliGRAM(s) IV Intermittent every 24 hours  azithromycin  IVPB      cefTRIAXone   IVPB 1000 milliGRAM(s) IV Intermittent every 24 hours  dextromethorphan 20 mG/quinidine sulfate 10 mG 1 Capsule(s) Oral two times a day  enoxaparin Injectable 40 milliGRAM(s) SubCutaneous every 24 hours  gabapentin 300 milliGRAM(s) Oral at bedtime  loratadine 10 milliGRAM(s) Oral daily  pantoprazole    Tablet 40 milliGRAM(s) Oral before breakfast  predniSONE   Tablet 40 milliGRAM(s) Oral daily  riluzole 50 milliGRAM(s) Oral two times a day  tamsulosin 0.4 milliGRAM(s) Oral at bedtime    MEDICATIONS  (PRN):  acetaminophen     Tablet .. 650 milliGRAM(s) Oral every 6 hours PRN Temp greater or equal to 38C (100.4F), Mild Pain (1 - 3)  albuterol    90 MICROgram(s) HFA Inhaler 2 Puff(s) Inhalation every 4 hours PRN Shortness of Breath and/or Wheezing  albuterol/ipratropium for Nebulization 3 milliLiter(s) Nebulizer every 6 hours PRN Shortness of Breath and/or Wheezing  aluminum hydroxide/magnesium hydroxide/simethicone Suspension 30 milliLiter(s) Oral every 4 hours PRN Dyspepsia  melatonin 3 milliGRAM(s) Oral at bedtime PRN Insomnia  ondansetron Injectable 4 milliGRAM(s) IV Push every 8 hours PRN Nausea and/or Vomiting

## 2024-04-04 NOTE — DISCHARGE NOTE PROVIDER - CARE PROVIDER_API CALL
Martínez Perez  Internal Medicine  [address] [place] [address]  Phone: [phone]  Fax: [phone]  Follow Up Time: 2 weeks    Nicanor Maldonado  Pulmonary Disease  [address] [place] [address], [facility] [address]  [place] [address]  Phone: [phone]  Fax: [phone]  Follow Up Time: 2 weeks    Mary Carmen Perez  Dietitian nutritionist  [address] [place] [id number]  Phone: [phone]  Fax: [phone]  Follow Up Time: 2 weeks

## 2024-04-04 NOTE — CHART NOTE - NSCHARTNOTEFT_GEN_A_CORE
Mr. Garcia, a 73 yr old male with chronic hypercapnic respiratory failure secondary to ALS (Amyotrophic Lateral Sclerosis) and COPD.     On this admission, he was retaining High CO2 levels of 51. He will require a non invasive ventilator due to his persistent hypercapnia. The NIV will provide adequate continuous ventilation to keep the patients hypercapnia under control. this NIV will provide longer expiratory time to reduce the effects of flow limitation and air trapping. This will decrease his work of breathing, decrease the amount of CO@ and increase oxygenation. This is the only machine that proves mouthpiece ventilation.   Without this NIV Mr. Garcia will decline clinically and will cause readmissions. He requires augmented volume NIV not found on standard BIPAP.     This patient will need this NIV joel a safe discharge.     Please expedite this authorisation.

## 2024-04-04 NOTE — DISCHARGE NOTE PROVIDER - NSDCCPCAREPLAN_GEN_ALL_CORE_FT
PRINCIPAL DISCHARGE DIAGNOSIS  Diagnosis: Acute respiratory distress  Assessment and Plan of Treatment: You came in with resp distress, you were admitted for COPD exacerbation 2/2 RLL pneumonia. You needed iv abx for 5 days, you will need to take 1 more day of vantin. You are a tapered dose of steroids, please finish it by taking 20mg prednisone for 2 mmore days and 10mg for another day.   You will need to be on Bipap while you are sleeping.   On your CT scan scan we found that you had 4mm lung nodule., please follow up with your PCP very closely for the same.   please follow up with nutritionist to assess your nutrition.      SECONDARY DISCHARGE DIAGNOSES  Diagnosis: Right upper lobe pneumonia  Assessment and Plan of Treatment:     Diagnosis: ALS (amyotrophic lateral sclerosis)  Assessment and Plan of Treatment:

## 2024-04-04 NOTE — DISCHARGE NOTE PROVIDER - NSDCMRMEDTOKEN_GEN_ALL_CORE_FT
Albuterol (Eqv-Proventil HFA) 90 mcg/inh inhalation aerosol: 2 inhaled  cefpodoxime 200 mg oral tablet: 1 tab(s) orally every 12 hours  Flomax 0.4 mg oral capsule: 1 cap(s) orally once a day (at bedtime)  gabapentin 300 mg oral capsule: 1 cap(s) orally once a day (at bedtime)  ipratropium 21 mcg/inh (0.03%) nasal spray: 2 intranasally  levocetirizine 5 mg oral tablet: 1 tab(s) orally once a day  naproxen 500 mg (as sodium) oral tablet, extended release: 1 tab(s) orally 2 times a day as needed for  moderate pain  NexIUM 20 mg oral delayed release capsule: 1 cap(s) orally once a day  Nuedexta 20 mg-10 mg oral capsule: 1 cap(s) orally 2 times a day  predniSONE 10 mg oral tablet: 1 tab(s) orally once a day Please take 2 tabs (20mg) for two days and then 1 tab (10mg) for one day.  Radicava  mg/5 mL oral suspension: 5 milliliter(s) orally once a day take for 10 days and off for 18 days - last taken 03/31/2024  riluzole 50 mg oral tablet: 1 orally 2 times a day

## 2024-04-11 PROBLEM — J43.2 CENTRILOBULAR EMPHYSEMA: Status: ACTIVE | Noted: 2023-01-01

## 2024-04-11 PROBLEM — F17.201 SEVERE TOBACCO DEPENDENCE IN EARLY REMISSION: Status: ACTIVE | Noted: 2023-01-01

## 2024-04-11 PROBLEM — G12.21 ALS (AMYOTROPHIC LATERAL SCLEROSIS): Status: ACTIVE | Noted: 2022-12-01

## 2024-04-11 PROBLEM — R09.89 SUSPECTED PULMONARY ASPIRATION OF FOOD: Status: ACTIVE | Noted: 2024-01-01

## 2024-04-11 NOTE — RESULTS/DATA
[TextEntry] : CT of the chest performed 4/1 images and radiologist read reviewed, by my read demonstrating mild centrilobular upper lobe predominant emphysema.  There are patchy nodular airspace opacities with very small area of consolidation in the distal dependent region of the right lower lobe.  Small amount of debris in the trachea, possibly consistent with aspiration.

## 2024-04-11 NOTE — HISTORY OF PRESENT ILLNESS
[Former] : former [>= 20 pack years] : >= 20 pack years [TextBox_4] : Mr. 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  breathing not a major concern other areas more troublesome activity not limited by breathing, sometimes breathes heavy, especially during PT 3x/wk Very very tired when coming home  NIF -60 in March 2023  # Apnea Screening 	(  ) Snoring while asleep? 	( x ) Tiredness during the day? 	(  ) Observed overnight apnea? 	(  ) Pressure elevated? 	(  ) BMI > 35? 	( x ) Age > 50? 	(  ) Neck circumference >16in? 	( x ) Gender = male?   Occupational Hx: Was  (, retired in 2020)/did [employer] work     Interval history Patient overall feels well, denies new complaints Recently hospitalized for shortness of breath and was diagnosed with pneumonia. Patient was evaluated by Dr Gabe Lizarraga who found that he would benefit from overnight BiPAP after discharge due to chronic hypercapnia and hypoventilation Unfortunately patient did not receive this after discharge. There was evidence of aspiration on CT chest with debris in the trachea, however only a bedside swallow was performed  [TextBox_11] : 1 [TextBox_13] : 60 [YearQuit] : 2022

## 2024-04-13 NOTE — ED ADULT NURSE NOTE - NSFALLUNIVINTERV_ED_ALL_ED
Bed/Stretcher in lowest position, wheels locked, appropriate side rails in place/Call bell, personal items and telephone in reach/Instruct patient to call for assistance before getting out of bed/chair/stretcher/Non-slip footwear applied when patient is off stretcher/[place] to call system/Physically safe environment - no spills, clutter or unnecessary equipment/Purposeful proactive rounding/Room/bathroom lighting operational, light cord in reach

## 2024-04-13 NOTE — H&P ADULT - ASSESSMENT
Patient w/ advanced dementia and ALS, not really verbal at baseline, wife at bedside providing hx.  Patient is a 72yo male w/ pmhx of ALS, dementia, lung nodule, GERD, recently discharged on 04/09th on BIPAP for AHRF 2/2 to PNA presenting with worsening SOB. Wife reports since being discharged has not been breathing so well, was supposed to get BIPAP delivered at home which they never did so he has not been using it. Went to saw his pulmonologist who recommended albuterol treatment and reordered the BIPAP. Reports gave him the albuterol treatment at home but no changes in breathing. This morning he just looked extremely fatigues, using accesory muscles to breath so called EMS. Upon EMS arrival patient was hypoxic to 60s, was given supplemental oxygen w/ improvement in respiratory status. Wife denies any fever, vomiting, complaints of pain, changes in BMs, or any urinary sxs.   Wife reports patient is DNR/DNI, no feeding tube. Also reports wants the BIPAP to be removed so patient can be more comfortable. Reports the patient would want to be comfortable and would not want to be like this. Wants to speak with palliative and hospice team as well. Explained to paitent that patient is retaining, condition can be worsened if off BIPAP, understands and still wants NC.     #Acute respiratory failure with hypercapnia  #Sepsis   #Leukocytosis- WBC 20  #Lactic Acidosis 4.3 > 1.5 - resolved   #?Suspected COPD  - Rectal temp 94.7, , 97% on BIPA  - s/p vanco and cefepime in ED  - c/w IV abx - cefepime   - Solumedrol 40mg BID  - Duoneb Q6 standing and prn for sob/wheezing   - FU procals, BCx, UCx, Sputum Cx, Legionella, strep, MRSA   - Incentive spirometer, heating blanket   - Wife refusing BIPAP, NC for now- wean off as tolerated. Palliative and Hospice FU   - Montelukast 10mg QD   - Aspiration precaution, S&S FU   - Trend WBC, monitor for fevers     #Hypernatremia likely 2ry to dehydration   - Na 105  - IVF   - Monitor BMP BID      #Elevated troponin likely in setting of demand ischemia   - Troponin 28-29  - ECG NSR     #Hx ALS  #Dementia   - C/w home meds   - Radicava ORS 10 days on 18 days off, restart on April 10   - Wife to bring home medication in for non formulatory     #4mm lung nodule on CT chest  - CT AP oral & IV contrast -  4mm left lower lobe pulmonary nodule.   - Outpatient FU w/ Pulm    #Hx GERD  - PPI    Diet: NPO, pending S&S  Activity: OOB, aspiration precaution  VTE PPX: Lovenox  GI PPX: PPI  Code Status: DNR/DNI, no feeding tube, Hospice/Palliative FU    Plan Discussed and approved by attending on call.       Patient w/ advanced dementia and ALS, not really verbal at baseline, wife at bedside providing hx.  Patient is a 74yo male w/ pmhx of ALS, dementia, lung nodule, GERD, recently discharged on 04/09th on BIPAP for AHRF 2/2 to PNA presenting with worsening SOB. Wife reports since being discharged has not been breathing so well, was supposed to get BIPAP delivered at home which they never did so he has not been using it. Went to saw his pulmonologist who recommended albuterol treatment and reordered the BIPAP. Reports gave him the albuterol treatment at home but no changes in breathing. This morning he just looked extremely fatigues, using accesory muscles to breath so called EMS. Upon EMS arrival patient was hypoxic to 60s, was given supplemental oxygen w/ improvement in respiratory status. Wife denies any fever, vomiting, complaints of pain, changes in BMs, or any urinary sxs.   Wife reports patient is DNR/DNI, no feeding tube. Also reports wants the BIPAP to be removed so patient can be more comfortable. Reports the patient would want to be comfortable and would not want to be like this. Wants to speak with palliative and hospice team as well. Explained to paitent that patient is retaining, condition can be worsened if off BIPAP, understands and still wants NC.     #Acute respiratory failure with hypercapnia  #Sepsis   #Leukocytosis- WBC 20  #Lactic Acidosis 4.3 > 1.5 - resolved   #?Suspected COPD  - Rectal temp 94.7, , 97% on BIPA  - s/p vanco and cefepime in ED  - c/w IV abx - cefepime   - Solumedrol 40mg BID  - Duoneb Q6 standing and prn for sob/wheezing   - FU procals, BCx, UCx, Sputum Cx, Legionella, strep, MRSA   - Incentive spirometer, heating blanket   - Wife refusing BIPAP, NC for now- wean off as tolerated as she feels  would not want it and wants him to pass comfortably  -  Palliative and Hospice FU   - Montelukast 10mg QD   - Aspiration precaution, S&S FU   - Trend WBC, monitor for fevers     #hypovolemic Hypernatremia  - Na 150  - started D5 1/2NS   - Monitor BMP BID      #Elevated troponin likely in setting of demand ischemia   - Troponin 28-29  - ECG NSR     #Hx ALS  #Dementia   - C/w home meds   - Radicava ORS 10 days on 18 days off, restart on April 10   - Wife to bring home medication in for non formulatory     #4mm lung nodule on CT chest  - CT AP oral & IV contrast -  4mm left lower lobe pulmonary nodule.   - Outpatient FU w/ Pulm    #Hx GERD  - PPI    Diet: NPO, pending S&S  Activity: OOB, aspiration precaution  VTE PPX: Lovenox  GI PPX: PPI  Code Status: DNR/DNI, no feeding tube, Hospice/Palliative FU    Plan Discussed and approved by attending on call.

## 2024-04-13 NOTE — ED PROVIDER NOTE - PROGRESS NOTE DETAILS
Instructed to continue 15 mg on Wed and 10 mg all other days.  Repeat INR in 1 month. patient verbalized understanding patient appears fluid overloaded with edema noted as well as crackles b/l with bradycpnea   iv fluids bolus at 30cc/kg is contraindicated at this time. patient has history of als. patient is also dni/dnr unable to intubate if patient becomes overloaded

## 2024-04-13 NOTE — ED PROVIDER NOTE - ATTENDING CONTRIBUTION TO CARE
I have personally performed a history and physical exam on this patient and personally directed the management of the patient. Patient is a 73-year-old male past medical history of ALS presents for evaluation of worsening shortness of breath the patient was recently discharged from admission for respiratory distress with pneumonia 7 days ago was improving however over the past 24 hours patient has become progressively short of breath again history is corroborated by the patient's wife as the patient is unreliable historian secondary to his history of ALS and dementia she notes no fevers no chills no vomiting no diarrhea patient does not seem to be in pain at this time wife noted progressive shortness of breath prompting EMS call prehospital EMS arrived on scene found patient hypoxic however improved with  supplemental oxygen    On physical exam patient is bradychpneic otherwise cachectic normocephalic atraumatic pupils equal round react light accommodation oropharynx clear but dry chest reveals evidence of mild bilateral crackles in all lung fields abdomen soft nontender nondistended radial pulse 2+ pedal pulse 2+ patient has slight pitting edema in the bilateral lower extremities    Assessment plan patient with history of ALS presents for worsening shortness of breath patient placed on BiPAP although on initial arrival improved with supplemental oxygen patient is actually saturating close to 100% on room air considering his VBG patient was started on BiPAP reinitiated empiric IV antibiotics after reviewing his prior records we obtain EKG per my independent evaluation not consistent with STEMI I will continue to monitor at this time

## 2024-04-13 NOTE — PATIENT PROFILE ADULT - FALL HARM RISK - HARM RISK INTERVENTIONS
Assistance with ambulation/Assistance OOB with selected safe patient handling equipment/Communicate Risk of Fall with Harm to all staff/Discuss with provider need for PT consult/Monitor gait and stability/Reinforce activity limits and safety measures with patient and family/Review medications for side effects contributing to fall risk/Sit up slowly, dangle for a short time, stand at bedside before walking/Tailored Fall Risk Interventions/Toileting schedule using arm’s reach rule for commode and bathroom/Visual Cue: Yellow wristband and red socks/Bed in lowest position, wheels locked, appropriate side rails in place/Call bell, personal items and telephone in reach/Instruct patient to call for assistance before getting out of bed or chair/Non-slip footwear when patient is out of bed/[place] to call system/Physically safe environment - no spills, clutter or unnecessary equipment/Purposeful Proactive Rounding/Room/bathroom lighting operational, light cord in reach

## 2024-04-13 NOTE — ED PROVIDER NOTE - PHYSICAL EXAMINATION
On physical exam patient is bradychpneic otherwise cachectic normocephalic atraumatic pupils equal round react light accommodation oropharynx clear but dry chest reveals evidence of mild bilateral crackles in all lung fields abdomen soft nontender nondistended radial pulse 2+ pedal pulse 2+ patient has slight pitting edema in the bilateral lower extremities

## 2024-04-13 NOTE — H&P ADULT - NS ATTEND AMEND GEN_ALL_CORE FT
Pt examined in the ED. Edits made directly in the plan above. Poor prognosis which wife is aware of. DNR/DNI

## 2024-04-13 NOTE — H&P ADULT - NSHPLABSRESULTS_GEN_ALL_CORE
15.6   20.43 )-----------( 416      ( 2024 09:16 )             46.6           150<H>  |  101  |  28<H>  ----------------------------<  158<H>  4.2   |  38<H>  |  0.7    Ca    10.4      2024 09:16    TPro  7.6  /  Alb  4.7  /  TBili  0.3  /  DBili  x   /  AST  39  /  ALT  38  /  AlkPhos  100                Urinalysis Basic - ( 2024 10:00 )    Color: Yellow / Appearance: Cloudy / S.019 / pH: x  Gluc: x / Ketone: Negative mg/dL  / Bili: Negative / Urobili: 1.0 mg/dL   Blood: x / Protein: 30 mg/dL / Nitrite: Negative   Leuk Esterase: Negative / RBC: 10 /HPF / WBC 2 /HPF   Sq Epi: x / Non Sq Epi: 8 /HPF / Bacteria: Moderate /HPF            Lactate Trend   @ 12:09 Lactate:1.3    @ 09:16 Lactate:3.6             CAPILLARY BLOOD GLUCOSE      POCT Blood Glucose.: 174 mg/dL (2024 09:05)    Culture Results:   No growth at 5 days ( @ 06:39)      < from: CT Head No Cont (24 @ 10:24) >  IMPRESSION:  No acute intracranial pathology.  Mild chronic microvascular ischemic changes.  --- End of Report ---    < from: Xray Chest 1 View- PORTABLE-Urgent (24 @ 09:40) >  Impression:  New bibasilar atelectatic changes.  --- End of Report ---

## 2024-04-13 NOTE — PATIENT PROFILE ADULT - NSPROPOAURINARYCATHETER_GEN_A_NUR
[FreeTextEntry1] : COPD with bronchiectasis\par Active tobacco use\par Continue to advised on tobacco cessation\par he remain on Spiriva Asmanex as ordered- OFF\par PRN Pro Air follow-up 3 months\par Prevnair 1/2021- states to get at [facility]\par PCV 23 Jan 2022\par Flu vaccine up to date 2021\par T DAP  2021\par \par 3-7 minute discussion with patient about smoking cessation was initiated with patient showing interest in attempting to quit smoking. Problems with risks of continued tobacco smoking including respiratory, cardiovascular, and oncological problems were discussed. Advice on smoking cessation was reiterated including methods of quitting, setting a date to quit, and different medicines and support systems available for smoking cessation was discussed. Addressed  options including nicotine patches gums lozenges, Wellbutrin, Chantix as well as smoking cessation program.\par f/u  3 months\par \par Follow-up PFT\par \par Oct 2022 low-dose CAT scan screening follow-up protocol\par \par Cardiology f/u\par Completed MODERNA + booster Yes no

## 2024-04-13 NOTE — ED PROVIDER NOTE - CARE PLAN
Principal Discharge DX:	Acute respiratory failure with hypercapnia  Secondary Diagnosis:	Sepsis   1 Principal Discharge DX:	Acute respiratory failure with hypercapnia  Secondary Diagnosis:	Sepsis  Secondary Diagnosis:	ALS (amyotrophic lateral sclerosis)

## 2024-04-13 NOTE — H&P ADULT - HISTORY OF PRESENT ILLNESS
Patient w/ advanced dementia and ALS, not really verbal at baseline, wife at bedside providing hx.  Patient is a 74yo male w/ pmhx of ALS, dementia, lung nodule, GERD, recently discharged on 04/09th on BIPAP for AHRF 2/2 to PNA presenting with worsening SOB. Wife reports since being discharged has not been breathing so well, was supposed to get BIPAP delivered at home which they never did so he has not been using it. Went to saw his pulmonologist who recommended albuterol treatment and reordered the BIPAP. Reports gave him the albuterol treatment at home but no changes in breathing. This morning he just looked extremely fatigues, using accesory muscles to breath so called EMS. Upon EMS arrival patient was hypoxic to 60s, was given supplemental oxygen w/ improvement in respiratory status. Wife denies any fever, vomiting, complaints of pain, changes in BMs, or any urinary sxs.   Wife reports patient is DNI/DNI, no feeding tube. Also reports wants the BIPAP to be removed so patient can be more comfortable. Reports the patient would want to be comfortable and would not want to be like this. Wants to speak with palliative and hospice team as well.

## 2024-04-13 NOTE — H&P ADULT - NSHPPHYSICALEXAM_GEN_ALL_CORE
Vital Signs Last 24 Hrs  T(C): 35.3 (13 Apr 2024 11:29), Max: 35.3 (13 Apr 2024 11:29)  T(F): 95.6 (13 Apr 2024 11:29), Max: 95.6 (13 Apr 2024 11:29)  HR: 104 (13 Apr 2024 13:00) (81 - 104)  BP: 112/72 (13 Apr 2024 13:00) (112/72 - 146/84)  RR: 20 (13 Apr 2024 13:00) (20 - 24)  SpO2: 96% (13 Apr 2024 13:00) (96% - 98%)    Parameters below as of 13 Apr 2024 13:00  Patient On (Oxygen Delivery Method): BiPAP/CPAP      GENERAL:  72y/o Male, on BIPAP, Cachectic appearance  HEAD:  Atraumatic, Normocephalic  EYES: EOMI, conjunctiva and sclera clear  CHEST/LUNG: Clear to auscultation bilaterally; No wheeze, rhonchi, or rales  HEART: tachypneic, S1&S2  ABDOMEN: Soft, Nontender, Nondistended x 4 quadrants; Bowel sounds present  EXTREMITIES:   Peripheral Pulses Present, No clubbing, no cyanosis, or no edema, no calf tenderness  PSYCH: AAOx - unable to asses

## 2024-04-13 NOTE — ED PROVIDER NOTE - CLINICAL SUMMARY MEDICAL DECISION MAKING FREE TEXT BOX
Assessment plan patient with history of ALS presents for worsening shortness of breath patient placed on BiPAP although on initial arrival improved with supplemental oxygen patient is actually saturating close to 100% on room air considering his VBG patient was started on BiPAP reinitiated empiric IV antibiotics after reviewing his prior records we obtain EKG per my independent evaluation not consistent with STEMI I will admit for further workup patient has dementia with als most likely acute worsening

## 2024-04-13 NOTE — H&P ADULT - NSICDXPASTMEDICALHX_GEN_ALL_CORE_FT
PAST MEDICAL HISTORY:  Acid reflux     ALS (amyotrophic lateral sclerosis)     Dementia     High cholesterol

## 2024-04-13 NOTE — H&P ADULT - NSHPSOCIALHISTORY_GEN_ALL_CORE
Patient former smoker, quite 2 years agio. Prior hx of smoking 1PPD x 40 years  No drinking or drug use  Resides at home w/ wife who is also the HCP

## 2024-04-13 NOTE — ED PROVIDER NOTE - OBJECTIVE STATEMENT
. Patient is a 73-year-old male past medical history of ALS presents for evaluation of worsening shortness of breath the patient was recently discharged from admission for respiratory distress with pneumonia 7 days ago was improving however over the past 24 hours patient has become progressively short of breath again history is corroborated by the patient's wife as the patient is unreliable historian secondary to his history of ALS and dementia she notes no fevers no chills no vomiting no diarrhea patient does not seem to be in pain at this time wife noted progressive shortness of breath prompting EMS call prehospital EMS arrived on scene found patient hypoxic however improved with  supplemental oxygen

## 2024-04-14 NOTE — CHART NOTE - NSCHARTNOTEFT_GEN_A_CORE
Palliative Care chart note    Palliative care consult received electronically. Chart reviewed. Family wants for patient to be CMO as per primary team. Would recommend 2mg IV morphine and 0.5mg of ativan both PRN Q1H, if the 2mg of morphine aren't effective can go up to 4mg or higher as needed. Will plan to see patient for full consult on Monday.    Please call [phone] PRN for any questions, needs, or concerns prior to that time.

## 2024-04-14 NOTE — SWALLOW BEDSIDE ASSESSMENT ADULT - SLP PERTINENT HISTORY OF CURRENT PROBLEM
74yo male w/ pmhx of ALS, dementia, lung nodule, GERD, recently discharged on 04/09th on BIPAP for AHRF 2/2 to PNA presenting with worsening SOB. Wife reports since being discharged has not been breathing so well, was supposed to get BIPAP delivered at home which they never did so he has not been using it. Went to saw his pulmonologist who recommended albuterol treatment and reordered the BIPAP. Reports gave him the albuterol treatment at home but no changes in breathing. This morning he just looked extremely fatigues, using accesory muscles to breath so called EMS. Upon EMS arrival patient was hypoxic to 60s, was given supplemental oxygen w/ improvement in respiratory status.

## 2024-04-14 NOTE — SWALLOW BEDSIDE ASSESSMENT ADULT - COMMENTS
"Wife reports patient is DNR/DNI, no feeding tube. Also reports wants the BIPAP to be removed so patient can be more comfortable. Reports the patient would want to be comfortable and would not want to be like this. Wants to speak with palliative and hospice team as well. Explained to nachot that patient is retaining, condition can be worsened if off BIPAP, understands and still wants NC."

## 2024-04-14 NOTE — DISCHARGE NOTE FOR THE EXPIRED PATIENT - NS PRELIMINARY CAUSE OF DEATH_RESTRICTED
Failure to Thrive/Respiratory Failure Cardiopulmonary Arrest/Chronic lung disease/Failure to Thrive/Respiratory Failure

## 2024-04-14 NOTE — PROGRESS NOTE ADULT - SUBJECTIVE AND OBJECTIVE BOX
Patient is a 73y old  Male who presents with a chief complaint of sob    SUBJECTIVE / OVERNIGHT EVENTS: Spoke to the wife extensively this am and she has decided to not put the bipap back on and would like comfort measures. He has not been eating and is nonverbal and he was fighting the bipap yesterday which is why he was on the NRB. I explained we can try to calm him and put the bipap on him as his current state is still reversible. However, his baseline had deteriorated significantly on the previous recent hospitalization and so his quality of life is quite poor. I explained we can arrange for an avaps as well for home, but she stated he never wanted to be uncomfortable on the NIVs. She understood what CMO entailed including removing O2 and medications. Palliative consulted and asked CM to consult hospice. Likely will contact the wife tomorrow.   ADDITIONAL REVIEW OF SYSTEMS: as above    MEDICATIONS  (STANDING):  albuterol/ipratropium for Nebulization 3 milliLiter(s) Nebulizer every 6 hours  pantoprazole  Injectable 40 milliGRAM(s) IV Push every 24 hours    MEDICATIONS  (PRN):  albuterol/ipratropium for Nebulization 3 milliLiter(s) Nebulizer every 6 hours PRN Shortness of Breath and/or Wheezing  LORazepam   Injectable 0.5 milliGRAM(s) IV Push every 1 hour PRN Anxiety  morphine  - Injectable 2 milliGRAM(s) IV Push every 2 hours PRN Moderate pain (4-6), Severe pain (7-10), Respiratory rate greater than 22  ondansetron Injectable 4 milliGRAM(s) IV Push every 8 hours PRN Nausea and/or Vomiting      CAPILLARY BLOOD GLUCOSE        I&O's Summary    13 Apr 2024 07:01  -  14 Apr 2024 07:00  --------------------------------------------------------  IN: 0 mL / OUT: 300 mL / NET: -300 mL        PHYSICAL EXAM:  Vital Signs Last 24 Hrs  T(C): 36.1 (14 Apr 2024 04:46), Max: 36.8 (13 Apr 2024 16:30)  T(F): 97 (14 Apr 2024 04:46), Max: 98.2 (13 Apr 2024 16:30)  HR: 74 (14 Apr 2024 04:46) (74 - 113)  BP: 147/86 (14 Apr 2024 04:46) (100/65 - 147/86)  BP(mean): --  RR: 19 (14 Apr 2024 04:46) (19 - 20)  SpO2: 97% (14 Apr 2024 07:46) (95% - 97%)    Parameters below as of 14 Apr 2024 04:46  Patient On (Oxygen Delivery Method): nasal cannula      CONSTITUTIONAL: NAD, thin  ENMT: on NRB and NC, eyes closed  NECK: Supple, no palpable masses; no thyromegaly  RESPIRATORY: normal resp effort   CARDIOVASCULAR: Regular rate and rhythm, normal S1 and S2, no murmur/rub/gallop; No lower extremity edema; Peripheral pulses are 2+ bilaterally  ABDOMEN: Nontender to palpation, normoactive bowel sounds, no rebound/guarding  MUSCULOSKELETAL:  no clubbing or cyanosis of digits; no joint swelling or tenderness to palpation  PSYCH: nonverbal  SKIN: No rashes; no palpable lesions    LABS:                        14.5   22.77 )-----------( 410      ( 14 Apr 2024 09:07 )             44.9     04-14    149<H>  |  103  |  27<H>  ----------------------------<  150<H>  4.3   |  40<H>  |  0.6<L>    Ca    10.0      14 Apr 2024 09:07    TPro  7.6  /  Alb  4.7  /  TBili  0.3  /  DBili  x   /  AST  39  /  ALT  38  /  AlkPhos  100  04-13          Urinalysis Basic - ( 14 Apr 2024 09:07 )    Color: x / Appearance: x / SG: x / pH: x  Gluc: 150 mg/dL / Ketone: x  / Bili: x / Urobili: x   Blood: x / Protein: x / Nitrite: x   Leuk Esterase: x / RBC: x / WBC x   Sq Epi: x / Non Sq Epi: x / Bacteria: x          RADIOLOGY & ADDITIONAL TESTS:  Results Reviewed:   Imaging Personally Reviewed:  Electrocardiogram Personally Reviewed:    COORDINATION OF CARE:  Care Discussed with Consultants/Other Providers [Y/N]:  Prior or Outpatient Records Reviewed [Y/N]:

## 2024-04-14 NOTE — DISCHARGE NOTE FOR THE EXPIRED PATIENT - HOSPITAL COURSE
Patient is a 73M w/ advanced dementia and ALS, minimally verbal at baseline, recently discharged on  for respiratory failure 2/2 pneumonia requiring bipap at home. Bipap was never delivered despite being ordered once again by outpatient pulmonology. Patient became increasingly lethargic and short of breath prompting return to ER and admission for respiratory failure. Goals of care discussed with wife who stated patient would not have wanted to suffer any longer with bipap and decision was made for patient to cease all treatment and be made comfort measures only. Patient was removed from supplemental oxygen and ultimately  at 14:39 on 2024 with wife and son present at bedside.

## 2024-04-14 NOTE — SWALLOW BEDSIDE ASSESSMENT ADULT - SWALLOW EVAL: PATIENT/FAMILY GOALS STATEMENT
DNR/DNI/no tube feeds. Family is interested in more comfort measures- requesting palliative and hospice consults.

## 2024-04-14 NOTE — PROGRESS NOTE ADULT - ASSESSMENT
74yo male w/ pmhx of ALS, dementia, lung nodule, GERD presenting with hypercarbic hypoxic respiratory failure.      #hypoxic hypercarbic respiratory failure in the setting of ALS  - previous hospitalization was for aspiration pna as noted by debris in trachea on CT scan, received ceftriaxone and azithromycin and then discharged on po vantin without a bipap  - pt returned hypoxic to the 60s and a CO2 >100 on initial abg  - not tolerating bipap and wife decided on CMO in addition to existing DNR/DNI  - palliative and hospice consults placed  - stopped medications, lab draws, vitals, IVF, and removed O2 which wife was aware would occur  - started morphine 2mg q 2 hrs prn pain instead of q1hr; can increase if pain noted  - started ativan 0,5mg q1hr prn anxiety  - zofran prn  - duoneb nebulizer  - no secretions so will hold off on scopolamine patch/glycopyrrolate  - wife counseled  - RN aware  - [facility]OLST update in chart by PA      Handoff: f/u hospice and palliative consult; adjust meds based on clinical status

## 2024-04-15 LAB
CULTURE RESULTS: SIGNIFICANT CHANGE UP
SPECIMEN SOURCE: SIGNIFICANT CHANGE UP

## 2024-04-18 DIAGNOSIS — E87.20 ACIDOSIS, UNSPECIFIED: ICD-10-CM

## 2024-04-18 DIAGNOSIS — G12.21 AMYOTROPHIC LATERAL SCLEROSIS: ICD-10-CM

## 2024-04-18 DIAGNOSIS — Z66 DO NOT RESUSCITATE: ICD-10-CM

## 2024-04-18 DIAGNOSIS — Z87.891 PERSONAL HISTORY OF NICOTINE DEPENDENCE: ICD-10-CM

## 2024-04-18 DIAGNOSIS — Z79.899 OTHER LONG TERM (CURRENT) DRUG THERAPY: ICD-10-CM

## 2024-04-18 DIAGNOSIS — E87.0 HYPEROSMOLALITY AND HYPERNATREMIA: ICD-10-CM

## 2024-04-18 DIAGNOSIS — Z78.1 PHYSICAL RESTRAINT STATUS: ICD-10-CM

## 2024-04-18 DIAGNOSIS — E78.00 PURE HYPERCHOLESTEROLEMIA, UNSPECIFIED: ICD-10-CM

## 2024-04-18 DIAGNOSIS — A41.9 SEPSIS, UNSPECIFIED ORGANISM: ICD-10-CM

## 2024-04-18 DIAGNOSIS — K21.9 GASTRO-ESOPHAGEAL REFLUX DISEASE WITHOUT ESOPHAGITIS: ICD-10-CM

## 2024-04-18 DIAGNOSIS — J96.02 ACUTE RESPIRATORY FAILURE WITH HYPERCAPNIA: ICD-10-CM

## 2024-04-18 DIAGNOSIS — Z51.5 ENCOUNTER FOR PALLIATIVE CARE: ICD-10-CM

## 2024-04-18 DIAGNOSIS — F03.90 UNSPECIFIED DEMENTIA WITHOUT BEHAVIORAL DISTURBANCE: ICD-10-CM

## 2024-04-18 DIAGNOSIS — E86.1 HYPOVOLEMIA: ICD-10-CM

## 2024-04-18 DIAGNOSIS — J96.01 ACUTE RESPIRATORY FAILURE WITH HYPOXIA: ICD-10-CM

## 2024-04-18 DIAGNOSIS — R64 CACHEXIA: ICD-10-CM

## 2024-04-18 DIAGNOSIS — Z11.52 ENCOUNTER FOR SCREENING FOR COVID-19: ICD-10-CM

## 2024-04-18 DIAGNOSIS — R91.1 SOLITARY PULMONARY NODULE: ICD-10-CM

## 2024-04-18 DIAGNOSIS — J18.9 PNEUMONIA, UNSPECIFIED ORGANISM: ICD-10-CM

## 2024-04-18 LAB
CULTURE RESULTS: SIGNIFICANT CHANGE UP
CULTURE RESULTS: SIGNIFICANT CHANGE UP
SPECIMEN SOURCE: SIGNIFICANT CHANGE UP
SPECIMEN SOURCE: SIGNIFICANT CHANGE UP

## 2024-06-07 ENCOUNTER — APPOINTMENT (OUTPATIENT)
Dept: NEUROLOGY | Facility: CLINIC | Age: 74
End: 2024-06-07

## 2024-06-27 ENCOUNTER — APPOINTMENT (OUTPATIENT)
Dept: PULMONOLOGY | Facility: CLINIC | Age: 74
End: 2024-06-27

## 2024-08-08 ENCOUNTER — APPOINTMENT (OUTPATIENT)
Dept: OTOLARYNGOLOGY | Facility: CLINIC | Age: 74
End: 2024-08-08